# Patient Record
Sex: MALE | Race: WHITE | NOT HISPANIC OR LATINO | URBAN - METROPOLITAN AREA
[De-identification: names, ages, dates, MRNs, and addresses within clinical notes are randomized per-mention and may not be internally consistent; named-entity substitution may affect disease eponyms.]

---

## 2019-02-25 ENCOUNTER — INPATIENT (INPATIENT)
Facility: HOSPITAL | Age: 59
LOS: 7 days | Discharge: HOME | DRG: 871 | End: 2019-03-05
Attending: INTERNAL MEDICINE | Admitting: INTERNAL MEDICINE
Payer: MEDICARE

## 2019-02-25 VITALS
WEIGHT: 231.04 LBS | HEART RATE: 98 BPM | OXYGEN SATURATION: 94 % | DIASTOLIC BLOOD PRESSURE: 62 MMHG | RESPIRATION RATE: 16 BRPM | TEMPERATURE: 98 F | SYSTOLIC BLOOD PRESSURE: 106 MMHG | HEIGHT: 70 IN

## 2019-02-25 DIAGNOSIS — A41.9 SEPSIS, UNSPECIFIED ORGANISM: ICD-10-CM

## 2019-02-25 PROBLEM — Z00.00 ENCOUNTER FOR PREVENTIVE HEALTH EXAMINATION: Status: ACTIVE | Noted: 2019-02-25

## 2019-02-25 LAB
ALBUMIN SERPL ELPH-MCNC: 3.3 G/DL — SIGNIFICANT CHANGE UP (ref 3.3–5)
ALP SERPL-CCNC: 96 U/L — SIGNIFICANT CHANGE UP (ref 40–120)
ALT FLD-CCNC: 27 U/L — SIGNIFICANT CHANGE UP (ref 12–78)
ANION GAP SERPL CALC-SCNC: 9 MMOL/L — SIGNIFICANT CHANGE UP (ref 5–17)
AST SERPL-CCNC: 15 U/L — SIGNIFICANT CHANGE UP (ref 15–37)
BASOPHILS # BLD AUTO: 0 K/UL — SIGNIFICANT CHANGE UP (ref 0–0.2)
BASOPHILS NFR BLD AUTO: 0 % — SIGNIFICANT CHANGE UP (ref 0–2)
BILIRUB SERPL-MCNC: 2.1 MG/DL — HIGH (ref 0.2–1.2)
BUN SERPL-MCNC: 19 MG/DL — SIGNIFICANT CHANGE UP (ref 7–23)
CALCIUM SERPL-MCNC: 8.3 MG/DL — LOW (ref 8.5–10.1)
CHLORIDE SERPL-SCNC: 109 MMOL/L — HIGH (ref 96–108)
CO2 SERPL-SCNC: 24 MMOL/L — SIGNIFICANT CHANGE UP (ref 22–31)
CREAT SERPL-MCNC: 1.6 MG/DL — HIGH (ref 0.5–1.3)
EOSINOPHIL # BLD AUTO: 0 K/UL — SIGNIFICANT CHANGE UP (ref 0–0.5)
EOSINOPHIL NFR BLD AUTO: 0 % — SIGNIFICANT CHANGE UP (ref 0–6)
FLU A RESULT: SIGNIFICANT CHANGE UP
FLU A RESULT: SIGNIFICANT CHANGE UP
FLUAV AG NPH QL: SIGNIFICANT CHANGE UP
FLUBV AG NPH QL: SIGNIFICANT CHANGE UP
GLUCOSE SERPL-MCNC: 122 MG/DL — HIGH (ref 70–99)
HCT VFR BLD CALC: 36.8 % — LOW (ref 39–50)
HGB BLD-MCNC: 12.8 G/DL — LOW (ref 13–17)
LACTATE SERPL-SCNC: 1.1 MMOL/L — SIGNIFICANT CHANGE UP (ref 0.7–2)
LIDOCAIN IGE QN: 45 U/L — LOW (ref 73–393)
LYMPHOCYTES # BLD AUTO: 16 % — SIGNIFICANT CHANGE UP (ref 13–44)
LYMPHOCYTES # BLD AUTO: 2 K/UL — SIGNIFICANT CHANGE UP (ref 1–3.3)
MCHC RBC-ENTMCNC: 30.1 PG — SIGNIFICANT CHANGE UP (ref 27–34)
MCHC RBC-ENTMCNC: 34.8 GM/DL — SIGNIFICANT CHANGE UP (ref 32–36)
MCV RBC AUTO: 86.6 FL — SIGNIFICANT CHANGE UP (ref 80–100)
MONOCYTES # BLD AUTO: 1.88 K/UL — HIGH (ref 0–0.9)
MONOCYTES NFR BLD AUTO: 15 % — HIGH (ref 2–14)
NEUTROPHILS # BLD AUTO: 8.65 K/UL — HIGH (ref 1.8–7.4)
NEUTROPHILS NFR BLD AUTO: 67 % — SIGNIFICANT CHANGE UP (ref 43–77)
NRBC # BLD: SIGNIFICANT CHANGE UP /100 WBCS (ref 0–0)
PLATELET # BLD AUTO: 151 K/UL — SIGNIFICANT CHANGE UP (ref 150–400)
POTASSIUM SERPL-MCNC: 3.4 MMOL/L — LOW (ref 3.5–5.3)
POTASSIUM SERPL-SCNC: 3.4 MMOL/L — LOW (ref 3.5–5.3)
PROCALCITONIN SERPL-MCNC: 0.25 NG/ML — HIGH (ref 0–0.04)
PROT SERPL-MCNC: 7.3 G/DL — SIGNIFICANT CHANGE UP (ref 6–8.3)
RBC # BLD: 4.25 M/UL — SIGNIFICANT CHANGE UP (ref 4.2–5.8)
RBC # FLD: 13.1 % — SIGNIFICANT CHANGE UP (ref 10.3–14.5)
RSV RESULT: SIGNIFICANT CHANGE UP
RSV RNA RESP QL NAA+PROBE: SIGNIFICANT CHANGE UP
SODIUM SERPL-SCNC: 142 MMOL/L — SIGNIFICANT CHANGE UP (ref 135–145)
WBC # BLD: 12.53 K/UL — HIGH (ref 3.8–10.5)
WBC # FLD AUTO: 12.53 K/UL — HIGH (ref 3.8–10.5)

## 2019-02-25 PROCEDURE — 93010 ELECTROCARDIOGRAM REPORT: CPT

## 2019-02-25 PROCEDURE — 99285 EMERGENCY DEPT VISIT HI MDM: CPT

## 2019-02-25 PROCEDURE — 71046 X-RAY EXAM CHEST 2 VIEWS: CPT | Mod: 26

## 2019-02-25 RX ORDER — SODIUM CHLORIDE 9 MG/ML
1000 INJECTION INTRAMUSCULAR; INTRAVENOUS; SUBCUTANEOUS
Qty: 0 | Refills: 0 | Status: DISCONTINUED | OUTPATIENT
Start: 2019-02-25 | End: 2019-02-27

## 2019-02-25 RX ORDER — CLONAZEPAM 1 MG
1 TABLET ORAL THREE TIMES A DAY
Qty: 0 | Refills: 0 | Status: DISCONTINUED | OUTPATIENT
Start: 2019-02-25 | End: 2019-02-27

## 2019-02-25 RX ORDER — POTASSIUM CHLORIDE 20 MEQ
40 PACKET (EA) ORAL ONCE
Qty: 0 | Refills: 0 | Status: COMPLETED | OUTPATIENT
Start: 2019-02-25 | End: 2019-02-25

## 2019-02-25 RX ORDER — SODIUM CHLORIDE 9 MG/ML
3 INJECTION INTRAMUSCULAR; INTRAVENOUS; SUBCUTANEOUS ONCE
Qty: 0 | Refills: 0 | Status: COMPLETED | OUTPATIENT
Start: 2019-02-25 | End: 2019-02-25

## 2019-02-25 RX ORDER — ACETAMINOPHEN 500 MG
650 TABLET ORAL EVERY 6 HOURS
Qty: 0 | Refills: 0 | Status: DISCONTINUED | OUTPATIENT
Start: 2019-02-25 | End: 2019-03-05

## 2019-02-25 RX ORDER — ATORVASTATIN CALCIUM 80 MG/1
20 TABLET, FILM COATED ORAL AT BEDTIME
Qty: 0 | Refills: 0 | Status: DISCONTINUED | OUTPATIENT
Start: 2019-02-25 | End: 2019-03-05

## 2019-02-25 RX ORDER — PIPERACILLIN AND TAZOBACTAM 4; .5 G/20ML; G/20ML
3.38 INJECTION, POWDER, LYOPHILIZED, FOR SOLUTION INTRAVENOUS EVERY 8 HOURS
Qty: 0 | Refills: 0 | Status: DISCONTINUED | OUTPATIENT
Start: 2019-02-26 | End: 2019-03-05

## 2019-02-25 RX ORDER — ENOXAPARIN SODIUM 100 MG/ML
40 INJECTION SUBCUTANEOUS DAILY
Qty: 0 | Refills: 0 | Status: DISCONTINUED | OUTPATIENT
Start: 2019-02-25 | End: 2019-03-05

## 2019-02-25 RX ORDER — SODIUM CHLORIDE 9 MG/ML
1000 INJECTION INTRAMUSCULAR; INTRAVENOUS; SUBCUTANEOUS ONCE
Qty: 0 | Refills: 0 | Status: COMPLETED | OUTPATIENT
Start: 2019-02-25 | End: 2019-02-25

## 2019-02-25 RX ORDER — CLOZAPINE 150 MG/1
100 TABLET, ORALLY DISINTEGRATING ORAL THREE TIMES A DAY
Qty: 0 | Refills: 0 | Status: DISCONTINUED | OUTPATIENT
Start: 2019-02-25 | End: 2019-03-05

## 2019-02-25 RX ORDER — PIPERACILLIN AND TAZOBACTAM 4; .5 G/20ML; G/20ML
3.38 INJECTION, POWDER, LYOPHILIZED, FOR SOLUTION INTRAVENOUS ONCE
Qty: 0 | Refills: 0 | Status: COMPLETED | OUTPATIENT
Start: 2019-02-25 | End: 2019-02-25

## 2019-02-25 RX ADMIN — SODIUM CHLORIDE 1000 MILLILITER(S): 9 INJECTION INTRAMUSCULAR; INTRAVENOUS; SUBCUTANEOUS at 19:15

## 2019-02-25 RX ADMIN — SODIUM CHLORIDE 3 MILLILITER(S): 9 INJECTION INTRAMUSCULAR; INTRAVENOUS; SUBCUTANEOUS at 19:09

## 2019-02-25 RX ADMIN — Medication 40 MILLIEQUIVALENT(S): at 23:41

## 2019-02-25 RX ADMIN — ATORVASTATIN CALCIUM 20 MILLIGRAM(S): 80 TABLET, FILM COATED ORAL at 23:41

## 2019-02-25 RX ADMIN — SODIUM CHLORIDE 1000 MILLILITER(S): 9 INJECTION INTRAMUSCULAR; INTRAVENOUS; SUBCUTANEOUS at 21:15

## 2019-02-25 RX ADMIN — CLOZAPINE 100 MILLIGRAM(S): 150 TABLET, ORALLY DISINTEGRATING ORAL at 23:41

## 2019-02-25 RX ADMIN — PIPERACILLIN AND TAZOBACTAM 200 GRAM(S): 4; .5 INJECTION, POWDER, LYOPHILIZED, FOR SOLUTION INTRAVENOUS at 22:01

## 2019-02-25 NOTE — ED ADULT NURSE NOTE - NSIMPLEMENTINTERV_GEN_ALL_ED
Implemented All Fall Risk Interventions:  [place] to call system. Call bell, personal items and telephone within reach. Instruct patient to call for assistance. Room bathroom lighting operational. Non-slip footwear when patient is off stretcher. Physically safe environment: no spills, clutter or unnecessary equipment. Stretcher in lowest position, wheels locked, appropriate side rails in place. Provide visual cue, wrist band, yellow gown, etc. Monitor gait and stability. Monitor for mental status changes and reorient to person, place, and time. Review medications for side effects contributing to fall risk. Reinforce activity limits and safety measures with patient and family.

## 2019-02-25 NOTE — ED PROVIDER NOTE - CARE PLAN
Principal Discharge DX:	Sepsis  Assessment and plan of treatment:	admit  Secondary Diagnosis:	Pneumonia  Secondary Diagnosis:	Schizophrenia Principal Discharge DX:	Sepsis  Assessment and plan of treatment:	admit  Secondary Diagnosis:	Pneumonia  Secondary Diagnosis:	Schizophrenia  Secondary Diagnosis:	Leukocytosis  Secondary Diagnosis:	Bandemia  Secondary Diagnosis:	Renal insufficiency

## 2019-02-25 NOTE — ED ADULT NURSE NOTE - PMH
Drug abuse, in remission  sober x 20 yrs  H/O ETOH abuse  sober x 20 yrs  H/O pleural effusion  DX IN 2011 when pt had pneumonia  chronic condition now  HLD (hyperlipidemia)    Migraines    Obesity    Pneumonia  2011  Schizophrenia  pt never stated he had schizophrenia during interview but is on medication for it.  Smoking hx

## 2019-02-25 NOTE — ED PROVIDER NOTE - CLINICAL SUMMARY MEDICAL DECISION MAKING FREE TEXT BOX
Pt is a 60 yo male who presents to the ED with a cc of flu-like illness.  PMHx of h/o prior drug abuse currently in remission, h/o ETOH abuse currently in remission, h/o pleural effusion, HLD, migraine, obesity, pneumonia, Schizophrenia.  Pt with reported fevers, chills, cough, and SOB.  Concern for influenza vs pneumonia.  Will obtain screening septic work up, EKG, chest x-ray.  Will begin abx and monitor

## 2019-02-25 NOTE — ED PROVIDER NOTE - OBJECTIVE STATEMENT
Pt is a 58 yo male who presents to the ED with a cc of flu-like illness.  PMHx of h/o prior drug abuse currently in remission, h/o ETOH abuse currently in remission, h/o pleural effusion, HLD, migraine, obesity, pneumonia, Schizophrenia.  Pt reports that for the last several days he has not been feeling well.  He reports fevers T max of 103.5 with associated chills, SOB, and cough productive of yellow sputum.  Pt reports that symptoms have been worsening and so he followed up with his PMD today and was told to come to the ED for concern for possible pneumonia.  Pt denies N/V/D/C, CP, abd pain, ext numbness or weakness.  He reports that he did receive an influenza vaccine this year

## 2019-02-26 DIAGNOSIS — J18.9 PNEUMONIA, UNSPECIFIED ORGANISM: ICD-10-CM

## 2019-02-26 DIAGNOSIS — R10.9 UNSPECIFIED ABDOMINAL PAIN: ICD-10-CM

## 2019-02-26 DIAGNOSIS — Z29.9 ENCOUNTER FOR PROPHYLACTIC MEASURES, UNSPECIFIED: ICD-10-CM

## 2019-02-26 DIAGNOSIS — N28.9 DISORDER OF KIDNEY AND URETER, UNSPECIFIED: ICD-10-CM

## 2019-02-26 DIAGNOSIS — F20.9 SCHIZOPHRENIA, UNSPECIFIED: ICD-10-CM

## 2019-02-26 LAB
ALBUMIN SERPL ELPH-MCNC: 2.9 G/DL — LOW (ref 3.3–5)
ALP SERPL-CCNC: 83 U/L — SIGNIFICANT CHANGE UP (ref 40–120)
ALT FLD-CCNC: 20 U/L — SIGNIFICANT CHANGE UP (ref 12–78)
ANION GAP SERPL CALC-SCNC: 8 MMOL/L — SIGNIFICANT CHANGE UP (ref 5–17)
AST SERPL-CCNC: 17 U/L — SIGNIFICANT CHANGE UP (ref 15–37)
BASOPHILS # BLD AUTO: 0.02 K/UL — SIGNIFICANT CHANGE UP (ref 0–0.2)
BASOPHILS NFR BLD AUTO: 0.2 % — SIGNIFICANT CHANGE UP (ref 0–2)
BILIRUB DIRECT SERPL-MCNC: 0.8 MG/DL — HIGH (ref 0.05–0.2)
BILIRUB INDIRECT FLD-MCNC: 1.4 MG/DL — HIGH (ref 0.2–1)
BILIRUB SERPL-MCNC: 2.2 MG/DL — HIGH (ref 0.2–1.2)
BUN SERPL-MCNC: 17 MG/DL — SIGNIFICANT CHANGE UP (ref 7–23)
CALCIUM SERPL-MCNC: 7.6 MG/DL — LOW (ref 8.5–10.1)
CHLORIDE SERPL-SCNC: 110 MMOL/L — HIGH (ref 96–108)
CO2 SERPL-SCNC: 24 MMOL/L — SIGNIFICANT CHANGE UP (ref 22–31)
CREAT SERPL-MCNC: 1.6 MG/DL — HIGH (ref 0.5–1.3)
EOSINOPHIL # BLD AUTO: 0.01 K/UL — SIGNIFICANT CHANGE UP (ref 0–0.5)
EOSINOPHIL NFR BLD AUTO: 0.1 % — SIGNIFICANT CHANGE UP (ref 0–6)
GLUCOSE SERPL-MCNC: 133 MG/DL — HIGH (ref 70–99)
HCT VFR BLD CALC: 32.8 % — LOW (ref 39–50)
HGB BLD-MCNC: 11.4 G/DL — LOW (ref 13–17)
IMM GRANULOCYTES NFR BLD AUTO: 0.7 % — SIGNIFICANT CHANGE UP (ref 0–1.5)
LYMPHOCYTES # BLD AUTO: 0.88 K/UL — LOW (ref 1–3.3)
LYMPHOCYTES # BLD AUTO: 8.8 % — LOW (ref 13–44)
MAGNESIUM SERPL-MCNC: 1.9 MG/DL — SIGNIFICANT CHANGE UP (ref 1.6–2.6)
MCHC RBC-ENTMCNC: 30 PG — SIGNIFICANT CHANGE UP (ref 27–34)
MCHC RBC-ENTMCNC: 34.8 GM/DL — SIGNIFICANT CHANGE UP (ref 32–36)
MCV RBC AUTO: 86.3 FL — SIGNIFICANT CHANGE UP (ref 80–100)
MONOCYTES # BLD AUTO: 1.31 K/UL — HIGH (ref 0–0.9)
MONOCYTES NFR BLD AUTO: 13.1 % — SIGNIFICANT CHANGE UP (ref 2–14)
NEUTROPHILS # BLD AUTO: 7.72 K/UL — HIGH (ref 1.8–7.4)
NEUTROPHILS NFR BLD AUTO: 77.1 % — HIGH (ref 43–77)
NRBC # BLD: 0 /100 WBCS — SIGNIFICANT CHANGE UP (ref 0–0)
PLATELET # BLD AUTO: 141 K/UL — LOW (ref 150–400)
POTASSIUM SERPL-MCNC: 3.5 MMOL/L — SIGNIFICANT CHANGE UP (ref 3.5–5.3)
POTASSIUM SERPL-SCNC: 3.5 MMOL/L — SIGNIFICANT CHANGE UP (ref 3.5–5.3)
PROT SERPL-MCNC: 6.3 G/DL — SIGNIFICANT CHANGE UP (ref 6–8.3)
RBC # BLD: 3.8 M/UL — LOW (ref 4.2–5.8)
RBC # FLD: 13.2 % — SIGNIFICANT CHANGE UP (ref 10.3–14.5)
SODIUM SERPL-SCNC: 142 MMOL/L — SIGNIFICANT CHANGE UP (ref 135–145)
WBC # BLD: 10.01 K/UL — SIGNIFICANT CHANGE UP (ref 3.8–10.5)
WBC # FLD AUTO: 10.01 K/UL — SIGNIFICANT CHANGE UP (ref 3.8–10.5)

## 2019-02-26 PROCEDURE — 74176 CT ABD & PELVIS W/O CONTRAST: CPT | Mod: 26

## 2019-02-26 PROCEDURE — 71250 CT THORAX DX C-: CPT | Mod: 26

## 2019-02-26 RX ORDER — LANOLIN ALCOHOL/MO/W.PET/CERES
3 CREAM (GRAM) TOPICAL AT BEDTIME
Qty: 0 | Refills: 0 | Status: DISCONTINUED | OUTPATIENT
Start: 2019-02-26 | End: 2019-03-05

## 2019-02-26 RX ORDER — IOHEXOL 300 MG/ML
500 INJECTION, SOLUTION INTRAVENOUS
Qty: 0 | Refills: 0 | Status: COMPLETED | OUTPATIENT
Start: 2019-02-26 | End: 2019-02-26

## 2019-02-26 RX ORDER — DOCUSATE SODIUM 100 MG
100 CAPSULE ORAL THREE TIMES A DAY
Qty: 0 | Refills: 0 | Status: DISCONTINUED | OUTPATIENT
Start: 2019-02-26 | End: 2019-03-05

## 2019-02-26 RX ORDER — POLYETHYLENE GLYCOL 3350 17 G/17G
17 POWDER, FOR SOLUTION ORAL DAILY
Qty: 0 | Refills: 0 | Status: DISCONTINUED | OUTPATIENT
Start: 2019-02-26 | End: 2019-03-05

## 2019-02-26 RX ORDER — SIMETHICONE 80 MG/1
80 TABLET, CHEWABLE ORAL ONCE
Qty: 0 | Refills: 0 | Status: COMPLETED | OUTPATIENT
Start: 2019-02-26 | End: 2019-02-26

## 2019-02-26 RX ORDER — PANTOPRAZOLE SODIUM 20 MG/1
40 TABLET, DELAYED RELEASE ORAL
Qty: 0 | Refills: 0 | Status: DISCONTINUED | OUTPATIENT
Start: 2019-02-26 | End: 2019-03-05

## 2019-02-26 RX ORDER — TRAMADOL HYDROCHLORIDE 50 MG/1
25 TABLET ORAL EVERY 6 HOURS
Qty: 0 | Refills: 0 | Status: DISCONTINUED | OUTPATIENT
Start: 2019-02-26 | End: 2019-03-05

## 2019-02-26 RX ORDER — INFLUENZA VIRUS VACCINE 15; 15; 15; 15 UG/.5ML; UG/.5ML; UG/.5ML; UG/.5ML
0.5 SUSPENSION INTRAMUSCULAR ONCE
Qty: 0 | Refills: 0 | Status: DISCONTINUED | OUTPATIENT
Start: 2019-02-26 | End: 2019-03-05

## 2019-02-26 RX ORDER — KETOROLAC TROMETHAMINE 30 MG/ML
30 SYRINGE (ML) INJECTION ONCE
Qty: 0 | Refills: 0 | Status: DISCONTINUED | OUTPATIENT
Start: 2019-02-26 | End: 2019-02-26

## 2019-02-26 RX ORDER — TRAMADOL HYDROCHLORIDE 50 MG/1
50 TABLET ORAL EVERY 6 HOURS
Qty: 0 | Refills: 0 | Status: DISCONTINUED | OUTPATIENT
Start: 2019-02-26 | End: 2019-03-05

## 2019-02-26 RX ORDER — SENNA PLUS 8.6 MG/1
2 TABLET ORAL AT BEDTIME
Qty: 0 | Refills: 0 | Status: DISCONTINUED | OUTPATIENT
Start: 2019-02-26 | End: 2019-03-05

## 2019-02-26 RX ADMIN — CLOZAPINE 100 MILLIGRAM(S): 150 TABLET, ORALLY DISINTEGRATING ORAL at 13:15

## 2019-02-26 RX ADMIN — Medication 3 MILLIGRAM(S): at 21:19

## 2019-02-26 RX ADMIN — PIPERACILLIN AND TAZOBACTAM 25 GRAM(S): 4; .5 INJECTION, POWDER, LYOPHILIZED, FOR SOLUTION INTRAVENOUS at 21:17

## 2019-02-26 RX ADMIN — Medication 30 MILLILITER(S): at 19:59

## 2019-02-26 RX ADMIN — Medication 30 MILLIGRAM(S): at 19:59

## 2019-02-26 RX ADMIN — ATORVASTATIN CALCIUM 20 MILLIGRAM(S): 80 TABLET, FILM COATED ORAL at 21:19

## 2019-02-26 RX ADMIN — PANTOPRAZOLE SODIUM 40 MILLIGRAM(S): 20 TABLET, DELAYED RELEASE ORAL at 16:58

## 2019-02-26 RX ADMIN — CLOZAPINE 100 MILLIGRAM(S): 150 TABLET, ORALLY DISINTEGRATING ORAL at 06:10

## 2019-02-26 RX ADMIN — SODIUM CHLORIDE 100 MILLILITER(S): 9 INJECTION INTRAMUSCULAR; INTRAVENOUS; SUBCUTANEOUS at 04:55

## 2019-02-26 RX ADMIN — CLOZAPINE 100 MILLIGRAM(S): 150 TABLET, ORALLY DISINTEGRATING ORAL at 21:17

## 2019-02-26 RX ADMIN — Medication 650 MILLIGRAM(S): at 06:08

## 2019-02-26 RX ADMIN — PIPERACILLIN AND TAZOBACTAM 25 GRAM(S): 4; .5 INJECTION, POWDER, LYOPHILIZED, FOR SOLUTION INTRAVENOUS at 06:10

## 2019-02-26 RX ADMIN — ENOXAPARIN SODIUM 40 MILLIGRAM(S): 100 INJECTION SUBCUTANEOUS at 12:02

## 2019-02-26 RX ADMIN — Medication 30 MILLIGRAM(S): at 12:02

## 2019-02-26 RX ADMIN — Medication 1 MILLIGRAM(S): at 06:09

## 2019-02-26 RX ADMIN — Medication 30 MILLIGRAM(S): at 21:21

## 2019-02-26 RX ADMIN — Medication 100 MILLIGRAM(S): at 06:11

## 2019-02-26 RX ADMIN — IOHEXOL 500 MILLILITER(S): 300 INJECTION, SOLUTION INTRAVENOUS at 13:15

## 2019-02-26 RX ADMIN — Medication 650 MILLIGRAM(S): at 16:58

## 2019-02-26 RX ADMIN — SIMETHICONE 80 MILLIGRAM(S): 80 TABLET, CHEWABLE ORAL at 19:59

## 2019-02-26 RX ADMIN — PIPERACILLIN AND TAZOBACTAM 25 GRAM(S): 4; .5 INJECTION, POWDER, LYOPHILIZED, FOR SOLUTION INTRAVENOUS at 13:15

## 2019-02-26 RX ADMIN — IOHEXOL 500 MILLILITER(S): 300 INJECTION, SOLUTION INTRAVENOUS at 12:28

## 2019-02-26 RX ADMIN — POLYETHYLENE GLYCOL 3350 17 GRAM(S): 17 POWDER, FOR SOLUTION ORAL at 21:19

## 2019-02-26 NOTE — H&P ADULT - HISTORY OF PRESENT ILLNESS
Pt is a 60 yo male who presents to the ED with a cc of flu-like illness.  PMHx of h/o prior drug abuse currently in remission, h/o ETOH abuse currently in remission, h/o pleural effusion, HLD, migraine, obesity, pneumonia, Schizophrenia.  Pt reports that for the last several days he has not been feeling well.  He reports fevers T max of 103.5 with associated chills, SOB, and cough productive of yellow sputum.  Pt reports that symptoms have been worsening and so he followed up with his PMD today and was told to come to the ED for concern for possible pneumonia.  Pt denies N/V/D/C, CP, ext numbness or weakness.  He reports that he did receive an influenza vaccine this year. Also c/o abdominal pain.

## 2019-02-26 NOTE — CONSULT NOTE ADULT - SUBJECTIVE AND OBJECTIVE BOX
Date/Time Patient Seen:  		  Referring MD:   Data Reviewed	       Patient is a 59y old  Male who presents with a chief complaint of     Subjective/HPI    in bed  seen and examined  poor historian  H and P reviewed  er provider note reviewed  ex smoker  cxr reviewed - PNA  labs reviewed    on ABX  on room air    · Chief Complaint: The patient is a 59y Male complaining of flu-like symptoms.  · HPI Objective Statement: Pt is a 60 yo male who presents to the ED with a cc of flu-like illness.  PMHx of h/o prior drug abuse currently in remission, h/o ETOH abuse currently in remission, h/o pleural effusion, HLD, migraine, obesity, pneumonia, Schizophrenia.  Pt reports that for the last several days he has not been feeling well.  He reports fevers T max of 103.5 with associated chills, SOB, and cough productive of yellow sputum.  Pt reports that symptoms have been worsening and so he followed up with his PMD today and was told to come to the ED for concern for possible pneumonia.  Pt denies N/V/D/C, CP, abd pain, ext numbness or weakness.  He reports that he did receive an influenza vaccine this year  · Associated Symptoms: see above  · Significant Negative Findings: see above  · Location: see above  · Radiation: see above  · Quality: see above  · Aggravating Factors: see above  · Relieving Factors: see above    HIV:    HIV Status:  · Offered: Declined        PAST MEDICAL & SURGICAL HISTORY:  H/O pleural effusion: DX IN 2011 when pt had pneumonia  chronic condition now  CIRILO (obstructive sleep apnea)  Obesity  Migraines  Pneumonia: 2011  Smoking hx  H/O ETOH abuse: sober x 20 yrs  Drug abuse, in remission: sober x 20 yrs  HLD (hyperlipidemia)  Schizophrenia: pt never stated he had schizophrenia during interview but is on medication for it.  Skin lesion: face   &quot;pre-cancerous&quot;  Cyst: scalp excised 20 yrs ago        Medication list         MEDICATIONS  (STANDING):  atorvastatin 20 milliGRAM(s) Oral at bedtime  cloZAPine 100 milliGRAM(s) Oral three times a day  enoxaparin Injectable 40 milliGRAM(s) SubCutaneous daily  PARoxetine 30 milliGRAM(s) Oral daily  piperacillin/tazobactam IVPB. 3.375 Gram(s) IV Intermittent every 8 hours  sodium chloride 0.9%. 1000 milliLiter(s) (100 mL/Hr) IV Continuous <Continuous>    MEDICATIONS  (PRN):  acetaminophen   Tablet .. 650 milliGRAM(s) Oral every 6 hours PRN Temp greater or equal to 38C (100.4F), Mild Pain (1 - 3)  clonazePAM Tablet 1 milliGRAM(s) Oral three times a day PRN anxiety/agitation  guaiFENesin    Syrup 100 milliGRAM(s) Oral every 6 hours PRN Cough         Vitals log        ICU Vital Signs Last 24 Hrs  T(C): 36.6 (26 Feb 2019 08:53), Max: 38.3 (26 Feb 2019 06:14)  T(F): 97.9 (26 Feb 2019 08:53), Max: 100.9 (26 Feb 2019 06:14)  HR: 81 (26 Feb 2019 08:53) (81 - 98)  BP: 107/69 (26 Feb 2019 08:53) (100/64 - 127/77)  BP(mean): --  ABP: --  ABP(mean): --  RR: 16 (26 Feb 2019 08:53) (14 - 16)  SpO2: 90% (26 Feb 2019 08:53) (90% - 94%)           Input and Output:  I&O's Detail      Lab Data                        11.4   10.01 )-----------( 141      ( 26 Feb 2019 07:02 )             32.8     02-26    142  |  110<H>  |  17  ----------------------------<  133<H>  3.5   |  24  |  1.60<H>    Ca    7.6<L>      26 Feb 2019 07:02  Mg     1.9     02-26    TPro  6.3  /  Alb  2.9<L>  /  TBili  2.2<H>  /  DBili  .80<H>  /  AST  17  /  ALT  20  /  AlkPhos  83  02-26            Review of Systems	      Objective     Physical Examination    heart s1s2  lung dec BS  abd soft  obese  cn grossly int  verbal      Pertinent Lab findings & Imaging      Navarrete:  NO   Adequate UO     I&O's Detail           Discussed with:     Cultures:	        Radiology          EXAM:  XR CHEST PA LAT 2V                            PROCEDURE DATE:  02/25/2019          INTERPRETATION:  Clinical information: Cough and congestion.    Technique: PA and lateral views of the chest.    Comparison: Prior chest x-ray examination from 8/13/2012.    Findings: There is consolidation within the right upper lobe at the   lateral lower aspect. The left lung remains clear. The heart size is   normal. The visualized osseous structures are unremarkable.    IMPRESSION: Right upper lobe pneumonia.                SUSIE DALLAS M.D., ATTENDING RADIOLOGIST  This document has been electronically signed. Feb 25 2019  7:59PM

## 2019-02-26 NOTE — CONSULT NOTE ADULT - SUBJECTIVE AND OBJECTIVE BOX
Chief Complaint:  Patient is a 59y old  Male who presents with a chief complaint of cough Pt is a 58 yo male who presents to the ED with a cc of flu-like illness.  PMHx of h/o prior drug abuse currently in remission, h/o ETOH abuse currently in remission, h/o pleural effusion, HLD, migraine, obesity, pneumonia, Schizophrenia.  Pt reports that for the last several days he has not been feeling well.  He reports fevers T max of 103.5 with associated chills, SOB, and cough productive of yellow sputum.  Pt reports that symptoms have been worsening and so he followed up with his PMD today and was told to come to the ED for concern for possible pneumonia.  Pt denies N/V/D/C, CP, ext numbness or weakness.  He reports that he did receive an influenza vaccine this year. Also c/o abdominal pain.       Review of Systems:  · General Symptoms	fever; chills; malaise; fatigue	  · Skin/Breast	negative	  · Ophthalmologic	negative	  · ENMT	negative	  · Respiratory and Thorax Symptoms	dyspnea  cough	  · Cardiovascular	negative	  · Gastrointestinal Symptoms	abdominal pain	  · Genitourinary	negative	  · Musculoskeletal	negative	  · Neurological	negative	  · Psychiatric	negative	  · Hematology/Lymphatics	negative	  · Endocrine	negative	  · Allergic/Immunologic	negative	  now here for follow up had a colonoscopy recently  no jim no brbpr    Allergies:  No Known Allergies      Medications:  acetaminophen   Tablet .. 650 milliGRAM(s) Oral every 6 hours PRN  atorvastatin 20 milliGRAM(s) Oral at bedtime  clonazePAM Tablet 1 milliGRAM(s) Oral three times a day PRN  cloZAPine 100 milliGRAM(s) Oral three times a day  enoxaparin Injectable 40 milliGRAM(s) SubCutaneous daily  guaiFENesin    Syrup 100 milliGRAM(s) Oral every 6 hours PRN  influenza   Vaccine 0.5 milliLiter(s) IntraMuscular once  PARoxetine 30 milliGRAM(s) Oral daily  piperacillin/tazobactam IVPB. 3.375 Gram(s) IV Intermittent every 8 hours  sodium chloride 0.9%. 1000 milliLiter(s) IV Continuous <Continuous>      PMHX/PSHX:  H/O pleural effusion  CIRILO (obstructive sleep apnea)  Obesity  Migraines  Pneumonia  Smoking hx  H/O ETOH abuse  Drug abuse, in remission  HLD (hyperlipidemia)  Schizophrenia  Skin lesion  Cyst      Family history:  No pertinent family history in first degree relatives      Social History: no etoh no cigs no ivda    ROS:     General:  No wt loss, fevers, chills, night sweats, fatigue,   Eyes:  Good vision, no reported pain  ENT:  No sore throat, pain, runny nose, dysphagia  CV:  No pain, palpitations, hypo/hypertension  Resp:  No dyspnea, cough, tachypnea, wheezing  GI:  No pain, No nausea, No vomiting, No diarrhea, No constipation, No weight loss, No fever, No pruritis, No rectal bleeding, No tarry stools, No dysphagia,  :  No pain, bleeding, incontinence, nocturia  Muscle:  No pain, weakness  Neuro:  No weakness, tingling, memory problems  Psych:  No fatigue, insomnia, mood problems, depression  Endocrine:  No polyuria, polydipsia, cold/heat intolerance  Heme:  No petechiae, ecchymosis, easy bruisability  Skin:  No rash, tattoos, scars, edema      PHYSICAL EXAM:   Vital Signs:  Vital Signs Last 24 Hrs  T(C): 36.7 (26 Feb 2019 13:26), Max: 38.3 (26 Feb 2019 06:14)  T(F): 98 (26 Feb 2019 13:26), Max: 100.9 (26 Feb 2019 06:14)  HR: 82 (26 Feb 2019 13:26) (81 - 98)  BP: 117/78 (26 Feb 2019 13:26) (100/64 - 127/77)  BP(mean): --  RR: 16 (26 Feb 2019 13:26) (14 - 16)  SpO2: 94% (26 Feb 2019 13:26) (90% - 94%)  Daily Height in cm: 177.8 (25 Feb 2019 18:44)    Daily     GENERAL:  Appears stated age, well-groomed, well-nourished, no distress  HEENT:  NC/AT,  conjunctivae clear and pink, no thyromegaly, nodules, adenopathy, no JVD, sclera -anicteric  CHEST:  Full & symmetric excursion, no increased effort, breath sounds clear  HEART:  Regular rhythm, S1, S2, no murmur/rub/S3/S4, no abdominal bruit, no edema  ABDOMEN:  Soft, non-tender, non-distended, normoactive bowel sounds,  no masses ,no hepato-splenomegaly, no signs of chronic liver disease  EXTEREMITIES:  no cyanosis,clubbing or edema  SKIN:  No rash/erythema/ecchymoses/petechiae/wounds/abscess/warm/dry  NEURO:  Alert, oriented, no asterixis, no tremor, no encephalopathy    LABS:                        11.4   10.01 )-----------( 141      ( 26 Feb 2019 07:02 )             32.8     02-26    142  |  110<H>  |  17  ----------------------------<  133<H>  3.5   |  24  |  1.60<H>    Ca    7.6<L>      26 Feb 2019 07:02  Mg     1.9     02-26    TPro  6.3  /  Alb  2.9<L>  /  TBili  2.2<H>  /  DBili  .80<H>  /  AST  17  /  ALT  20  /  AlkPhos  83  02-26    LIVER FUNCTIONS - ( 26 Feb 2019 07:02 )  Alb: 2.9 g/dL / Pro: 6.3 g/dL / ALK PHOS: 83 U/L / ALT: 20 U/L / AST: 17 U/L / GGT: x               Amylase Serum--      Lipase serum45       Ammonia--      Imaging:

## 2019-02-26 NOTE — CONSULT NOTE ADULT - PROBLEM SELECTOR RECOMMENDATION 9
eval PNA  will check CT chest  cont emp ABX  monitor vs and HD and Sat, keep sat > 88 pct, tylenol PRN for fever and or pain  out of bed   CIRILO - refuses CPAP  will check U tox this am  check TSH   weight management and sleep hygiene discussion  dietary discretion  cx pending  robitussin PRN for cough  discussed with pt at the bedside  will follow

## 2019-02-26 NOTE — H&P ADULT - PROBLEM SELECTOR PLAN 1
Admit  iv zosyn  pan-culture  pulmonary consult  nebulizers  oxygen prn  procalcitonin level  CT chest  Further work-up/management pending clinical course.

## 2019-02-26 NOTE — CONSULT NOTE ADULT - PROBLEM SELECTOR RECOMMENDATION 9
start bowel regimen  add lactulose 10cc q 6 hours and glycerin suppository  in and out  gas pill with simethicone and maalox q 6 hours    gerd precautions  in and out  ppi once a day  may need  upper gastrointestinal endoscopy in am  advance diet

## 2019-02-27 LAB
ANION GAP SERPL CALC-SCNC: 9 MMOL/L — SIGNIFICANT CHANGE UP (ref 5–17)
BASE EXCESS BLDV CALC-SCNC: -2.6 MMOL/L — LOW (ref -2–2)
BASOPHILS # BLD AUTO: 0.05 K/UL — SIGNIFICANT CHANGE UP (ref 0–0.2)
BASOPHILS NFR BLD AUTO: 1 % — SIGNIFICANT CHANGE UP (ref 0–2)
BLOOD GAS COMMENTS, VENOUS: SIGNIFICANT CHANGE UP
BUN SERPL-MCNC: 19 MG/DL — SIGNIFICANT CHANGE UP (ref 7–23)
CALCIUM SERPL-MCNC: 7.3 MG/DL — LOW (ref 8.5–10.1)
CHLORIDE SERPL-SCNC: 112 MMOL/L — HIGH (ref 96–108)
CO2 SERPL-SCNC: 22 MMOL/L — SIGNIFICANT CHANGE UP (ref 22–31)
CREAT SERPL-MCNC: 1.6 MG/DL — HIGH (ref 0.5–1.3)
EOSINOPHIL # BLD AUTO: 0 K/UL — SIGNIFICANT CHANGE UP (ref 0–0.5)
EOSINOPHIL NFR BLD AUTO: 0 % — SIGNIFICANT CHANGE UP (ref 0–6)
GLUCOSE SERPL-MCNC: 104 MG/DL — HIGH (ref 70–99)
HCO3 BLDV-SCNC: 22 MMOL/L — SIGNIFICANT CHANGE UP (ref 21–29)
HCT VFR BLD CALC: 30.7 % — LOW (ref 39–50)
HCV AB S/CO SERPL IA: 0.1 S/CO — SIGNIFICANT CHANGE UP (ref 0–0.79)
HCV AB SERPL-IMP: SIGNIFICANT CHANGE UP
HGB BLD-MCNC: 10.5 G/DL — LOW (ref 13–17)
HOROWITZ INDEX BLDV+IHG-RTO: 21 — SIGNIFICANT CHANGE UP
LACTATE SERPL-SCNC: 0.9 MMOL/L — SIGNIFICANT CHANGE UP (ref 0.7–2)
LYMPHOCYTES # BLD AUTO: 0.44 K/UL — LOW (ref 1–3.3)
LYMPHOCYTES # BLD AUTO: 8 % — LOW (ref 13–44)
MAGNESIUM SERPL-MCNC: 2.2 MG/DL — SIGNIFICANT CHANGE UP (ref 1.6–2.6)
MANUAL SMEAR VERIFICATION: SIGNIFICANT CHANGE UP
MCHC RBC-ENTMCNC: 29.9 PG — SIGNIFICANT CHANGE UP (ref 27–34)
MCHC RBC-ENTMCNC: 34.2 GM/DL — SIGNIFICANT CHANGE UP (ref 32–36)
MCV RBC AUTO: 87.5 FL — SIGNIFICANT CHANGE UP (ref 80–100)
MONOCYTES # BLD AUTO: 0.49 K/UL — SIGNIFICANT CHANGE UP (ref 0–0.9)
MONOCYTES NFR BLD AUTO: 9 % — SIGNIFICANT CHANGE UP (ref 2–14)
NEUTROPHILS # BLD AUTO: 4.49 K/UL — SIGNIFICANT CHANGE UP (ref 1.8–7.4)
NEUTROPHILS NFR BLD AUTO: 74 % — SIGNIFICANT CHANGE UP (ref 43–77)
NEUTS BAND # BLD: 8 % — SIGNIFICANT CHANGE UP (ref 0–8)
NRBC # BLD: 0 — SIGNIFICANT CHANGE UP
NRBC # BLD: SIGNIFICANT CHANGE UP /100 WBCS (ref 0–0)
NT-PROBNP SERPL-SCNC: 1375 PG/ML — HIGH (ref 0–125)
PCO2 BLDV: 35 MMHG — SIGNIFICANT CHANGE UP (ref 35–50)
PH BLDV: 7.41 — SIGNIFICANT CHANGE UP (ref 7.35–7.45)
PLAT MORPH BLD: NORMAL — SIGNIFICANT CHANGE UP
PLATELET # BLD AUTO: 156 K/UL — SIGNIFICANT CHANGE UP (ref 150–400)
PO2 BLDV: 130 MMHG — HIGH (ref 25–45)
POTASSIUM SERPL-MCNC: 3.2 MMOL/L — LOW (ref 3.5–5.3)
POTASSIUM SERPL-SCNC: 3.2 MMOL/L — LOW (ref 3.5–5.3)
RBC # BLD: 3.51 M/UL — LOW (ref 4.2–5.8)
RBC # FLD: 13.2 % — SIGNIFICANT CHANGE UP (ref 10.3–14.5)
RBC BLD AUTO: NORMAL — SIGNIFICANT CHANGE UP
SAO2 % BLDV: 99 % — HIGH (ref 67–88)
SODIUM SERPL-SCNC: 143 MMOL/L — SIGNIFICANT CHANGE UP (ref 135–145)
WBC # BLD: 5.48 K/UL — SIGNIFICANT CHANGE UP (ref 3.8–10.5)
WBC # FLD AUTO: 5.48 K/UL — SIGNIFICANT CHANGE UP (ref 3.8–10.5)

## 2019-02-27 PROCEDURE — 71045 X-RAY EXAM CHEST 1 VIEW: CPT | Mod: 26

## 2019-02-27 RX ORDER — ONDANSETRON 8 MG/1
4 TABLET, FILM COATED ORAL ONCE
Qty: 0 | Refills: 0 | Status: COMPLETED | OUTPATIENT
Start: 2019-02-27 | End: 2019-02-27

## 2019-02-27 RX ORDER — GLYCERIN ADULT
1 SUPPOSITORY, RECTAL RECTAL AT BEDTIME
Qty: 0 | Refills: 0 | Status: DISCONTINUED | OUTPATIENT
Start: 2019-02-27 | End: 2019-03-05

## 2019-02-27 RX ORDER — POTASSIUM CHLORIDE 20 MEQ
40 PACKET (EA) ORAL ONCE
Qty: 0 | Refills: 0 | Status: COMPLETED | OUTPATIENT
Start: 2019-02-27 | End: 2019-02-27

## 2019-02-27 RX ORDER — IPRATROPIUM/ALBUTEROL SULFATE 18-103MCG
3 AEROSOL WITH ADAPTER (GRAM) INHALATION EVERY 6 HOURS
Qty: 0 | Refills: 0 | Status: DISCONTINUED | OUTPATIENT
Start: 2019-02-27 | End: 2019-03-05

## 2019-02-27 RX ADMIN — Medication 30 MILLILITER(S): at 05:50

## 2019-02-27 RX ADMIN — Medication 600 MILLIGRAM(S): at 18:04

## 2019-02-27 RX ADMIN — CLOZAPINE 100 MILLIGRAM(S): 150 TABLET, ORALLY DISINTEGRATING ORAL at 05:50

## 2019-02-27 RX ADMIN — Medication 100 MILLIGRAM(S): at 13:29

## 2019-02-27 RX ADMIN — Medication 30 MILLIGRAM(S): at 11:15

## 2019-02-27 RX ADMIN — Medication 30 MILLILITER(S): at 11:15

## 2019-02-27 RX ADMIN — Medication 100 MILLIGRAM(S): at 21:50

## 2019-02-27 RX ADMIN — Medication 100 MILLIGRAM(S): at 13:01

## 2019-02-27 RX ADMIN — Medication 650 MILLIGRAM(S): at 05:49

## 2019-02-27 RX ADMIN — Medication 100 MILLIGRAM(S): at 05:50

## 2019-02-27 RX ADMIN — PIPERACILLIN AND TAZOBACTAM 25 GRAM(S): 4; .5 INJECTION, POWDER, LYOPHILIZED, FOR SOLUTION INTRAVENOUS at 05:50

## 2019-02-27 RX ADMIN — Medication 3 MILLILITER(S): at 20:00

## 2019-02-27 RX ADMIN — Medication 3 MILLILITER(S): at 16:01

## 2019-02-27 RX ADMIN — ENOXAPARIN SODIUM 40 MILLIGRAM(S): 100 INJECTION SUBCUTANEOUS at 11:14

## 2019-02-27 RX ADMIN — ATORVASTATIN CALCIUM 20 MILLIGRAM(S): 80 TABLET, FILM COATED ORAL at 21:49

## 2019-02-27 RX ADMIN — PANTOPRAZOLE SODIUM 40 MILLIGRAM(S): 20 TABLET, DELAYED RELEASE ORAL at 05:51

## 2019-02-27 RX ADMIN — CLOZAPINE 100 MILLIGRAM(S): 150 TABLET, ORALLY DISINTEGRATING ORAL at 13:28

## 2019-02-27 RX ADMIN — ONDANSETRON 4 MILLIGRAM(S): 8 TABLET, FILM COATED ORAL at 04:18

## 2019-02-27 RX ADMIN — Medication 650 MILLIGRAM(S): at 06:42

## 2019-02-27 RX ADMIN — Medication 40 MILLIEQUIVALENT(S): at 18:06

## 2019-02-27 RX ADMIN — POLYETHYLENE GLYCOL 3350 17 GRAM(S): 17 POWDER, FOR SOLUTION ORAL at 11:15

## 2019-02-27 RX ADMIN — SENNA PLUS 2 TABLET(S): 8.6 TABLET ORAL at 21:49

## 2019-02-27 RX ADMIN — Medication 1 MILLIGRAM(S): at 12:58

## 2019-02-27 RX ADMIN — Medication 100 MILLIGRAM(S): at 12:17

## 2019-02-27 RX ADMIN — PIPERACILLIN AND TAZOBACTAM 25 GRAM(S): 4; .5 INJECTION, POWDER, LYOPHILIZED, FOR SOLUTION INTRAVENOUS at 13:29

## 2019-02-27 RX ADMIN — PIPERACILLIN AND TAZOBACTAM 25 GRAM(S): 4; .5 INJECTION, POWDER, LYOPHILIZED, FOR SOLUTION INTRAVENOUS at 21:50

## 2019-02-27 NOTE — PROGRESS NOTE ADULT - SUBJECTIVE AND OBJECTIVE BOX
INTERVAL HPI/OVERNIGHT EVENTS:  pt seen and examined  denies n/v  c/o lq abd pain  reports bm yesterday  afebrile overnight labs noted    MEDICATIONS  (STANDING):  aluminum hydroxide/magnesium hydroxide/simethicone Suspension 30 milliLiter(s) Oral every 6 hours  atorvastatin 20 milliGRAM(s) Oral at bedtime  cloZAPine 100 milliGRAM(s) Oral three times a day  docusate sodium 100 milliGRAM(s) Oral three times a day  enoxaparin Injectable 40 milliGRAM(s) SubCutaneous daily  guaiFENesin  milliGRAM(s) Oral every 12 hours  influenza   Vaccine 0.5 milliLiter(s) IntraMuscular once  pantoprazole    Tablet 40 milliGRAM(s) Oral before breakfast  PARoxetine 30 milliGRAM(s) Oral daily  piperacillin/tazobactam IVPB. 3.375 Gram(s) IV Intermittent every 8 hours  polyethylene glycol 3350 17 Gram(s) Oral daily  senna 2 Tablet(s) Oral at bedtime  sodium chloride 0.9%. 1000 milliLiter(s) (100 mL/Hr) IV Continuous <Continuous>    MEDICATIONS  (PRN):  acetaminophen   Tablet .. 650 milliGRAM(s) Oral every 6 hours PRN Temp greater or equal to 38C (100.4F), Mild Pain (1 - 3)  benzonatate 100 milliGRAM(s) Oral three times a day PRN Cough  clonazePAM Tablet 1 milliGRAM(s) Oral three times a day PRN anxiety/agitation  guaiFENesin    Syrup 100 milliGRAM(s) Oral every 6 hours PRN Cough  melatonin 3 milliGRAM(s) Oral at bedtime PRN Insomnia  traMADol 25 milliGRAM(s) Oral every 6 hours PRN Moderate Pain (4 - 6)  traMADol 50 milliGRAM(s) Oral every 6 hours PRN Severe Pain (7 - 10)      Allergies    No Known Allergies    Intolerances        Review of Systems:    General:  No wt loss, fevers, chills, night sweats, fatigue   Eyes:  Good vision, no reported pain  ENT:  No sore throat, pain, runny nose, dysphagia  CV:  No pain, palpitations, hypo/hypertension  Resp:  No dyspnea, cough, tachypnea, wheezing  GI:  +pain, No nausea, No vomiting, No diarrhea, No constipation, No weight loss, No fever, No pruritis, No rectal bleeding, No melena, No dysphagia  :  No pain, bleeding, incontinence, nocturia  Muscle:  No pain, weakness  Neuro:  No weakness, tingling, memory problems  Psych:  No fatigue, insomnia, mood problems, depression  Endocrine:  No polyuria, polydypsia, cold/heat intolerance  Heme:  No petechiae, ecchymosis, easy bruisability  Skin:  No rash, tattoos, scars, edema      Vital Signs Last 24 Hrs  T(C): 36.7 (27 Feb 2019 05:34), Max: 37.5 (26 Feb 2019 20:42)  T(F): 98.1 (27 Feb 2019 05:34), Max: 99.5 (26 Feb 2019 20:42)  HR: 95 (27 Feb 2019 05:34) (82 - 95)  BP: 138/72 (27 Feb 2019 05:34) (97/64 - 138/72)  BP(mean): --  RR: 18 (27 Feb 2019 05:34) (16 - 18)  SpO2: 90% (27 Feb 2019 05:34) (90% - 95%)    PHYSICAL EXAM:    Constitutional: NAD  HEENT: ncat poor dentition  Neck: No LAD  Respiratory: dec bs  Cardiovascular: S1 and S2, RRR  Gastrointestinal: obese soft nt no guarding mild dt  Extremities: No peripheral edema  Vascular: 2+ peripheral pulses  Neurological: Awake alert   Skin: No rashes      LABS:                        10.5   5.48  )-----------( 156      ( 27 Feb 2019 08:19 )             30.7     02-27    143  |  112<H>  |  19  ----------------------------<  104<H>  3.2<L>   |  22  |  1.60<H>    Ca    7.3<L>      27 Feb 2019 08:19  Mg     2.2     02-27    TPro  6.3  /  Alb  2.9<L>  /  TBili  2.2<H>  /  DBili  .80<H>  /  AST  17  /  ALT  20  /  AlkPhos  83  02-26          RADIOLOGY & ADDITIONAL TESTS:

## 2019-02-27 NOTE — CONSULT NOTE ADULT - SUBJECTIVE AND OBJECTIVE BOX
[facility], Division of Infectious Diseases  CASANDRA Dyson A. Lee    DM, JAME  59y, Male  [id number]    HPI--  59M, only a fair historian, hx schizophrenia and prior polysubstance abuse, states has had a week of feeling unwell characterized by nonproductive cough, fever, chills, shortness of breath. Denies sick contacts. Denies travel.     PMH/PSH--  H/O pleural effusion  CIRILO (obstructive sleep apnea)  Obesity  Migraines  Pneumonia  Smoking hx  H/O ETOH abuse  Drug abuse, in remission  HLD (hyperlipidemia)  Schizophrenia  Skin lesion  Cyst      Allergies-- denies.       Medications--  Antibiotics: piperacillin/tazobactam IVPB. 3.375 Gram(s) IV Intermittent every 8 hours    Immunologic: influenza   Vaccine 0.5 milliLiter(s) IntraMuscular once    Other: acetaminophen   Tablet .. PRN  ALBUTerol/ipratropium for Nebulization  aluminum hydroxide/magnesium hydroxide/simethicone Suspension  atorvastatin  benzonatate  clonazePAM Tablet PRN  cloZAPine  docusate sodium  enoxaparin Injectable  glycerin Suppository - Adult  guaiFENesin    Syrup PRN  guaiFENesin ER  HYDROcodone/homatropine Syrup PRN  melatonin PRN  pantoprazole    Tablet  PARoxetine  polyethylene glycol 3350  senna  traMADol PRN  traMADol PRN      Social History--  EtOH: prior  Tobacco: prior   Drug Use: prior    Family/Marital History--  No pertinent family history in first degree relatives    Remainder not relevant to clinical concern.      Review of Systems:  Not clear that he is reliable but otherwise negative.    Physical Exam--  Vital Signs: T(F): 98.2 (02-27-19 @ 13:08), Max: 99.5 (02-26-19 @ 20:42)  HR: 92 (02-27-19 @ 16:03)  BP: 124/74 (02-27-19 @ 13:08)  RR: 16 (02-27-19 @ 13:08)  SpO2: 93% (02-27-19 @ 16:03)  Wt(kg): --  General: Nontoxic-appearing Male in no acute distress.  HEENT: AT/NC. PERRL. EOMI. Anicteric. Conjunctiva pink and moist. Oropharynx clear. Dentition poor.  Neck: Not rigid. No sense of mass.  Nodes: None palpable.  Lungs: Absent BS RUL/RML areas. L clear.  Heart: Regular rate and rhythm. No Murmur. No rub. No gallop. No palpable thrill.  Abdomen: Bowel sounds present and normoactive. Soft. Nondistended. Nontender. No sense of mass. No organomegaly. Obese.   Back: No spinal tenderness. No costovertebral angle tenderness.   Extremities: No cyanosis or clubbing. No edema.   Skin: Warm. Dry. Good turgor. No rash. No vasculitic stigmata.  Psychiatric: Odd affect, appropriate mood        Laboratory & Imaging Data--  CBC                        10.5   5.48  )-----------( 156      ( 27 Feb 2019 08:19 )             30.7     WBC Count: 10.01 K/uL (02-26-19 @ 07:02)  WBC Count: 12.53 K/uL (02-25-19 @ 19:38)      Chemistries  02-27    143  |  112<H>  |  19  ----------------------------<  104<H>  3.2<L>   |  22  |  1.60<H>    Ca    7.3<L>      27 Feb 2019 08:19  Mg     2.2     02-27    TPro  6.3  /  Alb  2.9<L>  /  TBili  2.2<H>  /  DBili  .80<H>  /  AST  17  /  ALT  20  /  AlkPhos  83  02-26      Culture Data    Culture - Blood (collected 26 Feb 2019 02:52)  Source: .Blood Blood-Venous  Preliminary Report (27 Feb 2019 03:01):    No growth to date.    Culture - Blood (collected 26 Feb 2019 02:52)  Source: .Blood Blood-Venous  Preliminary Report (27 Feb 2019 03:01):    No growth to date.    < from: CT Chest No Cont (02.26.19 @ 15:01) >  mpression:    Limited by lack of IV contrast.  Extensive consolidative pneumonia right upper lobe. Please follow to   resolution to exclude underlying occult lesion. Trace right pleural   effusion.  Moderate splenomegaly.  Nonspecific perirenal stranding. Correlate for potential UTI  < end of copied text >

## 2019-02-27 NOTE — PROGRESS NOTE ADULT - SUBJECTIVE AND OBJECTIVE BOX
Date/Time Patient Seen:  		  Referring MD:   Data Reviewed	       Patient is a 59y old  Male who presents with a chief complaint of cough (26 Feb 2019 14:30)      Subjective/HPI     PAST MEDICAL & SURGICAL HISTORY:  H/O pleural effusion: DX IN 2011 when pt had pneumonia  chronic condition now  CIRILO (obstructive sleep apnea)  Obesity  Migraines  Pneumonia: 2011  Smoking hx  H/O ETOH abuse: sober x 20 yrs  Drug abuse, in remission: sober x 20 yrs  HLD (hyperlipidemia)  Schizophrenia: pt never stated he had schizophrenia during interview but is on medication for it.  Skin lesion: face   &quot;pre-cancerous&quot;  Cyst: scalp excised 20 yrs ago        Medication list         MEDICATIONS  (STANDING):  aluminum hydroxide/magnesium hydroxide/simethicone Suspension 30 milliLiter(s) Oral every 6 hours  atorvastatin 20 milliGRAM(s) Oral at bedtime  cloZAPine 100 milliGRAM(s) Oral three times a day  docusate sodium 100 milliGRAM(s) Oral three times a day  enoxaparin Injectable 40 milliGRAM(s) SubCutaneous daily  guaiFENesin  milliGRAM(s) Oral every 12 hours  influenza   Vaccine 0.5 milliLiter(s) IntraMuscular once  pantoprazole    Tablet 40 milliGRAM(s) Oral before breakfast  PARoxetine 30 milliGRAM(s) Oral daily  piperacillin/tazobactam IVPB. 3.375 Gram(s) IV Intermittent every 8 hours  polyethylene glycol 3350 17 Gram(s) Oral daily  senna 2 Tablet(s) Oral at bedtime  sodium chloride 0.9%. 1000 milliLiter(s) (100 mL/Hr) IV Continuous <Continuous>    MEDICATIONS  (PRN):  acetaminophen   Tablet .. 650 milliGRAM(s) Oral every 6 hours PRN Temp greater or equal to 38C (100.4F), Mild Pain (1 - 3)  benzonatate 100 milliGRAM(s) Oral three times a day PRN Cough  clonazePAM Tablet 1 milliGRAM(s) Oral three times a day PRN anxiety/agitation  guaiFENesin    Syrup 100 milliGRAM(s) Oral every 6 hours PRN Cough  melatonin 3 milliGRAM(s) Oral at bedtime PRN Insomnia  traMADol 25 milliGRAM(s) Oral every 6 hours PRN Moderate Pain (4 - 6)  traMADol 50 milliGRAM(s) Oral every 6 hours PRN Severe Pain (7 - 10)         Vitals log        ICU Vital Signs Last 24 Hrs  T(C): 36.7 (27 Feb 2019 05:34), Max: 37.5 (26 Feb 2019 20:42)  T(F): 98.1 (27 Feb 2019 05:34), Max: 99.5 (26 Feb 2019 20:42)  HR: 95 (27 Feb 2019 05:34) (81 - 95)  BP: 138/72 (27 Feb 2019 05:34) (97/64 - 138/72)  BP(mean): --  ABP: --  ABP(mean): --  RR: 18 (27 Feb 2019 05:34) (16 - 18)  SpO2: 90% (27 Feb 2019 05:34) (90% - 95%)           Input and Output:  I&O's Detail      Lab Data                        11.4   10.01 )-----------( 141      ( 26 Feb 2019 07:02 )             32.8     02-26    142  |  110<H>  |  17  ----------------------------<  133<H>  3.5   |  24  |  1.60<H>    Ca    7.6<L>      26 Feb 2019 07:02  Mg     1.9     02-26    TPro  6.3  /  Alb  2.9<L>  /  TBili  2.2<H>  /  DBili  .80<H>  /  AST  17  /  ALT  20  /  AlkPhos  83  02-26            Review of Systems	      Objective     Physical Examination    heart s1s2  lung dec BS  abd soft  obese  poor dentition      Pertinent Lab findings & Imaging      Landon:  LIZETTE   Adequate UO     I&O's Detail           Discussed with:     Cultures:	        Radiology

## 2019-02-27 NOTE — PROGRESS NOTE ADULT - SUBJECTIVE AND OBJECTIVE BOX
Patient is a 59y old  Male who presents with a chief complaint of cough (27 Feb 2019 09:11)      INTERVAL HPI/OVERNIGHT EVENTS: Patient seen and examined. NAD. No complaints.    Vital Signs Last 24 Hrs  T(C): 36.7 (27 Feb 2019 05:34), Max: 37.5 (26 Feb 2019 20:42)  T(F): 98.1 (27 Feb 2019 05:34), Max: 99.5 (26 Feb 2019 20:42)  HR: 95 (27 Feb 2019 05:34) (82 - 95)  BP: 138/72 (27 Feb 2019 05:34) (97/64 - 138/72)  BP(mean): --  RR: 18 (27 Feb 2019 05:34) (16 - 18)  SpO2: 93% (27 Feb 2019 08:30) (87% - 95%)    02-27    143  |  112<H>  |  19  ----------------------------<  104<H>  3.2<L>   |  22  |  1.60<H>    Ca    7.3<L>      27 Feb 2019 08:19  Mg     2.2     02-27    TPro  6.3  /  Alb  2.9<L>  /  TBili  2.2<H>  /  DBili  .80<H>  /  AST  17  /  ALT  20  /  AlkPhos  83  02-26                          10.5   5.48  )-----------( 156      ( 27 Feb 2019 08:19 )             30.7       CAPILLARY BLOOD GLUCOSE                  acetaminophen   Tablet .. 650 milliGRAM(s) Oral every 6 hours PRN  aluminum hydroxide/magnesium hydroxide/simethicone Suspension 30 milliLiter(s) Oral every 6 hours  atorvastatin 20 milliGRAM(s) Oral at bedtime  benzonatate 100 milliGRAM(s) Oral three times a day  clonazePAM Tablet 1 milliGRAM(s) Oral three times a day PRN  cloZAPine 100 milliGRAM(s) Oral three times a day  docusate sodium 100 milliGRAM(s) Oral three times a day  enoxaparin Injectable 40 milliGRAM(s) SubCutaneous daily  glycerin Suppository - Adult 1 Suppository(s) Rectal at bedtime  guaiFENesin    Syrup 100 milliGRAM(s) Oral every 6 hours PRN  guaiFENesin  milliGRAM(s) Oral every 12 hours  influenza   Vaccine 0.5 milliLiter(s) IntraMuscular once  melatonin 3 milliGRAM(s) Oral at bedtime PRN  pantoprazole    Tablet 40 milliGRAM(s) Oral before breakfast  PARoxetine 30 milliGRAM(s) Oral daily  piperacillin/tazobactam IVPB. 3.375 Gram(s) IV Intermittent every 8 hours  polyethylene glycol 3350 17 Gram(s) Oral daily  senna 2 Tablet(s) Oral at bedtime  sodium chloride 0.9%. 1000 milliLiter(s) IV Continuous <Continuous>  traMADol 25 milliGRAM(s) Oral every 6 hours PRN  traMADol 50 milliGRAM(s) Oral every 6 hours PRN              REVIEW OF SYSTEMS:  CONSTITUTIONAL: No fever, no weight loss, or no fatigue  NECK: No pain, no stiffness  RESPIRATORY: No cough, no wheezing, no chills, no hemoptysis, No shortness of breath  CARDIOVASCULAR: No chest pain, no palpitations, no dizziness, no leg swelling  GASTROINTESTINAL: No abdominal pain. No nausea, no vomiting, no hematemesis; No diarrhea, no constipation. No melena, no hematochezia.  GENITOURINARY: No dysuria, no frequency, no hematuria, no incontinence  NEUROLOGICAL: No headaches, no loss of strength, no numbness, no tremors  SKIN: No itching, no burning  MUSCULOSKELETAL: No joint pain, no swelling; No muscle, no back, no extremity pain  PSYCHIATRIC: No depression, no mood swings,   HEME/LYMPH: No easy bruising, no bleeding gums  ALLERY AND IMMUNOLOGIC: No hives       Consultant(s) Notes Reviewed:  [X] YES  [ ] NO    PHYSICAL EXAM:  GENERAL: NAD  HEAD:  Atraumatic, Normocephalic  EYES: EOMI, PERRLA, conjunctiva and sclera clear  ENMT: No tonsillar erythema, exudates, or enlargement; Moist mucous membranes  NECK: Supple, No JVD  NERVOUS SYSTEM:  Awake & alert  CHEST/LUNG: Clear to auscultation bilaterally; No rales, rhonchi, wheezing,  HEART: Regular rate and rhythm  ABDOMEN: Soft, Nontender, Nondistended; Bowel sounds present  EXTREMITIES:  No clubbing, cyanosis, or edema  LYMPH: No lymphadenopathy noted  SKIN: No rashes      Advanced care planning discussed with patient/family [X] YES   [ ] NO    Advanced care planning discussed with patient/family. Advanced care planning forms reviewed/discussed/completed. 20 minutes spent.

## 2019-02-28 LAB
ALBUMIN SERPL ELPH-MCNC: 2.4 G/DL — LOW (ref 3.3–5)
ALP SERPL-CCNC: 84 U/L — SIGNIFICANT CHANGE UP (ref 40–120)
ALT FLD-CCNC: 48 U/L — SIGNIFICANT CHANGE UP (ref 12–78)
ANION GAP SERPL CALC-SCNC: 8 MMOL/L — SIGNIFICANT CHANGE UP (ref 5–17)
AST SERPL-CCNC: 76 U/L — HIGH (ref 15–37)
BASOPHILS # BLD AUTO: 0 K/UL — SIGNIFICANT CHANGE UP (ref 0–0.2)
BASOPHILS NFR BLD AUTO: 0 % — SIGNIFICANT CHANGE UP (ref 0–2)
BILIRUB DIRECT SERPL-MCNC: 0.4 MG/DL — HIGH (ref 0.05–0.2)
BILIRUB INDIRECT FLD-MCNC: 0.5 MG/DL — SIGNIFICANT CHANGE UP (ref 0.2–1)
BILIRUB SERPL-MCNC: 0.9 MG/DL — SIGNIFICANT CHANGE UP (ref 0.2–1.2)
BUN SERPL-MCNC: 17 MG/DL — SIGNIFICANT CHANGE UP (ref 7–23)
CALCIUM SERPL-MCNC: 7.6 MG/DL — LOW (ref 8.5–10.1)
CHLORIDE SERPL-SCNC: 110 MMOL/L — HIGH (ref 96–108)
CK SERPL-CCNC: 517 U/L — HIGH (ref 26–308)
CO2 SERPL-SCNC: 24 MMOL/L — SIGNIFICANT CHANGE UP (ref 22–31)
CREAT SERPL-MCNC: 1.7 MG/DL — HIGH (ref 0.5–1.3)
CRP SERPL-MCNC: 19.11 MG/DL — HIGH (ref 0–0.4)
CRP SERPL-MCNC: 24.29 MG/DL — HIGH (ref 0–0.4)
EOSINOPHIL # BLD AUTO: 0 K/UL — SIGNIFICANT CHANGE UP (ref 0–0.5)
EOSINOPHIL NFR BLD AUTO: 0 % — SIGNIFICANT CHANGE UP (ref 0–6)
ERYTHROCYTE [SEDIMENTATION RATE] IN BLOOD: 93 MM/HR — HIGH (ref 0–20)
GLUCOSE SERPL-MCNC: 99 MG/DL — SIGNIFICANT CHANGE UP (ref 70–99)
HCT VFR BLD CALC: 30.8 % — LOW (ref 39–50)
HGB BLD-MCNC: 10.6 G/DL — LOW (ref 13–17)
LYMPHOCYTES # BLD AUTO: 0.73 K/UL — LOW (ref 1–3.3)
LYMPHOCYTES # BLD AUTO: 18 % — SIGNIFICANT CHANGE UP (ref 13–44)
MAGNESIUM SERPL-MCNC: 2.4 MG/DL — SIGNIFICANT CHANGE UP (ref 1.6–2.6)
MANUAL SMEAR VERIFICATION: SIGNIFICANT CHANGE UP
MCHC RBC-ENTMCNC: 29.9 PG — SIGNIFICANT CHANGE UP (ref 27–34)
MCHC RBC-ENTMCNC: 34.4 GM/DL — SIGNIFICANT CHANGE UP (ref 32–36)
MCV RBC AUTO: 87 FL — SIGNIFICANT CHANGE UP (ref 80–100)
MONOCYTES # BLD AUTO: 0.56 K/UL — SIGNIFICANT CHANGE UP (ref 0–0.9)
MONOCYTES NFR BLD AUTO: 14 % — SIGNIFICANT CHANGE UP (ref 2–14)
NEUTROPHILS # BLD AUTO: 2.54 K/UL — SIGNIFICANT CHANGE UP (ref 1.8–7.4)
NEUTROPHILS NFR BLD AUTO: 50 % — SIGNIFICANT CHANGE UP (ref 43–77)
NEUTS BAND # BLD: 13 % — HIGH (ref 0–8)
NRBC # BLD: 0 — SIGNIFICANT CHANGE UP
NRBC # BLD: SIGNIFICANT CHANGE UP /100 WBCS (ref 0–0)
NT-PROBNP SERPL-SCNC: 1561 PG/ML — HIGH (ref 0–125)
PLAT MORPH BLD: NORMAL — SIGNIFICANT CHANGE UP
PLATELET # BLD AUTO: 167 K/UL — SIGNIFICANT CHANGE UP (ref 150–400)
POTASSIUM SERPL-MCNC: 3.6 MMOL/L — SIGNIFICANT CHANGE UP (ref 3.5–5.3)
POTASSIUM SERPL-SCNC: 3.6 MMOL/L — SIGNIFICANT CHANGE UP (ref 3.5–5.3)
PROCALCITONIN SERPL-MCNC: 0.3 NG/ML — HIGH (ref 0–0.04)
PROT SERPL-MCNC: 6 G/DL — SIGNIFICANT CHANGE UP (ref 6–8.3)
RBC # BLD: 3.54 M/UL — LOW (ref 4.2–5.8)
RBC # FLD: 13.3 % — SIGNIFICANT CHANGE UP (ref 10.3–14.5)
RBC BLD AUTO: NORMAL — SIGNIFICANT CHANGE UP
SODIUM SERPL-SCNC: 142 MMOL/L — SIGNIFICANT CHANGE UP (ref 135–145)
T4 AB SER-ACNC: 5.2 UG/DL — SIGNIFICANT CHANGE UP (ref 4.6–12)
TROPONIN I SERPL-MCNC: <.015 NG/ML — SIGNIFICANT CHANGE UP (ref 0.01–0.04)
TSH SERPL-MCNC: 1.14 UIU/ML — SIGNIFICANT CHANGE UP (ref 0.36–3.74)
VARIANT LYMPHS # BLD: 5 % — SIGNIFICANT CHANGE UP (ref 0–6)
WBC # BLD: 4.03 K/UL — SIGNIFICANT CHANGE UP (ref 3.8–10.5)
WBC # FLD AUTO: 4.03 K/UL — SIGNIFICANT CHANGE UP (ref 3.8–10.5)

## 2019-02-28 RX ORDER — IBUPROFEN 200 MG
600 TABLET ORAL ONCE
Qty: 0 | Refills: 0 | Status: COMPLETED | OUTPATIENT
Start: 2019-02-28 | End: 2019-02-28

## 2019-02-28 RX ADMIN — PIPERACILLIN AND TAZOBACTAM 25 GRAM(S): 4; .5 INJECTION, POWDER, LYOPHILIZED, FOR SOLUTION INTRAVENOUS at 13:54

## 2019-02-28 RX ADMIN — Medication 3 MILLILITER(S): at 07:35

## 2019-02-28 RX ADMIN — Medication 30 MILLIGRAM(S): at 11:45

## 2019-02-28 RX ADMIN — Medication 100 MILLIGRAM(S): at 05:50

## 2019-02-28 RX ADMIN — PIPERACILLIN AND TAZOBACTAM 25 GRAM(S): 4; .5 INJECTION, POWDER, LYOPHILIZED, FOR SOLUTION INTRAVENOUS at 21:58

## 2019-02-28 RX ADMIN — Medication 100 MILLIGRAM(S): at 13:54

## 2019-02-28 RX ADMIN — Medication 600 MILLIGRAM(S): at 17:57

## 2019-02-28 RX ADMIN — SENNA PLUS 2 TABLET(S): 8.6 TABLET ORAL at 21:57

## 2019-02-28 RX ADMIN — ENOXAPARIN SODIUM 40 MILLIGRAM(S): 100 INJECTION SUBCUTANEOUS at 11:24

## 2019-02-28 RX ADMIN — CLOZAPINE 100 MILLIGRAM(S): 150 TABLET, ORALLY DISINTEGRATING ORAL at 05:50

## 2019-02-28 RX ADMIN — Medication 100 MILLIGRAM(S): at 21:58

## 2019-02-28 RX ADMIN — CLOZAPINE 100 MILLIGRAM(S): 150 TABLET, ORALLY DISINTEGRATING ORAL at 22:08

## 2019-02-28 RX ADMIN — Medication 3 MILLILITER(S): at 20:37

## 2019-02-28 RX ADMIN — PIPERACILLIN AND TAZOBACTAM 25 GRAM(S): 4; .5 INJECTION, POWDER, LYOPHILIZED, FOR SOLUTION INTRAVENOUS at 05:50

## 2019-02-28 RX ADMIN — Medication 650 MILLIGRAM(S): at 19:48

## 2019-02-28 RX ADMIN — ATORVASTATIN CALCIUM 20 MILLIGRAM(S): 80 TABLET, FILM COATED ORAL at 21:58

## 2019-02-28 RX ADMIN — Medication 600 MILLIGRAM(S): at 23:29

## 2019-02-28 RX ADMIN — Medication 100 MILLIGRAM(S): at 21:57

## 2019-02-28 RX ADMIN — Medication 650 MILLIGRAM(S): at 18:07

## 2019-02-28 RX ADMIN — Medication 3 MILLILITER(S): at 01:39

## 2019-02-28 RX ADMIN — Medication 3 MILLILITER(S): at 13:51

## 2019-02-28 RX ADMIN — Medication 600 MILLIGRAM(S): at 05:50

## 2019-02-28 RX ADMIN — Medication 30 MILLILITER(S): at 05:50

## 2019-02-28 RX ADMIN — PANTOPRAZOLE SODIUM 40 MILLIGRAM(S): 20 TABLET, DELAYED RELEASE ORAL at 05:50

## 2019-02-28 RX ADMIN — POLYETHYLENE GLYCOL 3350 17 GRAM(S): 17 POWDER, FOR SOLUTION ORAL at 11:25

## 2019-02-28 NOTE — PROGRESS NOTE ADULT - SUBJECTIVE AND OBJECTIVE BOX
infectious diseases progress note:    JAME CHAIDEZ is a 59y y. o. Male patient    Patient reports: "I still feel terrible and this cough is horrible."    ROS:    EYES:  Negative  blurry vision or double vision  GASTROINTESTINAL:  Negative for nausea, vomiting, diarrhea  -otherwise negative except for subjective    Allergies    No Known Allergies    Intolerances        ANTIBIOTICS/RELEVANT:  antimicrobials  piperacillin/tazobactam IVPB. 3.375 Gram(s) IV Intermittent every 8 hours    immunologic:  influenza   Vaccine 0.5 milliLiter(s) IntraMuscular once    OTHER:  acetaminophen   Tablet .. 650 milliGRAM(s) Oral every 6 hours PRN  ALBUTerol/ipratropium for Nebulization 3 milliLiter(s) Nebulizer every 6 hours  aluminum hydroxide/magnesium hydroxide/simethicone Suspension 30 milliLiter(s) Oral every 6 hours  atorvastatin 20 milliGRAM(s) Oral at bedtime  benzonatate 100 milliGRAM(s) Oral three times a day  clonazePAM Tablet 1 milliGRAM(s) Oral three times a day PRN  cloZAPine 100 milliGRAM(s) Oral three times a day  docusate sodium 100 milliGRAM(s) Oral three times a day  enoxaparin Injectable 40 milliGRAM(s) SubCutaneous daily  glycerin Suppository - Adult 1 Suppository(s) Rectal at bedtime  guaiFENesin    Syrup 100 milliGRAM(s) Oral every 6 hours PRN  guaiFENesin  milliGRAM(s) Oral every 12 hours  HYDROcodone/homatropine Syrup 5 milliLiter(s) Oral three times a day PRN  melatonin 3 milliGRAM(s) Oral at bedtime PRN  pantoprazole    Tablet 40 milliGRAM(s) Oral before breakfast  PARoxetine 30 milliGRAM(s) Oral daily  polyethylene glycol 3350 17 Gram(s) Oral daily  senna 2 Tablet(s) Oral at bedtime  traMADol 25 milliGRAM(s) Oral every 6 hours PRN  traMADol 50 milliGRAM(s) Oral every 6 hours PRN      Objective:  Last 24-Vital Signs Last 24 Hrs  T(C): 36.9 (28 Feb 2019 10:37), Max: 37.7 (27 Feb 2019 20:15)  T(F): 98.4 (28 Feb 2019 10:37), Max: 99.8 (27 Feb 2019 20:15)  HR: 84 (28 Feb 2019 07:35) (84 - 96)  BP: 109/67 (28 Feb 2019 04:51) (102/60 - 124/74)  BP(mean): --  RR: 18 (28 Feb 2019 04:51) (16 - 19)  SpO2: 92% (28 Feb 2019 07:35) (92% - 96%)    T(C): 36.9 (02-28-19 @ 10:37), Max: 37.7 (02-27-19 @ 20:15)  T(F): 98.4 (02-28-19 @ 10:37), Max: 99.8 (02-27-19 @ 20:15)  T(C): 36.9 (02-28-19 @ 10:37), Max: 38.3 (02-26-19 @ 06:14)  T(F): 98.4 (02-28-19 @ 10:37), Max: 100.9 (02-26-19 @ 06:14)  T(C): 36.9 (02-28-19 @ 10:37), Max: 38.3 (02-26-19 @ 06:14)  T(F): 98.4 (02-28-19 @ 10:37), Max: 100.9 (02-26-19 @ 06:14)    PHYSICAL EXAM:  Constitutional: Well-developed, well nourished  Eyes: PERRLA, EOMI  Ear/Nose/Throat: oropharynx normal	  Neck: no JVD, no lymphadenopathy, supple  Respiratory: no accessory muscle use, lung fields with noted asymmetry in anterior chest  Cardiovascular: RRR, normal S1, S2 no m/r/g  Gastrointestinal: soft, NT, no HSM, BS-normal  Extremities: no clubbing, no cyanosis, edema absent  Neuro: patient alert, oriented and appropriate  Skin: no sig lesions      LABS:                        10.6   4.03  )-----------( 167      ( 28 Feb 2019 08:37 )             30.8       WBC 4.03  02-28 @ 08:37  WBC 5.48  02-27 @ 08:19  WBC 10.01  02-26 @ 07:02  WBC 12.53  02-25 @ 19:38      02-28    142  |  110<H>  |  17  ----------------------------<  99  3.6   |  24  |  1.70<H>    Ca    7.6<L>      28 Feb 2019 08:37  Mg     2.4     02-28    TPro  6.0  /  Alb  2.4<L>  /  TBili  0.9  /  DBili  .40<H>  /  AST  76<H>  /  ALT  48  /  AlkPhos  84  02-28      Creatinine, Serum: 1.70 mg/dL (02-28-19 @ 08:37)  Creatinine, Serum: 1.60 mg/dL (02-27-19 @ 08:19)  Creatinine, Serum: 1.60 mg/dL (02-26-19 @ 07:02)  Creatinine, Serum: 1.60 mg/dL (02-25-19 @ 19:38)      MICROBIOLOGY:        Culture - Blood (collected 26 Feb 2019 02:52)  Source: .Blood Blood-Venous  Preliminary Report (27 Feb 2019 03:01):    No growth to date.    Culture - Blood (collected 26 Feb 2019 02:52)  Source: .Blood Blood-Venous  Preliminary Report (27 Feb 2019 03:01):    No growth to date.          RADIOLOGY & ADDITIONAL STUDIES:    < from: Xray Chest 1 View- PORTABLE-Urgent (02.27.19 @ 17:49) >    EXAM:  XR CHEST PORTABLE URGENT 1V                            PROCEDURE DATE:  02/27/2019          INTERPRETATION:  Chest portable.    Clinical History: Shortness of breath and cough.    Comparison: 2/25/2019.    Single AP view submitted.    The evaluation of the cardiomediastinal silhouette is limited on portable   technique.    There is persistent right perihilar upper lobe airspace consolidation.  There are low lung volumes resulting in crowding of the bronchovascular   markings at the lung bases.

## 2019-02-28 NOTE — PROVIDER CONTACT NOTE (OTHER) - SITUATION
Dr. Burt notified/aware of pts elevated temp of 101.5, awaiting motrin as prescribed to administer, will continue to monitor

## 2019-02-28 NOTE — PROGRESS NOTE ADULT - SUBJECTIVE AND OBJECTIVE BOX
INTERVAL HPI/OVERNIGHT EVENTS:  No new overnight event.  No N/V/D.  Tolerating diet.  less pain     Allergies    No Known Allergies    Intolerances  General:  No wt loss, fevers, chills, night sweats, fatigue,   Eyes:  Good vision, no reported pain  ENT:  No sore throat, pain, runny nose, dysphagia  CV:  No pain, palpitations, hypo/hypertension  Resp:  No dyspnea, cough, tachypnea, wheezing  GI:  No pain, No nausea, No vomiting, No diarrhea, No constipation, No weight loss, No fever, No pruritis, No rectal bleeding, No tarry stools, No dysphagia,  :  No pain, bleeding, incontinence, nocturia  Muscle:  No pain, weakness  Neuro:  No weakness, tingling, memory problems  Psych:  No fatigue, insomnia, mood problems, depression  Endocrine:  No polyuria, polydipsia, cold/heat intolerance  Heme:  No petechiae, ecchymosis, easy bruisability  Skin:  No rash, tattoos, scars, edema      PHYSICAL EXAM:   Vital Signs:  Vital Signs Last 24 Hrs  T(C): 36.9 (28 Feb 2019 10:37), Max: 37.7 (27 Feb 2019 20:15)  T(F): 98.4 (28 Feb 2019 10:37), Max: 99.8 (27 Feb 2019 20:15)  HR: 95 (28 Feb 2019 11:38) (84 - 96)  BP: 101/66 (28 Feb 2019 11:38) (101/66 - 124/74)  BP(mean): --  RR: 18 (28 Feb 2019 04:51) (16 - 19)  SpO2: 92% (28 Feb 2019 12:51) (92% - 96%)  Daily     Daily I&O's Summary    27 Feb 2019 07:01  -  28 Feb 2019 07:00  --------------------------------------------------------  IN: 400 mL / OUT: 0 mL / NET: 400 mL        GENERAL:  Appears stated age, well-groomed, well-nourished, no distress  HEENT:  NC/AT,  conjunctivae clear and pink, no thyromegaly, nodules, adenopathy, no JVD, sclera -anicteric  CHEST:  Full & symmetric excursion, no increased effort, breath sounds clear  HEART:  Regular rhythm, S1, S2, no murmur/rub/S3/S4, no abdominal bruit, no edema  ABDOMEN:  Soft, non-tender, non-distended, normoactive bowel sounds,  no masses ,no hepato-splenomegaly, no signs of chronic liver disease  EXTEREMITIES:  no cyanosis,clubbing or edema  SKIN:  No rash/erythema/ecchymoses/petechiae/wounds/abscess/warm/dry  NEURO:  Alert, oriented, no asterixis, no tremor, no encephalopathy      LABS:                        10.6   4.03  )-----------( 167      ( 28 Feb 2019 08:37 )             30.8     02-28    142  |  110<H>  |  17  ----------------------------<  99  3.6   |  24  |  1.70<H>    Ca    7.6<L>      28 Feb 2019 08:37  Mg     2.4     02-28    TPro  6.0  /  Alb  2.4<L>  /  TBili  0.9  /  DBili  .40<H>  /  AST  76<H>  /  ALT  48  /  AlkPhos  84  02-28        amylase   lipaseLipase, Serum: 45 U/L (02-25 @ 19:38)    RADIOLOGY & ADDITIONAL TESTS:

## 2019-02-28 NOTE — PHYSICAL THERAPY INITIAL EVALUATION ADULT - TRANSFER SAFETY CONCERNS NOTED: SIT/STAND, REHAB EVAL
decreased balance during turns/decreased proprioception/Retroleans/decreased weight-shifting ability/decreased safety awareness/losing balance/decreased sequencing ability

## 2019-02-28 NOTE — PHYSICAL THERAPY INITIAL EVALUATION ADULT - GENERAL OBSERVATIONS, REHAB EVAL
Pt received in bed in semi-nicholson position /c remote tele monitors and pulse ox plinth on. Pt /c flat affect, obese but agreeable /c PT eval.

## 2019-02-28 NOTE — PHYSICAL THERAPY INITIAL EVALUATION ADULT - IMPAIRMENTS FOUND, PT EVAL
gait, locomotion, and balance/poor safety awareness/aerobic capacity/endurance/arousal, attention, and cognition

## 2019-02-28 NOTE — PHYSICAL THERAPY INITIAL EVALUATION ADULT - DID THE PATIENT HAVE SURGERY?
n/a/Chest X-ray: Persistent right perihilar upper lobe airspace consolidation; CT Chest: Extensive consolidative Pneumonia to right upper lobe, moderate splenomegaly; Pro-BNP: 1561

## 2019-02-28 NOTE — PHYSICAL THERAPY INITIAL EVALUATION ADULT - PERTINENT HX OF CURRENT PROBLEM, REHAB EVAL
As per H&P:"Pt is a 60 yo male who presents to the ED with a cc of flu-like illness.  PMHx of h/o prior drug abuse currently in remission, h/o ETOH abuse currently in remission, h/o pleural effusion, HLD, migraine, obesity, pneumonia, Schizophrenia.  Pt reports that for the last several days he has not been feeling well.  He reports fevers T max of 103.5 with associated chills, SOB, and cough productive of yellow sputum."

## 2019-02-28 NOTE — PROGRESS NOTE ADULT - SUBJECTIVE AND OBJECTIVE BOX
Patient is a 59y old  Male who presents with a chief complaint of cough (28 Feb 2019 10:46)      INTERVAL HPI/OVERNIGHT EVENTS: Patient seen and examined. NAD. No complaints x cough    Vital Signs Last 24 Hrs  T(C): 36.9 (28 Feb 2019 10:37), Max: 37.7 (27 Feb 2019 20:15)  T(F): 98.4 (28 Feb 2019 10:37), Max: 99.8 (27 Feb 2019 20:15)  HR: 84 (28 Feb 2019 07:35) (84 - 96)  BP: 109/67 (28 Feb 2019 04:51) (102/60 - 124/74)  BP(mean): --  RR: 18 (28 Feb 2019 04:51) (16 - 19)  SpO2: 92% (28 Feb 2019 07:35) (92% - 96%)    02-28    142  |  110<H>  |  17  ----------------------------<  99  3.6   |  24  |  1.70<H>    Ca    7.6<L>      28 Feb 2019 08:37  Mg     2.4     02-28    TPro  6.0  /  Alb  2.4<L>  /  TBili  0.9  /  DBili  .40<H>  /  AST  76<H>  /  ALT  48  /  AlkPhos  84  02-28                          10.6   4.03  )-----------( 167      ( 28 Feb 2019 08:37 )             30.8       CAPILLARY BLOOD GLUCOSE                  acetaminophen   Tablet .. 650 milliGRAM(s) Oral every 6 hours PRN  ALBUTerol/ipratropium for Nebulization 3 milliLiter(s) Nebulizer every 6 hours  aluminum hydroxide/magnesium hydroxide/simethicone Suspension 30 milliLiter(s) Oral every 6 hours  atorvastatin 20 milliGRAM(s) Oral at bedtime  benzonatate 100 milliGRAM(s) Oral three times a day  clonazePAM Tablet 1 milliGRAM(s) Oral three times a day PRN  cloZAPine 100 milliGRAM(s) Oral three times a day  docusate sodium 100 milliGRAM(s) Oral three times a day  enoxaparin Injectable 40 milliGRAM(s) SubCutaneous daily  glycerin Suppository - Adult 1 Suppository(s) Rectal at bedtime  guaiFENesin    Syrup 100 milliGRAM(s) Oral every 6 hours PRN  guaiFENesin  milliGRAM(s) Oral every 12 hours  HYDROcodone/homatropine Syrup 5 milliLiter(s) Oral three times a day PRN  influenza   Vaccine 0.5 milliLiter(s) IntraMuscular once  melatonin 3 milliGRAM(s) Oral at bedtime PRN  pantoprazole    Tablet 40 milliGRAM(s) Oral before breakfast  PARoxetine 30 milliGRAM(s) Oral daily  piperacillin/tazobactam IVPB. 3.375 Gram(s) IV Intermittent every 8 hours  polyethylene glycol 3350 17 Gram(s) Oral daily  senna 2 Tablet(s) Oral at bedtime  traMADol 25 milliGRAM(s) Oral every 6 hours PRN  traMADol 50 milliGRAM(s) Oral every 6 hours PRN              REVIEW OF SYSTEMS:  CONSTITUTIONAL: No fever, no weight loss, or no fatigue  NECK: No pain, no stiffness  RESPIRATORY: + cough, no wheezing, no chills, no hemoptysis, No shortness of breath  CARDIOVASCULAR: No chest pain, no palpitations, no dizziness, no leg swelling  GASTROINTESTINAL: No abdominal pain. No nausea, no vomiting, no hematemesis; No diarrhea, no constipation. No melena, no hematochezia.  GENITOURINARY: No dysuria, no frequency, no hematuria, no incontinence  NEUROLOGICAL: No headaches, no loss of strength, no numbness, no tremors  SKIN: No itching, no burning  MUSCULOSKELETAL: No joint pain, no swelling; No muscle, no back, no extremity pain  PSYCHIATRIC: No depression, no mood swings,   HEME/LYMPH: No easy bruising, no bleeding gums  ALLERY AND IMMUNOLOGIC: No hives       Consultant(s) Notes Reviewed:  [X] YES  [ ] NO    PHYSICAL EXAM:  GENERAL: NAD  HEAD:  Atraumatic, Normocephalic  EYES: EOMI, PERRLA, conjunctiva and sclera clear  ENMT: No tonsillar erythema, exudates, or enlargement; Moist mucous membranes  NECK: Supple, No JVD  NERVOUS SYSTEM:  Awake & alert  CHEST/LUNG: Clear to auscultation bilaterally; No rales, rhonchi, wheezing,  HEART: Regular rate and rhythm  ABDOMEN: Soft, Nontender, Nondistended; Bowel sounds present  EXTREMITIES:  No clubbing, cyanosis, or edema  LYMPH: No lymphadenopathy noted  SKIN: No rashes      Advanced care planning discussed with patient/family [X] YES   [ ] NO    Advanced care planning discussed with patient/family. Advanced care planning forms reviewed/discussed/completed. 20 minutes spent.

## 2019-02-28 NOTE — PROGRESS NOTE ADULT - SUBJECTIVE AND OBJECTIVE BOX
Date/Time Patient Seen:  		  Referring MD:   Data Reviewed	       Patient is a 59y old  Male who presents with a chief complaint of cough (27 Feb 2019 11:05)      Subjective/HPI     PAST MEDICAL & SURGICAL HISTORY:  H/O pleural effusion: DX IN 2011 when pt had pneumonia  chronic condition now  CIRILO (obstructive sleep apnea)  Obesity  Migraines  Pneumonia: 2011  Smoking hx  H/O ETOH abuse: sober x 20 yrs  Drug abuse, in remission: sober x 20 yrs  HLD (hyperlipidemia)  Schizophrenia: pt never stated he had schizophrenia during interview but is on medication for it.  Skin lesion: face   &quot;pre-cancerous&quot;  Cyst: scalp excised 20 yrs ago        Medication list         MEDICATIONS  (STANDING):  ALBUTerol/ipratropium for Nebulization 3 milliLiter(s) Nebulizer every 6 hours  aluminum hydroxide/magnesium hydroxide/simethicone Suspension 30 milliLiter(s) Oral every 6 hours  atorvastatin 20 milliGRAM(s) Oral at bedtime  benzonatate 100 milliGRAM(s) Oral three times a day  cloZAPine 100 milliGRAM(s) Oral three times a day  docusate sodium 100 milliGRAM(s) Oral three times a day  enoxaparin Injectable 40 milliGRAM(s) SubCutaneous daily  glycerin Suppository - Adult 1 Suppository(s) Rectal at bedtime  guaiFENesin  milliGRAM(s) Oral every 12 hours  influenza   Vaccine 0.5 milliLiter(s) IntraMuscular once  pantoprazole    Tablet 40 milliGRAM(s) Oral before breakfast  PARoxetine 30 milliGRAM(s) Oral daily  piperacillin/tazobactam IVPB. 3.375 Gram(s) IV Intermittent every 8 hours  polyethylene glycol 3350 17 Gram(s) Oral daily  senna 2 Tablet(s) Oral at bedtime    MEDICATIONS  (PRN):  acetaminophen   Tablet .. 650 milliGRAM(s) Oral every 6 hours PRN Temp greater or equal to 38C (100.4F), Mild Pain (1 - 3)  clonazePAM Tablet 1 milliGRAM(s) Oral three times a day PRN anxiety/agitation  guaiFENesin    Syrup 100 milliGRAM(s) Oral every 6 hours PRN Cough  HYDROcodone/homatropine Syrup 5 milliLiter(s) Oral three times a day PRN severe cough  melatonin 3 milliGRAM(s) Oral at bedtime PRN Insomnia  traMADol 25 milliGRAM(s) Oral every 6 hours PRN Moderate Pain (4 - 6)  traMADol 50 milliGRAM(s) Oral every 6 hours PRN Severe Pain (7 - 10)         Vitals log        ICU Vital Signs Last 24 Hrs  T(C): 37.7 (28 Feb 2019 04:51), Max: 37.7 (27 Feb 2019 20:15)  T(F): 99.8 (28 Feb 2019 04:51), Max: 99.8 (27 Feb 2019 20:15)  HR: 86 (28 Feb 2019 04:51) (86 - 96)  BP: 109/67 (28 Feb 2019 04:51) (102/60 - 124/74)  BP(mean): --  ABP: --  ABP(mean): --  RR: 18 (28 Feb 2019 04:51) (16 - 19)  SpO2: 93% (28 Feb 2019 04:51) (87% - 96%)           Input and Output:  I&O's Detail    27 Feb 2019 07:01  -  28 Feb 2019 07:00  --------------------------------------------------------  IN:    sodium chloride 0.9%: 400 mL  Total IN: 400 mL    OUT:  Total OUT: 0 mL    Total NET: 400 mL          Lab Data                        10.5   5.48  )-----------( 156      ( 27 Feb 2019 08:19 )             30.7     02-27    143  |  112<H>  |  19  ----------------------------<  104<H>  3.2<L>   |  22  |  1.60<H>    Ca    7.3<L>      27 Feb 2019 08:19  Mg     2.2     02-27              Review of Systems	      Objective     Physical Examination    heart s1s2  lung dec BS  on 02 support  abd soft  obese  cn grossly int      Pertinent Lab findings & Imaging      Landon:  NO   Adequate UO     I&O's Detail    27 Feb 2019 07:01  -  28 Feb 2019 07:00  --------------------------------------------------------  IN:    sodium chloride 0.9%: 400 mL  Total IN: 400 mL    OUT:  Total OUT: 0 mL    Total NET: 400 mL               Discussed with:     Cultures:	        Radiology

## 2019-02-28 NOTE — PHYSICAL THERAPY INITIAL EVALUATION ADULT - MANUAL MUSCLE TESTING RESULTS, REHAB EVAL
BUE/LE grossly >3+/5 (noted to not hold muscle testing positions well, cues for proper technique needed)/grossly assessed due to

## 2019-03-01 LAB
ANION GAP SERPL CALC-SCNC: 6 MMOL/L — SIGNIFICANT CHANGE UP (ref 5–17)
BASOPHILS # BLD AUTO: 0.01 K/UL — SIGNIFICANT CHANGE UP (ref 0–0.2)
BASOPHILS NFR BLD AUTO: 0.3 % — SIGNIFICANT CHANGE UP (ref 0–2)
BUN SERPL-MCNC: 15 MG/DL — SIGNIFICANT CHANGE UP (ref 7–23)
CALCIUM SERPL-MCNC: 7.9 MG/DL — LOW (ref 8.5–10.1)
CHLORIDE SERPL-SCNC: 109 MMOL/L — HIGH (ref 96–108)
CO2 SERPL-SCNC: 28 MMOL/L — SIGNIFICANT CHANGE UP (ref 22–31)
CREAT SERPL-MCNC: 1.3 MG/DL — SIGNIFICANT CHANGE UP (ref 0.5–1.3)
EOSINOPHIL # BLD AUTO: 0.04 K/UL — SIGNIFICANT CHANGE UP (ref 0–0.5)
EOSINOPHIL NFR BLD AUTO: 1 % — SIGNIFICANT CHANGE UP (ref 0–6)
GLUCOSE SERPL-MCNC: 103 MG/DL — HIGH (ref 70–99)
HCT VFR BLD CALC: 30.7 % — LOW (ref 39–50)
HGB BLD-MCNC: 10.5 G/DL — LOW (ref 13–17)
IMM GRANULOCYTES NFR BLD AUTO: 3 % — HIGH (ref 0–1.5)
LYMPHOCYTES # BLD AUTO: 0.29 K/UL — LOW (ref 1–3.3)
LYMPHOCYTES # BLD AUTO: 7.3 % — LOW (ref 13–44)
MAGNESIUM SERPL-MCNC: 2.4 MG/DL — SIGNIFICANT CHANGE UP (ref 1.6–2.6)
MCHC RBC-ENTMCNC: 29.8 PG — SIGNIFICANT CHANGE UP (ref 27–34)
MCHC RBC-ENTMCNC: 34.2 GM/DL — SIGNIFICANT CHANGE UP (ref 32–36)
MCV RBC AUTO: 87.2 FL — SIGNIFICANT CHANGE UP (ref 80–100)
MONOCYTES # BLD AUTO: 0.52 K/UL — SIGNIFICANT CHANGE UP (ref 0–0.9)
MONOCYTES NFR BLD AUTO: 13.2 % — SIGNIFICANT CHANGE UP (ref 2–14)
NEUTROPHILS # BLD AUTO: 2.97 K/UL — SIGNIFICANT CHANGE UP (ref 1.8–7.4)
NEUTROPHILS NFR BLD AUTO: 75.2 % — SIGNIFICANT CHANGE UP (ref 43–77)
NRBC # BLD: 0 /100 WBCS — SIGNIFICANT CHANGE UP (ref 0–0)
PLATELET # BLD AUTO: 158 K/UL — SIGNIFICANT CHANGE UP (ref 150–400)
POTASSIUM SERPL-MCNC: 3.6 MMOL/L — SIGNIFICANT CHANGE UP (ref 3.5–5.3)
POTASSIUM SERPL-SCNC: 3.6 MMOL/L — SIGNIFICANT CHANGE UP (ref 3.5–5.3)
RBC # BLD: 3.52 M/UL — LOW (ref 4.2–5.8)
RBC # FLD: 13.2 % — SIGNIFICANT CHANGE UP (ref 10.3–14.5)
SODIUM SERPL-SCNC: 143 MMOL/L — SIGNIFICANT CHANGE UP (ref 135–145)
WBC # BLD: 3.95 K/UL — SIGNIFICANT CHANGE UP (ref 3.8–10.5)
WBC # FLD AUTO: 3.95 K/UL — SIGNIFICANT CHANGE UP (ref 3.8–10.5)

## 2019-03-01 RX ORDER — ONDANSETRON 8 MG/1
4 TABLET, FILM COATED ORAL ONCE
Qty: 0 | Refills: 0 | Status: COMPLETED | OUTPATIENT
Start: 2019-03-01 | End: 2019-03-01

## 2019-03-01 RX ADMIN — Medication 3 MILLIGRAM(S): at 22:15

## 2019-03-01 RX ADMIN — Medication 600 MILLIGRAM(S): at 05:26

## 2019-03-01 RX ADMIN — Medication 3 MILLILITER(S): at 08:32

## 2019-03-01 RX ADMIN — ONDANSETRON 4 MILLIGRAM(S): 8 TABLET, FILM COATED ORAL at 20:48

## 2019-03-01 RX ADMIN — PIPERACILLIN AND TAZOBACTAM 25 GRAM(S): 4; .5 INJECTION, POWDER, LYOPHILIZED, FOR SOLUTION INTRAVENOUS at 22:02

## 2019-03-01 RX ADMIN — Medication 3 MILLILITER(S): at 01:33

## 2019-03-01 RX ADMIN — Medication 650 MILLIGRAM(S): at 19:01

## 2019-03-01 RX ADMIN — Medication 100 MILLIGRAM(S): at 22:02

## 2019-03-01 RX ADMIN — ATORVASTATIN CALCIUM 20 MILLIGRAM(S): 80 TABLET, FILM COATED ORAL at 22:02

## 2019-03-01 RX ADMIN — Medication 30 MILLILITER(S): at 23:36

## 2019-03-01 RX ADMIN — Medication 650 MILLIGRAM(S): at 13:00

## 2019-03-01 RX ADMIN — Medication 30 MILLIGRAM(S): at 12:40

## 2019-03-01 RX ADMIN — Medication 30 MILLILITER(S): at 17:31

## 2019-03-01 RX ADMIN — PIPERACILLIN AND TAZOBACTAM 25 GRAM(S): 4; .5 INJECTION, POWDER, LYOPHILIZED, FOR SOLUTION INTRAVENOUS at 14:27

## 2019-03-01 RX ADMIN — Medication 100 MILLIGRAM(S): at 05:27

## 2019-03-01 RX ADMIN — Medication 100 MILLIGRAM(S): at 14:28

## 2019-03-01 RX ADMIN — Medication 100 MILLIGRAM(S): at 05:26

## 2019-03-01 RX ADMIN — CLOZAPINE 100 MILLIGRAM(S): 150 TABLET, ORALLY DISINTEGRATING ORAL at 22:02

## 2019-03-01 RX ADMIN — Medication 3 MILLILITER(S): at 14:55

## 2019-03-01 RX ADMIN — Medication 600 MILLIGRAM(S): at 17:31

## 2019-03-01 RX ADMIN — PANTOPRAZOLE SODIUM 40 MILLIGRAM(S): 20 TABLET, DELAYED RELEASE ORAL at 05:27

## 2019-03-01 RX ADMIN — Medication 600 MILLIGRAM(S): at 00:00

## 2019-03-01 RX ADMIN — Medication 650 MILLIGRAM(S): at 20:05

## 2019-03-01 RX ADMIN — SENNA PLUS 2 TABLET(S): 8.6 TABLET ORAL at 22:02

## 2019-03-01 RX ADMIN — CLOZAPINE 100 MILLIGRAM(S): 150 TABLET, ORALLY DISINTEGRATING ORAL at 05:27

## 2019-03-01 RX ADMIN — CLOZAPINE 100 MILLIGRAM(S): 150 TABLET, ORALLY DISINTEGRATING ORAL at 14:28

## 2019-03-01 RX ADMIN — PIPERACILLIN AND TAZOBACTAM 25 GRAM(S): 4; .5 INJECTION, POWDER, LYOPHILIZED, FOR SOLUTION INTRAVENOUS at 05:27

## 2019-03-01 RX ADMIN — Medication 650 MILLIGRAM(S): at 12:40

## 2019-03-01 RX ADMIN — ENOXAPARIN SODIUM 40 MILLIGRAM(S): 100 INJECTION SUBCUTANEOUS at 14:28

## 2019-03-01 NOTE — PROGRESS NOTE ADULT - ASSESSMENT
60 yo male admitted with Pneumonia, very dense on my review of the CT. Still febrile, likely reflecting severity of disease and duration before seeking medical care.

## 2019-03-01 NOTE — CONSULT NOTE ADULT - ASSESSMENT
Pneumonia, very dense on my review of the CT  Overall seems improved- WBC down, temps moderated.  patient HD stable  Current antibiotics adequate      Suggestions--  Continue Zosyn  If remains stable hope to transition to PO antibiotics e.g. augmentin in the next 48h  F/U cx  F/U CBC  Monitor temperature cure    Thank you for the courtesy of this referral.  Oscar Yepez MD  [phone]
59M with h/o smoking, Etoh abuse, pleural effusion and hyperlipidemia for endoscopy.    Suggest:    1. Cardiac optimized for EGD  2. Continue with statin for hyperlipidemia  3. Advised to not resume cigarette smoking.

## 2019-03-01 NOTE — PROGRESS NOTE ADULT - SUBJECTIVE AND OBJECTIVE BOX
Date/Time Patient Seen:  		  Referring MD:   Data Reviewed	       Patient is a 59y old  Male who presents with a chief complaint of cough (28 Feb 2019 12:58)      Subjective/HPI     PAST MEDICAL & SURGICAL HISTORY:  H/O pleural effusion: DX IN 2011 when pt had pneumonia  chronic condition now  CIRILO (obstructive sleep apnea)  Obesity  Migraines  Pneumonia: 2011  Smoking hx  H/O ETOH abuse: sober x 20 yrs  Drug abuse, in remission: sober x 20 yrs  HLD (hyperlipidemia)  Schizophrenia: pt never stated he had schizophrenia during interview but is on medication for it.  Skin lesion: face   &quot;pre-cancerous&quot;  Cyst: scalp excised 20 yrs ago        Medication list         MEDICATIONS  (STANDING):  ALBUTerol/ipratropium for Nebulization 3 milliLiter(s) Nebulizer every 6 hours  aluminum hydroxide/magnesium hydroxide/simethicone Suspension 30 milliLiter(s) Oral every 6 hours  atorvastatin 20 milliGRAM(s) Oral at bedtime  benzonatate 100 milliGRAM(s) Oral three times a day  cloZAPine 100 milliGRAM(s) Oral three times a day  docusate sodium 100 milliGRAM(s) Oral three times a day  enoxaparin Injectable 40 milliGRAM(s) SubCutaneous daily  glycerin Suppository - Adult 1 Suppository(s) Rectal at bedtime  guaiFENesin  milliGRAM(s) Oral every 12 hours  influenza   Vaccine 0.5 milliLiter(s) IntraMuscular once  pantoprazole    Tablet 40 milliGRAM(s) Oral before breakfast  PARoxetine 30 milliGRAM(s) Oral daily  piperacillin/tazobactam IVPB. 3.375 Gram(s) IV Intermittent every 8 hours  polyethylene glycol 3350 17 Gram(s) Oral daily  senna 2 Tablet(s) Oral at bedtime    MEDICATIONS  (PRN):  acetaminophen   Tablet .. 650 milliGRAM(s) Oral every 6 hours PRN Temp greater or equal to 38C (100.4F), Mild Pain (1 - 3)  clonazePAM Tablet 1 milliGRAM(s) Oral three times a day PRN anxiety/agitation  guaiFENesin    Syrup 100 milliGRAM(s) Oral every 6 hours PRN Cough  HYDROcodone/homatropine Syrup 5 milliLiter(s) Oral three times a day PRN severe cough  melatonin 3 milliGRAM(s) Oral at bedtime PRN Insomnia  traMADol 25 milliGRAM(s) Oral every 6 hours PRN Moderate Pain (4 - 6)  traMADol 50 milliGRAM(s) Oral every 6 hours PRN Severe Pain (7 - 10)         Vitals log        ICU Vital Signs Last 24 Hrs  T(C): 36.7 (01 Mar 2019 04:51), Max: 39.1 (28 Feb 2019 18:08)  T(F): 98.1 (01 Mar 2019 04:51), Max: 102.3 (28 Feb 2019 18:08)  HR: 76 (01 Mar 2019 04:51) (76 - 95)  BP: 106/66 (01 Mar 2019 04:51) (101/66 - 108/70)  BP(mean): --  ABP: --  ABP(mean): --  RR: 18 (01 Mar 2019 04:51) (16 - 18)  SpO2: 94% (01 Mar 2019 04:51) (87% - 96%)           Input and Output:  I&O's Detail    28 Feb 2019 07:01  -  01 Mar 2019 07:00  --------------------------------------------------------  IN:    IV PiggyBack: 100 mL  Total IN: 100 mL    OUT:  Total OUT: 0 mL    Total NET: 100 mL          Lab Data                        10.6   4.03  )-----------( 167      ( 28 Feb 2019 08:37 )             30.8     02-28    142  |  110<H>  |  17  ----------------------------<  99  3.6   |  24  |  1.70<H>    Ca    7.6<L>      28 Feb 2019 08:37  Mg     2.4     02-28    TPro  6.0  /  Alb  2.4<L>  /  TBili  0.9  /  DBili  .40<H>  /  AST  76<H>  /  ALT  48  /  AlkPhos  84  02-28      CARDIAC MARKERS ( 28 Feb 2019 08:37 )  <.015 ng/mL / x     / 517 U/L / x     / x            Review of Systems	      Objective     Physical Examination    obese  snoring on sleep exam  heart s1s2  lung dec BS      Pertinent Lab findings & Imaging      Landon:  NO   Adequate UO     I&O's Detail    28 Feb 2019 07:01  -  01 Mar 2019 07:00  --------------------------------------------------------  IN:    IV PiggyBack: 100 mL  Total IN: 100 mL    OUT:  Total OUT: 0 mL    Total NET: 100 mL               Discussed with:     Cultures:	        Radiology

## 2019-03-01 NOTE — PROGRESS NOTE ADULT - SUBJECTIVE AND OBJECTIVE BOX
Patient is a 59y old  Male who presents with a chief complaint of cough (01 Mar 2019 09:07)      INTERVAL HPI/OVERNIGHT EVENTS: Patient seen and examined. NAD. No complaints x cough    Vital Signs Last 24 Hrs  T(C): 36.7 (01 Mar 2019 04:51), Max: 39.1 (28 Feb 2019 18:08)  T(F): 98.1 (01 Mar 2019 04:51), Max: 102.3 (28 Feb 2019 18:08)  HR: 76 (01 Mar 2019 04:51) (76 - 95)  BP: 106/66 (01 Mar 2019 04:51) (101/66 - 108/70)  BP(mean): --  RR: 18 (01 Mar 2019 04:51) (16 - 18)  SpO2: 94% (01 Mar 2019 04:51) (87% - 96%)    03-01    143  |  109<H>  |  15  ----------------------------<  103<H>  3.6   |  28  |  1.30    Ca    7.9<L>      01 Mar 2019 08:56  Mg     2.4     03-01    TPro  6.0  /  Alb  2.4<L>  /  TBili  0.9  /  DBili  .40<H>  /  AST  76<H>  /  ALT  48  /  AlkPhos  84  02-28                          10.5   3.95  )-----------( 158      ( 01 Mar 2019 08:56 )             30.7       CAPILLARY BLOOD GLUCOSE                  acetaminophen   Tablet .. 650 milliGRAM(s) Oral every 6 hours PRN  ALBUTerol/ipratropium for Nebulization 3 milliLiter(s) Nebulizer every 6 hours  aluminum hydroxide/magnesium hydroxide/simethicone Suspension 30 milliLiter(s) Oral every 6 hours  atorvastatin 20 milliGRAM(s) Oral at bedtime  benzonatate 100 milliGRAM(s) Oral three times a day  clonazePAM Tablet 1 milliGRAM(s) Oral three times a day PRN  cloZAPine 100 milliGRAM(s) Oral three times a day  docusate sodium 100 milliGRAM(s) Oral three times a day  enoxaparin Injectable 40 milliGRAM(s) SubCutaneous daily  glycerin Suppository - Adult 1 Suppository(s) Rectal at bedtime  guaiFENesin    Syrup 100 milliGRAM(s) Oral every 6 hours PRN  guaiFENesin  milliGRAM(s) Oral every 12 hours  HYDROcodone/homatropine Syrup 5 milliLiter(s) Oral three times a day PRN  influenza   Vaccine 0.5 milliLiter(s) IntraMuscular once  melatonin 3 milliGRAM(s) Oral at bedtime PRN  pantoprazole    Tablet 40 milliGRAM(s) Oral before breakfast  PARoxetine 30 milliGRAM(s) Oral daily  piperacillin/tazobactam IVPB. 3.375 Gram(s) IV Intermittent every 8 hours  polyethylene glycol 3350 17 Gram(s) Oral daily  senna 2 Tablet(s) Oral at bedtime  traMADol 25 milliGRAM(s) Oral every 6 hours PRN  traMADol 50 milliGRAM(s) Oral every 6 hours PRN              REVIEW OF SYSTEMS:  CONSTITUTIONAL: + fever, no weight loss, or no fatigue  NECK: No pain, no stiffness  RESPIRATORY: + cough, no wheezing, no chills, no hemoptysis, No shortness of breath  CARDIOVASCULAR: No chest pain, no palpitations, no dizziness, no leg swelling  GASTROINTESTINAL: No abdominal pain. No nausea, no vomiting, no hematemesis; No diarrhea, no constipation. No melena, no hematochezia.  GENITOURINARY: No dysuria, no frequency, no hematuria, no incontinence  NEUROLOGICAL: No headaches, no loss of strength, no numbness, no tremors  SKIN: No itching, no burning  MUSCULOSKELETAL: No joint pain, no swelling; No muscle, no back, no extremity pain  PSYCHIATRIC: No depression, no mood swings,   HEME/LYMPH: No easy bruising, no bleeding gums  ALLERY AND IMMUNOLOGIC: No hives       Consultant(s) Notes Reviewed:  [X] YES  [ ] NO    PHYSICAL EXAM:  GENERAL: NAD  HEAD:  Atraumatic, Normocephalic  EYES: EOMI, PERRLA, conjunctiva and sclera clear  ENMT: No tonsillar erythema, exudates, or enlargement; Moist mucous membranes  NECK: Supple, No JVD  NERVOUS SYSTEM:  Awake & alert  CHEST/LUNG: Clear to auscultation bilaterally; No rales, rhonchi, wheezing,  HEART: Regular rate and rhythm  ABDOMEN: Soft, Nontender, Nondistended; Bowel sounds present  EXTREMITIES:  No clubbing, cyanosis, or edema  LYMPH: No lymphadenopathy noted  SKIN: No rashes      Advanced care planning discussed with patient/family [X] YES   [ ] NO    Advanced care planning discussed with patient/family. Advanced care planning forms reviewed/discussed/completed. 20 minutes spent.

## 2019-03-01 NOTE — PROGRESS NOTE ADULT - SUBJECTIVE AND OBJECTIVE BOX
[facility], Division of Infectious Diseases  CASANDRA Dyson A. Lee  [phone]    Name: JAME CHAIDEZ  Age: 59y  Gender: Male  MRN: [id number]    Interval History--  Notes reviewed. Still with cough. Febrile overnight.  Denies pain, no SOB. No chills/rigors.     Past Medical History--  H/O pleural effusion  CIRILO (obstructive sleep apnea)  Obesity  Migraines  Pneumonia  Smoking hx  H/O ETOH abuse  Drug abuse, in remission  HLD (hyperlipidemia)  Schizophrenia  Skin lesion  Cyst      For details regarding the patient's social history, family history, and other miscellaneous elements, please refer the initial infectious diseases consultation and/or the admitting history and physical examination for this admission.    Allergies    No Known Allergies    Intolerances        Medications--  Antibiotics:  piperacillin/tazobactam IVPB. 3.375 Gram(s) IV Intermittent every 8 hours    Immunologic:  influenza   Vaccine 0.5 milliLiter(s) IntraMuscular once    Other:  acetaminophen   Tablet .. PRN  ALBUTerol/ipratropium for Nebulization  aluminum hydroxide/magnesium hydroxide/simethicone Suspension  atorvastatin  benzonatate  clonazePAM Tablet PRN  cloZAPine  docusate sodium  enoxaparin Injectable  glycerin Suppository - Adult  guaiFENesin    Syrup PRN  guaiFENesin ER  HYDROcodone/homatropine Syrup PRN  melatonin PRN  pantoprazole    Tablet  PARoxetine  polyethylene glycol 3350  senna  traMADol PRN  traMADol PRN      Review of Systems--  A 10-point review of systems was obtained.   Review of systems otherwise unchanged compared to prior visit except as previously noted.    Physical Examination--  Vital Signs: T(F): 98.1 (03-01-19 @ 04:51), Max: 102.3 (02-28-19 @ 18:08)  HR: 76 (03-01-19 @ 04:51)  BP: 106/66 (03-01-19 @ 04:51)  RR: 18 (03-01-19 @ 04:51)  SpO2: 94% (03-01-19 @ 04:51)  Wt(kg): --  General: Nontoxic-appearing Male in no acute distress.  HEENT: AT/NC. Anicteric. Conjunctiva pink and moist. Oropharynx clear. Dentition poor.  Neck: Not rigid. No sense of mass.  Nodes: None palpable.  Lungs: Absent BS RUL/RML areas. L clear.  Heart: Regular rate and rhythm. No Murmur. No rub. No gallop. No palpable thrill.  Abdomen: Bowel sounds present and normoactive. Soft. Nondistended. Nontender. No sense of mass. No organomegaly. Obese.   Extremities: No cyanosis or clubbing. No edema.   Skin: Warm. Dry. Good turgor. No rash. No vasculitic stigmata.  Psychiatric: Odd affect, appropriate mood        Laboratory Studies--  CBC                        10.5   3.95  )-----------( 158      ( 01 Mar 2019 08:56 )             30.7       Chemistries  03-01    143  |  109<H>  |  15  ----------------------------<  103<H>  3.6   |  28  |  1.30    Ca    7.9<L>      01 Mar 2019 08:56  Mg     2.4     03-01    TPro  6.0  /  Alb  2.4<L>  /  TBili  0.9  /  DBili  .40<H>  /  AST  76<H>  /  ALT  48  /  AlkPhos  84  02-28      Culture Data    Culture - Blood (collected 26 Feb 2019 02:52)  Source: .Blood Blood-Venous  Preliminary Report (27 Feb 2019 03:01):    No growth to date.    Culture - Blood (collected 26 Feb 2019 02:52)  Source: .Blood Blood-Venous  Preliminary Report (27 Feb 2019 03:01):    No growth to date.

## 2019-03-01 NOTE — CONSULT NOTE ADULT - SUBJECTIVE AND OBJECTIVE BOX
[facility] Cardiology Consult - Karen Dinh SupriyasStephy [phone]    CHIEF COMPLAINT: Patient is a 59y old  Male who presents with a chief complaint of cough (01 Mar 2019 07:04). No chest discomfort and denies any leg discomfort, swelling.      HPI:  Pt is a 60 yo male who presents to the ED with a cc of flu-like illness.  PMHx of h/o prior drug abuse currently in remission, h/o ETOH abuse currently in remission, h/o pleural effusion, HLD, migraine, obesity, pneumonia, Schizophrenia.  Pt reports that for the last several days he has not been feeling well.  He reports fevers T max of 103.5 with associated chills, SOB, and cough productive of yellow sputum.  Pt reports that symptoms have been worsening and so he followed up with his PMD today and was told to come to the ED for concern for possible pneumonia.  Pt denies N/V/D/C, CP, ext numbness or weakness.  He reports that he did receive an influenza vaccine this year. Also c/o abdominal pain. (26 Feb 2019 11:23)      PAST MEDICAL & SURGICAL HISTORY:  H/O pleural effusion: DX IN 2011 when pt had pneumonia  chronic condition now  Obesity  Migraines  Pneumonia: 2011  Smoking hx  H/O ETOH abuse: sober x 20 yrs  Drug abuse, in remission: sober x 20 yrs  HLD (hyperlipidemia)  Schizophrenia: pt never stated he had schizophrenia during interview but is on medication for it.  Skin lesion: face   &quot;pre-cancerous&quot;  Cyst: scalp excised 20 yrs ago      SOCIAL HISTORY: Alochol: Denied  Smoking: Nonsmoker  Drug Use: Denied  Marital Status:         FAMILY HISTORY: FAMILY HISTORY:  No pertinent family history in first degree relatives      MEDICATIONS  (STANDING):  ALBUTerol/ipratropium for Nebulization 3 milliLiter(s) Nebulizer every 6 hours  aluminum hydroxide/magnesium hydroxide/simethicone Suspension 30 milliLiter(s) Oral every 6 hours  atorvastatin 20 milliGRAM(s) Oral at bedtime  benzonatate 100 milliGRAM(s) Oral three times a day  cloZAPine 100 milliGRAM(s) Oral three times a day  docusate sodium 100 milliGRAM(s) Oral three times a day  enoxaparin Injectable 40 milliGRAM(s) SubCutaneous daily  glycerin Suppository - Adult 1 Suppository(s) Rectal at bedtime  guaiFENesin  milliGRAM(s) Oral every 12 hours  influenza   Vaccine 0.5 milliLiter(s) IntraMuscular once  pantoprazole    Tablet 40 milliGRAM(s) Oral before breakfast  PARoxetine 30 milliGRAM(s) Oral daily  piperacillin/tazobactam IVPB. 3.375 Gram(s) IV Intermittent every 8 hours  polyethylene glycol 3350 17 Gram(s) Oral daily  senna 2 Tablet(s) Oral at bedtime    MEDICATIONS  (PRN):  acetaminophen   Tablet .. 650 milliGRAM(s) Oral every 6 hours PRN Temp greater or equal to 38C (100.4F), Mild Pain (1 - 3)  clonazePAM Tablet 1 milliGRAM(s) Oral three times a day PRN anxiety/agitation  guaiFENesin    Syrup 100 milliGRAM(s) Oral every 6 hours PRN Cough  HYDROcodone/homatropine Syrup 5 milliLiter(s) Oral three times a day PRN severe cough  melatonin 3 milliGRAM(s) Oral at bedtime PRN Insomnia  traMADol 25 milliGRAM(s) Oral every 6 hours PRN Moderate Pain (4 - 6)  traMADol 50 milliGRAM(s) Oral every 6 hours PRN Severe Pain (7 - 10)      Allergies    No Known Allergies    Intolerances        REVIEW OF SYSTEMS:    CONSTITUTIONAL: No weakness, fevers or chills  EYES/ENT: No visual changes;  No vertigo or throat pain   NECK: No pain or stiffness  RESPIRATORY:  Pos cough, wheezing, No hemoptysis; Pos shortness of breath  CARDIOVASCULAR: No chest pain or palpitations  GASTROINTESTINAL: No abdominal pain. No nausea, vomiting, or hematemesis; No diarrhea or constipation. No melena or hematochezia.  GENITOURINARY: No dysuria, frequency or hematuria  NEUROLOGICAL: No numbness or weakness  SKIN: No itching or rash  All other review of systems is negative unless indicated above    VITAL SIGNS:   Vital Signs Last 24 Hrs  T(C): 36.7 (01 Mar 2019 04:51), Max: 39.1 (28 Feb 2019 18:08)  T(F): 98.1 (01 Mar 2019 04:51), Max: 102.3 (28 Feb 2019 18:08)  HR: 76 (01 Mar 2019 04:51) (76 - 95)  BP: 106/66 (01 Mar 2019 04:51) (101/66 - 108/70)  BP(mean): --  RR: 18 (01 Mar 2019 04:51) (16 - 18)  SpO2: 94% (01 Mar 2019 04:51) (87% - 96%)    I&O's Summary    28 Feb 2019 07:01  -  01 Mar 2019 07:00  --------------------------------------------------------  IN: 100 mL / OUT: 0 mL / NET: 100 mL        PHYSICAL EXAM:  Constitutional: Awake in NAD  HEENT:  AKIKO, EOMI  Neck: No JVD  Pulmonary: Decreased BS with mild wheezing  Cardiovascular: PMI not palpable non-displaced Regular S1 and S2, no murmurs, rubs, gallops or clicks  Gastrointestinal: Bowel Sounds present, soft, nontender.   Extremities: Trace peripheral edema.   Neuro: No gross focal deficits    LABS: All Labs Reviewed:                        10.5   3.95  )-----------( 158      ( 01 Mar 2019 08:56 )             30.7                         10.6   4.03  )-----------( 167      ( 28 Feb 2019 08:37 )             30.8                         10.5   5.48  )-----------( 156      ( 27 Feb 2019 08:19 )             30.7     28 Feb 2019 08:37    142    |  110    |  17     ----------------------------<  99     3.6     |  24     |  1.70   27 Feb 2019 08:19    143    |  112    |  19     ----------------------------<  104    3.2     |  22     |  1.60     Ca    7.6        28 Feb 2019 08:37  Ca    7.3        27 Feb 2019 08:19  Mg     2.4       28 Feb 2019 08:37  Mg     2.2       27 Feb 2019 08:19    TPro  6.0    /  Alb  2.4    /  TBili  0.9    /  DBili  .40    /  AST  76     /  ALT  48     /  AlkPhos  84     28 Feb 2019 08:37      CARDIAC MARKERS ( 28 Feb 2019 08:37 )  <.015 ng/mL / x     / 517 U/L / x     / x          Blood Culture: Organism --  Gram Stain Blood -- Gram Stain --  Specimen Source .Blood Blood-Venous  Culture-Blood --      02-28 @ 08:37  Pro Bnp [id number]  02-27 @ 18:04  Pro Bnp 1375    02-28 @ 08:37  TSH: 1.14      RADIOLOGY/EKG: [facility] Cardiology Consult - Karen Dinh Stephy Odell [phone]    CHIEF COMPLAINT: Patient is a 59y old  Male who presents with a chief complaint of cough (01 Mar 2019 07:04). No chest discomfort and denies any leg discomfort, swelling. No events on tele monitor      HPI:  Pt is a 60 yo male who presents to the ED with a cc of flu-like illness.  PMHx of h/o prior drug abuse currently in remission, h/o ETOH abuse currently in remission, h/o pleural effusion, HLD, migraine, obesity, pneumonia, Schizophrenia.  Pt reports that for the last several days he has not been feeling well.  He reports fevers T max of 103.5 with associated chills, SOB, and cough productive of yellow sputum.  Pt reports that symptoms have been worsening and so he followed up with his PMD today and was told to come to the ED for concern for possible pneumonia.  Pt denies N/V/D/C, CP, ext numbness or weakness.  He reports that he did receive an influenza vaccine this year. Also c/o abdominal pain. (26 Feb 2019 11:23)      PAST MEDICAL & SURGICAL HISTORY:  H/O pleural effusion: DX IN 2011 when pt had pneumonia  chronic condition now  Obesity  Migraines  Pneumonia: 2011  Smoking hx  H/O ETOH abuse: sober x 20 yrs  Drug abuse, in remission: sober x 20 yrs  HLD (hyperlipidemia)  Schizophrenia: pt never stated he had schizophrenia during interview but is on medication for it.  Skin lesion: face   &quot;pre-cancerous&quot;  Cyst: scalp excised 20 yrs ago      SOCIAL HISTORY: Alochol: Denied  Smoking: Nonsmoker  Drug Use: Denied  Marital Status:         FAMILY HISTORY: FAMILY HISTORY:  No pertinent family history in first degree relatives      MEDICATIONS  (STANDING):  ALBUTerol/ipratropium for Nebulization 3 milliLiter(s) Nebulizer every 6 hours  aluminum hydroxide/magnesium hydroxide/simethicone Suspension 30 milliLiter(s) Oral every 6 hours  atorvastatin 20 milliGRAM(s) Oral at bedtime  benzonatate 100 milliGRAM(s) Oral three times a day  cloZAPine 100 milliGRAM(s) Oral three times a day  docusate sodium 100 milliGRAM(s) Oral three times a day  enoxaparin Injectable 40 milliGRAM(s) SubCutaneous daily  glycerin Suppository - Adult 1 Suppository(s) Rectal at bedtime  guaiFENesin  milliGRAM(s) Oral every 12 hours  influenza   Vaccine 0.5 milliLiter(s) IntraMuscular once  pantoprazole    Tablet 40 milliGRAM(s) Oral before breakfast  PARoxetine 30 milliGRAM(s) Oral daily  piperacillin/tazobactam IVPB. 3.375 Gram(s) IV Intermittent every 8 hours  polyethylene glycol 3350 17 Gram(s) Oral daily  senna 2 Tablet(s) Oral at bedtime    MEDICATIONS  (PRN):  acetaminophen   Tablet .. 650 milliGRAM(s) Oral every 6 hours PRN Temp greater or equal to 38C (100.4F), Mild Pain (1 - 3)  clonazePAM Tablet 1 milliGRAM(s) Oral three times a day PRN anxiety/agitation  guaiFENesin    Syrup 100 milliGRAM(s) Oral every 6 hours PRN Cough  HYDROcodone/homatropine Syrup 5 milliLiter(s) Oral three times a day PRN severe cough  melatonin 3 milliGRAM(s) Oral at bedtime PRN Insomnia  traMADol 25 milliGRAM(s) Oral every 6 hours PRN Moderate Pain (4 - 6)  traMADol 50 milliGRAM(s) Oral every 6 hours PRN Severe Pain (7 - 10)      Allergies    No Known Allergies    Intolerances        REVIEW OF SYSTEMS:    CONSTITUTIONAL: No weakness, fevers or chills  EYES/ENT: No visual changes;  No vertigo or throat pain   NECK: No pain or stiffness  RESPIRATORY:  Pos cough, wheezing, No hemoptysis; Pos shortness of breath  CARDIOVASCULAR: No chest pain or palpitations  GASTROINTESTINAL: No abdominal pain. No nausea, vomiting, or hematemesis; No diarrhea or constipation. No melena or hematochezia.  GENITOURINARY: No dysuria, frequency or hematuria  NEUROLOGICAL: No numbness or weakness  SKIN: No itching or rash  All other review of systems is negative unless indicated above    VITAL SIGNS:   Vital Signs Last 24 Hrs  T(C): 36.7 (01 Mar 2019 04:51), Max: 39.1 (28 Feb 2019 18:08)  T(F): 98.1 (01 Mar 2019 04:51), Max: 102.3 (28 Feb 2019 18:08)  HR: 76 (01 Mar 2019 04:51) (76 - 95)  BP: 106/66 (01 Mar 2019 04:51) (101/66 - 108/70)  BP(mean): --  RR: 18 (01 Mar 2019 04:51) (16 - 18)  SpO2: 94% (01 Mar 2019 04:51) (87% - 96%)    I&O's Summary    28 Feb 2019 07:01  -  01 Mar 2019 07:00  --------------------------------------------------------  IN: 100 mL / OUT: 0 mL / NET: 100 mL        PHYSICAL EXAM:  Constitutional: Awake in NAD  HEENT:  AKIKO, EOMI  Neck: No JVD  Pulmonary: Decreased BS with mild wheezing  Cardiovascular: PMI not palpable non-displaced Regular S1 and S2, no murmurs, rubs, gallops or clicks  Gastrointestinal: Bowel Sounds present, soft, nontender.   Extremities: Trace peripheral edema.   Neuro: No gross focal deficits    LABS: All Labs Reviewed:                        10.5   3.95  )-----------( 158      ( 01 Mar 2019 08:56 )             30.7                         10.6   4.03  )-----------( 167      ( 28 Feb 2019 08:37 )             30.8                         10.5   5.48  )-----------( 156      ( 27 Feb 2019 08:19 )             30.7     28 Feb 2019 08:37    142    |  110    |  17     ----------------------------<  99     3.6     |  24     |  1.70   27 Feb 2019 08:19    143    |  112    |  19     ----------------------------<  104    3.2     |  22     |  1.60     Ca    7.6        28 Feb 2019 08:37  Ca    7.3        27 Feb 2019 08:19  Mg     2.4       28 Feb 2019 08:37  Mg     2.2       27 Feb 2019 08:19    TPro  6.0    /  Alb  2.4    /  TBili  0.9    /  DBili  .40    /  AST  76     /  ALT  48     /  AlkPhos  84     28 Feb 2019 08:37      CARDIAC MARKERS ( 28 Feb 2019 08:37 )  <.015 ng/mL / x     / 517 U/L / x     / x          Blood Culture: Organism --  Gram Stain Blood -- Gram Stain --  Specimen Source .Blood Blood-Venous  Culture-Blood --      02-28 @ 08:37  Pro Bnp [id number]  02-27 @ 18:04  Pro Bnp 1375    02-28 @ 08:37  TSH: 1.14      RADIOLOGY/EKG:

## 2019-03-01 NOTE — PROGRESS NOTE ADULT - SUBJECTIVE AND OBJECTIVE BOX
INTERVAL HPI/OVERNIGHT EVENTS:  fever and hypoxic case cancelled    MEDICATIONS  (STANDING):  ALBUTerol/ipratropium for Nebulization 3 milliLiter(s) Nebulizer every 6 hours  aluminum hydroxide/magnesium hydroxide/simethicone Suspension 30 milliLiter(s) Oral every 6 hours  atorvastatin 20 milliGRAM(s) Oral at bedtime  benzonatate 100 milliGRAM(s) Oral three times a day  cloZAPine 100 milliGRAM(s) Oral three times a day  docusate sodium 100 milliGRAM(s) Oral three times a day  enoxaparin Injectable 40 milliGRAM(s) SubCutaneous daily  glycerin Suppository - Adult 1 Suppository(s) Rectal at bedtime  guaiFENesin  milliGRAM(s) Oral every 12 hours  influenza   Vaccine 0.5 milliLiter(s) IntraMuscular once  pantoprazole    Tablet 40 milliGRAM(s) Oral before breakfast  PARoxetine 30 milliGRAM(s) Oral daily  piperacillin/tazobactam IVPB. 3.375 Gram(s) IV Intermittent every 8 hours  polyethylene glycol 3350 17 Gram(s) Oral daily  senna 2 Tablet(s) Oral at bedtime    MEDICATIONS  (PRN):  acetaminophen   Tablet .. 650 milliGRAM(s) Oral every 6 hours PRN Temp greater or equal to 38C (100.4F), Mild Pain (1 - 3)  clonazePAM Tablet 1 milliGRAM(s) Oral three times a day PRN anxiety/agitation  guaiFENesin    Syrup 100 milliGRAM(s) Oral every 6 hours PRN Cough  HYDROcodone/homatropine Syrup 5 milliLiter(s) Oral three times a day PRN severe cough  melatonin 3 milliGRAM(s) Oral at bedtime PRN Insomnia  traMADol 25 milliGRAM(s) Oral every 6 hours PRN Moderate Pain (4 - 6)  traMADol 50 milliGRAM(s) Oral every 6 hours PRN Severe Pain (7 - 10)      Allergies    No Known Allergies    Intolerances        Review of Systems:    General:  No wt loss, fevers, chills, night sweats,fatigue,   Eyes:  Good vision, no reported pain  ENT:  No sore throat, pain, runny nose, dysphagia  CV:  No pain, palpitatioins, hypo/hypertension  Resp:  No dyspnea, cough, tachypnea, wheezing  GI:  No pain, No nausea, No vomiting, No diarrhea, No constipatiion, No weight loss, No fever, No pruritis, No rectal bleeding, No tarry stools, No dysphagia,  :  No pain, bleeding, incontinence, nocturia  Muscle:  No pain, weakness  Neuro:  No weakness, tingling, memory problems  Psych:  No fatigue, insomnia, mood problems, depression  Endocrine:  No polyuria, polydypsia, cold/heat intolerance  Heme:  No petechiae, ecchymosis, easy bruisability  Skin:  No rash, tattoos, scars, edema      Vital Signs Last 24 Hrs  T(C): 37.2 (01 Mar 2019 13:25), Max: 39.1 (28 Feb 2019 18:08)  T(F): 98.9 (01 Mar 2019 13:25), Max: 102.3 (28 Feb 2019 18:08)  HR: 90 (01 Mar 2019 13:25) (76 - 93)  BP: 141/71 (01 Mar 2019 13:25) (105/69 - 141/71)  BP(mean): --  RR: 18 (01 Mar 2019 13:25) (18 - 18)  SpO2: 95% (01 Mar 2019 13:25) (87% - 96%)    PHYSICAL EXAM:    Constitutional: NAD, well-developed  HEENT: EOMI, throat clear  Neck: No LAD, supple  Respiratory: CTA and P  Cardiovascular: S1 and S2, RRR, no M  Gastrointestinal: BS+, soft, NT/ND, neg HSM,  Extremities: No peripheral edema, neg clubing, cyanosis  Vascular: 2+ peripheral pulses  Neurological: A/O x 3, no focal deficits  Psychiatric: Normal mood, normal affect  Skin: No rashes      LABS:                        10.5   3.95  )-----------( 158      ( 01 Mar 2019 08:56 )             30.7     03-01    143  |  109<H>  |  15  ----------------------------<  103<H>  3.6   |  28  |  1.30    Ca    7.9<L>      01 Mar 2019 08:56  Mg     2.4     03-01    TPro  6.0  /  Alb  2.4<L>  /  TBili  0.9  /  DBili  .40<H>  /  AST  76<H>  /  ALT  48  /  AlkPhos  84  02-28          RADIOLOGY & ADDITIONAL TESTS:

## 2019-03-02 LAB
ALBUMIN SERPL ELPH-MCNC: 2.3 G/DL — LOW (ref 3.3–5)
ALP SERPL-CCNC: 113 U/L — SIGNIFICANT CHANGE UP (ref 40–120)
ALT FLD-CCNC: 87 U/L — HIGH (ref 12–78)
ANION GAP SERPL CALC-SCNC: 9 MMOL/L — SIGNIFICANT CHANGE UP (ref 5–17)
AST SERPL-CCNC: 114 U/L — HIGH (ref 15–37)
BASOPHILS # BLD AUTO: 0.02 K/UL — SIGNIFICANT CHANGE UP (ref 0–0.2)
BASOPHILS NFR BLD AUTO: 0.4 % — SIGNIFICANT CHANGE UP (ref 0–2)
BILIRUB DIRECT SERPL-MCNC: 0.4 MG/DL — HIGH (ref 0.05–0.2)
BILIRUB INDIRECT FLD-MCNC: 0.5 MG/DL — SIGNIFICANT CHANGE UP (ref 0.2–1)
BILIRUB SERPL-MCNC: 0.9 MG/DL — SIGNIFICANT CHANGE UP (ref 0.2–1.2)
BUN SERPL-MCNC: 14 MG/DL — SIGNIFICANT CHANGE UP (ref 7–23)
CALCIUM SERPL-MCNC: 7.7 MG/DL — LOW (ref 8.5–10.1)
CHLORIDE SERPL-SCNC: 105 MMOL/L — SIGNIFICANT CHANGE UP (ref 96–108)
CO2 SERPL-SCNC: 26 MMOL/L — SIGNIFICANT CHANGE UP (ref 22–31)
CREAT SERPL-MCNC: 1.4 MG/DL — HIGH (ref 0.5–1.3)
EOSINOPHIL # BLD AUTO: 0.07 K/UL — SIGNIFICANT CHANGE UP (ref 0–0.5)
EOSINOPHIL NFR BLD AUTO: 1.6 % — SIGNIFICANT CHANGE UP (ref 0–6)
GLUCOSE SERPL-MCNC: 88 MG/DL — SIGNIFICANT CHANGE UP (ref 70–99)
HCT VFR BLD CALC: 30.5 % — LOW (ref 39–50)
HGB BLD-MCNC: 10.3 G/DL — LOW (ref 13–17)
IMM GRANULOCYTES NFR BLD AUTO: 4.7 % — HIGH (ref 0–1.5)
LYMPHOCYTES # BLD AUTO: 0.57 K/UL — LOW (ref 1–3.3)
LYMPHOCYTES # BLD AUTO: 12.7 % — LOW (ref 13–44)
MAGNESIUM SERPL-MCNC: 2.3 MG/DL — SIGNIFICANT CHANGE UP (ref 1.6–2.6)
MCHC RBC-ENTMCNC: 29.5 PG — SIGNIFICANT CHANGE UP (ref 27–34)
MCHC RBC-ENTMCNC: 33.8 GM/DL — SIGNIFICANT CHANGE UP (ref 32–36)
MCV RBC AUTO: 87.4 FL — SIGNIFICANT CHANGE UP (ref 80–100)
MONOCYTES # BLD AUTO: 0.6 K/UL — SIGNIFICANT CHANGE UP (ref 0–0.9)
MONOCYTES NFR BLD AUTO: 13.4 % — SIGNIFICANT CHANGE UP (ref 2–14)
NEUTROPHILS # BLD AUTO: 3.01 K/UL — SIGNIFICANT CHANGE UP (ref 1.8–7.4)
NEUTROPHILS NFR BLD AUTO: 67.2 % — SIGNIFICANT CHANGE UP (ref 43–77)
NRBC # BLD: 0 /100 WBCS — SIGNIFICANT CHANGE UP (ref 0–0)
PLATELET # BLD AUTO: 194 K/UL — SIGNIFICANT CHANGE UP (ref 150–400)
POTASSIUM SERPL-MCNC: 3.3 MMOL/L — LOW (ref 3.5–5.3)
POTASSIUM SERPL-SCNC: 3.3 MMOL/L — LOW (ref 3.5–5.3)
PROT SERPL-MCNC: 6.3 G/DL — SIGNIFICANT CHANGE UP (ref 6–8.3)
RBC # BLD: 3.49 M/UL — LOW (ref 4.2–5.8)
RBC # FLD: 13.2 % — SIGNIFICANT CHANGE UP (ref 10.3–14.5)
SODIUM SERPL-SCNC: 140 MMOL/L — SIGNIFICANT CHANGE UP (ref 135–145)
WBC # BLD: 4.48 K/UL — SIGNIFICANT CHANGE UP (ref 3.8–10.5)
WBC # FLD AUTO: 4.48 K/UL — SIGNIFICANT CHANGE UP (ref 3.8–10.5)

## 2019-03-02 PROCEDURE — 71046 X-RAY EXAM CHEST 2 VIEWS: CPT | Mod: 26

## 2019-03-02 RX ADMIN — Medication 600 MILLIGRAM(S): at 18:27

## 2019-03-02 RX ADMIN — TRAMADOL HYDROCHLORIDE 25 MILLIGRAM(S): 50 TABLET ORAL at 12:38

## 2019-03-02 RX ADMIN — Medication 100 MILLIGRAM(S): at 21:55

## 2019-03-02 RX ADMIN — PIPERACILLIN AND TAZOBACTAM 25 GRAM(S): 4; .5 INJECTION, POWDER, LYOPHILIZED, FOR SOLUTION INTRAVENOUS at 05:27

## 2019-03-02 RX ADMIN — CLOZAPINE 100 MILLIGRAM(S): 150 TABLET, ORALLY DISINTEGRATING ORAL at 05:27

## 2019-03-02 RX ADMIN — POLYETHYLENE GLYCOL 3350 17 GRAM(S): 17 POWDER, FOR SOLUTION ORAL at 11:45

## 2019-03-02 RX ADMIN — Medication 600 MILLIGRAM(S): at 05:26

## 2019-03-02 RX ADMIN — PIPERACILLIN AND TAZOBACTAM 25 GRAM(S): 4; .5 INJECTION, POWDER, LYOPHILIZED, FOR SOLUTION INTRAVENOUS at 21:54

## 2019-03-02 RX ADMIN — Medication 3 MILLILITER(S): at 19:25

## 2019-03-02 RX ADMIN — TRAMADOL HYDROCHLORIDE 25 MILLIGRAM(S): 50 TABLET ORAL at 18:27

## 2019-03-02 RX ADMIN — CLOZAPINE 100 MILLIGRAM(S): 150 TABLET, ORALLY DISINTEGRATING ORAL at 14:26

## 2019-03-02 RX ADMIN — TRAMADOL HYDROCHLORIDE 25 MILLIGRAM(S): 50 TABLET ORAL at 11:45

## 2019-03-02 RX ADMIN — Medication 30 MILLIGRAM(S): at 11:46

## 2019-03-02 RX ADMIN — Medication 3 MILLILITER(S): at 08:00

## 2019-03-02 RX ADMIN — PIPERACILLIN AND TAZOBACTAM 25 GRAM(S): 4; .5 INJECTION, POWDER, LYOPHILIZED, FOR SOLUTION INTRAVENOUS at 14:26

## 2019-03-02 RX ADMIN — Medication 1 SUPPOSITORY(S): at 21:54

## 2019-03-02 RX ADMIN — SENNA PLUS 2 TABLET(S): 8.6 TABLET ORAL at 21:54

## 2019-03-02 RX ADMIN — Medication 100 MILLIGRAM(S): at 05:26

## 2019-03-02 RX ADMIN — PANTOPRAZOLE SODIUM 40 MILLIGRAM(S): 20 TABLET, DELAYED RELEASE ORAL at 05:52

## 2019-03-02 RX ADMIN — ATORVASTATIN CALCIUM 20 MILLIGRAM(S): 80 TABLET, FILM COATED ORAL at 21:55

## 2019-03-02 RX ADMIN — CLOZAPINE 100 MILLIGRAM(S): 150 TABLET, ORALLY DISINTEGRATING ORAL at 21:55

## 2019-03-02 RX ADMIN — Medication 100 MILLIGRAM(S): at 21:54

## 2019-03-02 RX ADMIN — Medication 100 MILLIGRAM(S): at 11:45

## 2019-03-02 RX ADMIN — TRAMADOL HYDROCHLORIDE 25 MILLIGRAM(S): 50 TABLET ORAL at 19:53

## 2019-03-02 NOTE — PROGRESS NOTE ADULT - SUBJECTIVE AND OBJECTIVE BOX
Date/Time Patient Seen:  		  Referring MD:   Data Reviewed	       Patient is a 59y old  Male who presents with a chief complaint of cough (01 Mar 2019 14:01)      Subjective/HPI     PAST MEDICAL & SURGICAL HISTORY:  H/O pleural effusion: DX IN 2011 when pt had pneumonia  chronic condition now  CIRILO (obstructive sleep apnea)  Obesity  Migraines  Pneumonia: 2011  Smoking hx  H/O ETOH abuse: sober x 20 yrs  Drug abuse, in remission: sober x 20 yrs  HLD (hyperlipidemia)  Schizophrenia: pt never stated he had schizophrenia during interview but is on medication for it.  Skin lesion: face   &quot;pre-cancerous&quot;  Cyst: scalp excised 20 yrs ago        Medication list         MEDICATIONS  (STANDING):  ALBUTerol/ipratropium for Nebulization 3 milliLiter(s) Nebulizer every 6 hours  aluminum hydroxide/magnesium hydroxide/simethicone Suspension 30 milliLiter(s) Oral every 6 hours  atorvastatin 20 milliGRAM(s) Oral at bedtime  benzonatate 100 milliGRAM(s) Oral three times a day  cloZAPine 100 milliGRAM(s) Oral three times a day  docusate sodium 100 milliGRAM(s) Oral three times a day  enoxaparin Injectable 40 milliGRAM(s) SubCutaneous daily  glycerin Suppository - Adult 1 Suppository(s) Rectal at bedtime  guaiFENesin  milliGRAM(s) Oral every 12 hours  influenza   Vaccine 0.5 milliLiter(s) IntraMuscular once  pantoprazole    Tablet 40 milliGRAM(s) Oral before breakfast  PARoxetine 30 milliGRAM(s) Oral daily  piperacillin/tazobactam IVPB. 3.375 Gram(s) IV Intermittent every 8 hours  polyethylene glycol 3350 17 Gram(s) Oral daily  senna 2 Tablet(s) Oral at bedtime    MEDICATIONS  (PRN):  acetaminophen   Tablet .. 650 milliGRAM(s) Oral every 6 hours PRN Temp greater or equal to 38C (100.4F), Mild Pain (1 - 3)  clonazePAM Tablet 1 milliGRAM(s) Oral three times a day PRN anxiety/agitation  guaiFENesin    Syrup 100 milliGRAM(s) Oral every 6 hours PRN Cough  HYDROcodone/homatropine Syrup 5 milliLiter(s) Oral three times a day PRN severe cough  melatonin 3 milliGRAM(s) Oral at bedtime PRN Insomnia  traMADol 25 milliGRAM(s) Oral every 6 hours PRN Moderate Pain (4 - 6)  traMADol 50 milliGRAM(s) Oral every 6 hours PRN Severe Pain (7 - 10)         Vitals log        ICU Vital Signs Last 24 Hrs  T(C): 37.3 (02 Mar 2019 05:01), Max: 38.9 (01 Mar 2019 12:23)  T(F): 99.2 (02 Mar 2019 05:01), Max: 102 (01 Mar 2019 12:23)  HR: 86 (02 Mar 2019 05:01) (84 - 93)  BP: 112/73 (02 Mar 2019 05:01) (103/59 - 141/71)  BP(mean): --  ABP: --  ABP(mean): --  RR: 18 (02 Mar 2019 05:01) (18 - 18)  SpO2: 92% (02 Mar 2019 05:01) (92% - 96%)           Input and Output:  I&O's Detail      Lab Data                        10.5   3.95  )-----------( 158      ( 01 Mar 2019 08:56 )             30.7     03-01    143  |  109<H>  |  15  ----------------------------<  103<H>  3.6   |  28  |  1.30    Ca    7.9<L>      01 Mar 2019 08:56  Mg     2.4     03-01    TPro  6.0  /  Alb  2.4<L>  /  TBili  0.9  /  DBili  .40<H>  /  AST  76<H>  /  ALT  48  /  AlkPhos  84  02-28      CARDIAC MARKERS ( 28 Feb 2019 08:37 )  <.015 ng/mL / x     / 517 U/L / x     / x            Review of Systems	      Objective     Physical Examination    heart s1s2  lung dec BS    Pertinent Lab findings & Imaging      Navarrete:  NO   Adequate UO     I&O's Detail           Discussed with:     Cultures:	        Radiology

## 2019-03-02 NOTE — PROGRESS NOTE ADULT - SUBJECTIVE AND OBJECTIVE BOX
[facility] Progress Note [phone] (Dr. Dinh, Karen, Jose R, Stephy)    CHIEF COMPLAINT: Patient is a 59y old  Male who presents with a chief complaint of cough (02 Mar 2019 07:10)      Follow Up Today: The patient denies any chest discomfort or shortness of breath.    HPI:  Pt is a 60 yo male who presents to the ED with a cc of flu-like illness.  PMHx of h/o prior drug abuse currently in remission, h/o ETOH abuse currently in remission, h/o pleural effusion, HLD, migraine, obesity, pneumonia, Schizophrenia.  Pt reports that for the last several days he has not been feeling well.  He reports fevers T max of 103.5 with associated chills, SOB, and cough productive of yellow sputum.  Pt reports that symptoms have been worsening and so he followed up with his PMD today and was told to come to the ED for concern for possible pneumonia.  Pt denies N/V/D/C, CP, ext numbness or weakness.  He reports that he did receive an influenza vaccine this year. Also c/o abdominal pain. (26 Feb 2019 11:23)      PAST MEDICAL & SURGICAL HISTORY:  H/O pleural effusion: DX IN 2011 when pt had pneumonia  chronic condition now  Obesity  Migraines  Pneumonia: 2011  Smoking hx  H/O ETOH abuse: sober x 20 yrs  Drug abuse, in remission: sober x 20 yrs  HLD (hyperlipidemia)  Schizophrenia: pt never stated he had schizophrenia during interview but is on medication for it.  Skin lesion: face   &quot;pre-cancerous&quot;  Cyst: scalp excised 20 yrs ago      MEDICATIONS  (STANDING):  ALBUTerol/ipratropium for Nebulization 3 milliLiter(s) Nebulizer every 6 hours  aluminum hydroxide/magnesium hydroxide/simethicone Suspension 30 milliLiter(s) Oral every 6 hours  atorvastatin 20 milliGRAM(s) Oral at bedtime  benzonatate 100 milliGRAM(s) Oral three times a day  cloZAPine 100 milliGRAM(s) Oral three times a day  docusate sodium 100 milliGRAM(s) Oral three times a day  enoxaparin Injectable 40 milliGRAM(s) SubCutaneous daily  glycerin Suppository - Adult 1 Suppository(s) Rectal at bedtime  guaiFENesin  milliGRAM(s) Oral every 12 hours  influenza   Vaccine 0.5 milliLiter(s) IntraMuscular once  pantoprazole    Tablet 40 milliGRAM(s) Oral before breakfast  PARoxetine 30 milliGRAM(s) Oral daily  piperacillin/tazobactam IVPB. 3.375 Gram(s) IV Intermittent every 8 hours  polyethylene glycol 3350 17 Gram(s) Oral daily  senna 2 Tablet(s) Oral at bedtime    MEDICATIONS  (PRN):  acetaminophen   Tablet .. 650 milliGRAM(s) Oral every 6 hours PRN Temp greater or equal to 38C (100.4F), Mild Pain (1 - 3)  clonazePAM Tablet 1 milliGRAM(s) Oral three times a day PRN anxiety/agitation  guaiFENesin    Syrup 100 milliGRAM(s) Oral every 6 hours PRN Cough  HYDROcodone/homatropine Syrup 5 milliLiter(s) Oral three times a day PRN severe cough  melatonin 3 milliGRAM(s) Oral at bedtime PRN Insomnia  traMADol 25 milliGRAM(s) Oral every 6 hours PRN Moderate Pain (4 - 6)  traMADol 50 milliGRAM(s) Oral every 6 hours PRN Severe Pain (7 - 10)      Allergies    No Known Allergies    Intolerances        REVIEW OF SYSTEMS:    All other review of systems is negative unless indicated above    Vital Signs Last 24 Hrs  T(C): 37.3 (02 Mar 2019 05:01), Max: 38.9 (01 Mar 2019 12:23)  T(F): 99.2 (02 Mar 2019 05:01), Max: 102 (01 Mar 2019 12:23)  HR: 86 (02 Mar 2019 05:01) (84 - 93)  BP: 112/73 (02 Mar 2019 05:01) (103/59 - 141/71)  BP(mean): --  RR: 18 (02 Mar 2019 05:01) (18 - 18)  SpO2: 92% (02 Mar 2019 05:01) (92% - 96%)    I&O's Summary      PHYSICAL EXAM:    Constitutional: NAD, awake and alert, well-developed  Eyes:  EOMI,  Pupils round, No oral cyanosis.  HEENT: No exudate or erythema  Pulmonary: Non-labored, breath sounds are clear bilaterally, No wheezing, rales or rhonchi  Cardiovascular: Regular, S1 and S2, No murmurs, rubs, gallops oir clicks  Gastrointestinal: Bowel Sounds present, soft, nontender.   Ext: No significant LE edema with good pulses x 4  Neurological: Alert, no gross focal motor deficits  Skin: No rashes.  Psych:  Mood & affect appropriate    LABS: All Labs Reviewed:                        10.5   3.95  )-----------( 158      ( 01 Mar 2019 08:56 )             30.7                         10.6   4.03  )-----------( 167      ( 28 Feb 2019 08:37 )             30.8                         10.5   5.48  )-----------( 156      ( 27 Feb 2019 08:19 )             30.7     01 Mar 2019 08:56    143    |  109    |  15     ----------------------------<  103    3.6     |  28     |  1.30   28 Feb 2019 08:37    142    |  110    |  17     ----------------------------<  99     3.6     |  24     |  1.70   27 Feb 2019 08:19    143    |  112    |  19     ----------------------------<  104    3.2     |  22     |  1.60     Ca    7.9        01 Mar 2019 08:56  Ca    7.6        28 Feb 2019 08:37  Ca    7.3        27 Feb 2019 08:19  Mg     2.4       01 Mar 2019 08:56  Mg     2.4       28 Feb 2019 08:37  Mg     2.2       27 Feb 2019 08:19    TPro  6.0    /  Alb  2.4    /  TBili  0.9    /  DBili  .40    /  AST  76     /  ALT  48     /  AlkPhos  84     28 Feb 2019 08:37      CARDIAC MARKERS ( 28 Feb 2019 08:37 )  <.015 ng/mL / x     / 517 U/L / x     / x          Blood Culture: Organism --  Gram Stain Blood -- Gram Stain --  Specimen Source .Blood Blood-Venous  Culture-Blood --      02-28 @ 08:37  Pro Bnp [id number]  02-27 @ 18:04  Pro Bnp 1375    02-28 @ 08:37  TSH: 1.14      RADIOLOGY/EKG:    Attending Attestation:   20 minutes spent on total encounter; more than 50% of the visit was spent counseling and/or coordinating care by the attending physician.     ASSESSMENT AND PLAN [facility] Progress Note [phone] (Dr. Dinh, Karen, Jose R, Stephy)    CHIEF COMPLAINT: Patient is a 59y old  Male who presents with a chief complaint of cough (02 Mar 2019 07:10)      Follow Up Today: The patient denies any chest discomfort or shortness of breath. He continues to have a cough and fevers    HPI:  Pt is a 58 yo male who presents to the ED with a cc of flu-like illness.  PMHx of h/o prior drug abuse currently in remission, h/o ETOH abuse currently in remission, h/o pleural effusion, HLD, migraine, obesity, pneumonia, Schizophrenia.  Pt reports that for the last several days he has not been feeling well.  He reports fevers T max of 103.5 with associated chills, SOB, and cough productive of yellow sputum.  Pt reports that symptoms have been worsening and so he followed up with his PMD today and was told to come to the ED for concern for possible pneumonia.  Pt denies N/V/D/C, CP, ext numbness or weakness.  He reports that he did receive an influenza vaccine this year. Also c/o abdominal pain. (26 Feb 2019 11:23)      PAST MEDICAL & SURGICAL HISTORY:  H/O pleural effusion: DX IN 2011 when pt had pneumonia  chronic condition now  Obesity  Migraines  Pneumonia: 2011  Smoking hx  H/O ETOH abuse: sober x 20 yrs  Drug abuse, in remission: sober x 20 yrs  HLD (hyperlipidemia)  Schizophrenia: pt never stated he had schizophrenia during interview but is on medication for it.  Skin lesion: face   &quot;pre-cancerous&quot;  Cyst: scalp excised 20 yrs ago      MEDICATIONS  (STANDING):  ALBUTerol/ipratropium for Nebulization 3 milliLiter(s) Nebulizer every 6 hours  aluminum hydroxide/magnesium hydroxide/simethicone Suspension 30 milliLiter(s) Oral every 6 hours  atorvastatin 20 milliGRAM(s) Oral at bedtime  benzonatate 100 milliGRAM(s) Oral three times a day  cloZAPine 100 milliGRAM(s) Oral three times a day  docusate sodium 100 milliGRAM(s) Oral three times a day  enoxaparin Injectable 40 milliGRAM(s) SubCutaneous daily  glycerin Suppository - Adult 1 Suppository(s) Rectal at bedtime  guaiFENesin  milliGRAM(s) Oral every 12 hours  influenza   Vaccine 0.5 milliLiter(s) IntraMuscular once  pantoprazole    Tablet 40 milliGRAM(s) Oral before breakfast  PARoxetine 30 milliGRAM(s) Oral daily  piperacillin/tazobactam IVPB. 3.375 Gram(s) IV Intermittent every 8 hours  polyethylene glycol 3350 17 Gram(s) Oral daily  senna 2 Tablet(s) Oral at bedtime    MEDICATIONS  (PRN):  acetaminophen   Tablet .. 650 milliGRAM(s) Oral every 6 hours PRN Temp greater or equal to 38C (100.4F), Mild Pain (1 - 3)  clonazePAM Tablet 1 milliGRAM(s) Oral three times a day PRN anxiety/agitation  guaiFENesin    Syrup 100 milliGRAM(s) Oral every 6 hours PRN Cough  HYDROcodone/homatropine Syrup 5 milliLiter(s) Oral three times a day PRN severe cough  melatonin 3 milliGRAM(s) Oral at bedtime PRN Insomnia  traMADol 25 milliGRAM(s) Oral every 6 hours PRN Moderate Pain (4 - 6)  traMADol 50 milliGRAM(s) Oral every 6 hours PRN Severe Pain (7 - 10)      Allergies    No Known Allergies    Intolerances        REVIEW OF SYSTEMS:    All other review of systems is negative unless indicated above    Vital Signs Last 24 Hrs  T(C): 37.3 (02 Mar 2019 05:01), Max: 38.9 (01 Mar 2019 12:23)  T(F): 99.2 (02 Mar 2019 05:01), Max: 102 (01 Mar 2019 12:23)  HR: 86 (02 Mar 2019 05:01) (84 - 93)  BP: 112/73 (02 Mar 2019 05:01) (103/59 - 141/71)  BP(mean): --  RR: 18 (02 Mar 2019 05:01) (18 - 18)  SpO2: 92% (02 Mar 2019 05:01) (92% - 96%)    I&O's Summary      PHYSICAL EXAM:    Constitutional: NAD, awake and alert, well-developed  Eyes:  EOMI,  Pupils round, No oral cyanosis.  HEENT: No exudate or erythema  Pulmonary: Decreased BS, No rales  Cardiovascular: Regular, S1 and S2, No murmurs  Gastrointestinal: Bowel Sounds present, soft, nontender.   Ext: No significant LE edema with good pulses x 4  Neurological: Alert, no gross focal motor deficits  Skin: No rashes.  Psych:  Mood & affect appropriate    LABS: All Labs Reviewed:                        10.5   3.95  )-----------( 158      ( 01 Mar 2019 08:56 )             30.7                         10.6   4.03  )-----------( 167      ( 28 Feb 2019 08:37 )             30.8                         10.5   5.48  )-----------( 156      ( 27 Feb 2019 08:19 )             30.7     01 Mar 2019 08:56    143    |  109    |  15     ----------------------------<  103    3.6     |  28     |  1.30   28 Feb 2019 08:37    142    |  110    |  17     ----------------------------<  99     3.6     |  24     |  1.70   27 Feb 2019 08:19    143    |  112    |  19     ----------------------------<  104    3.2     |  22     |  1.60     Ca    7.9        01 Mar 2019 08:56  Ca    7.6        28 Feb 2019 08:37  Ca    7.3        27 Feb 2019 08:19  Mg     2.4       01 Mar 2019 08:56  Mg     2.4       28 Feb 2019 08:37  Mg     2.2       27 Feb 2019 08:19    TPro  6.0    /  Alb  2.4    /  TBili  0.9    /  DBili  .40    /  AST  76     /  ALT  48     /  AlkPhos  84     28 Feb 2019 08:37      CARDIAC MARKERS ( 28 Feb 2019 08:37 )  <.015 ng/mL / x     / 517 U/L / x     / x          Blood Culture: Organism --  Gram Stain Blood -- Gram Stain --  Specimen Source .Blood Blood-Venous  Culture-Blood --      02-28 @ 08:37  Pro Bnp [id number]  02-27 @ 18:04  Pro Bnp 1375    02-28 @ 08:37  TSH: 1.14      RADIOLOGY/EKG:    Attending Attestation:   20 minutes spent on total encounter; more than 50% of the visit was spent counseling and/or coordinating care by the attending physician.     ASSESSMENT AND PLAN

## 2019-03-02 NOTE — PROGRESS NOTE ADULT - SUBJECTIVE AND OBJECTIVE BOX
Patient is a 59y old  Male who presents with a chief complaint of cough (02 Mar 2019 07:34)      INTERVAL HPI/OVERNIGHT EVENTS: Patient seen and examined. NAD. No complaints. Feeling better.    Vital Signs Last 24 Hrs  T(C): 36.7 (02 Mar 2019 11:57), Max: 38.9 (01 Mar 2019 12:23)  T(F): 98 (02 Mar 2019 11:57), Max: 102 (01 Mar 2019 12:23)  HR: 81 (02 Mar 2019 11:57) (81 - 90)  BP: 112/73 (02 Mar 2019 05:01) (103/59 - 141/71)  BP(mean): --  RR: 18 (02 Mar 2019 05:01) (18 - 18)  SpO2: 90% (02 Mar 2019 11:57) (90% - 98%)    03-02    140  |  105  |  14  ----------------------------<  88  3.3<L>   |  26  |  1.40<H>    Ca    7.7<L>      02 Mar 2019 09:26  Mg     2.3     03-02    TPro  6.3  /  Alb  2.3<L>  /  TBili  0.9  /  DBili  .40<H>  /  AST  114<H>  /  ALT  87<H>  /  AlkPhos  113  03-02                          10.3   4.48  )-----------( 194      ( 02 Mar 2019 09:26 )             30.5       CAPILLARY BLOOD GLUCOSE                  acetaminophen   Tablet .. 650 milliGRAM(s) Oral every 6 hours PRN  ALBUTerol/ipratropium for Nebulization 3 milliLiter(s) Nebulizer every 6 hours  aluminum hydroxide/magnesium hydroxide/simethicone Suspension 30 milliLiter(s) Oral every 6 hours  atorvastatin 20 milliGRAM(s) Oral at bedtime  benzonatate 100 milliGRAM(s) Oral three times a day  clonazePAM Tablet 1 milliGRAM(s) Oral three times a day PRN  cloZAPine 100 milliGRAM(s) Oral three times a day  docusate sodium 100 milliGRAM(s) Oral three times a day  enoxaparin Injectable 40 milliGRAM(s) SubCutaneous daily  glycerin Suppository - Adult 1 Suppository(s) Rectal at bedtime  guaiFENesin    Syrup 100 milliGRAM(s) Oral every 6 hours PRN  guaiFENesin  milliGRAM(s) Oral every 12 hours  HYDROcodone/homatropine Syrup 5 milliLiter(s) Oral three times a day PRN  influenza   Vaccine 0.5 milliLiter(s) IntraMuscular once  melatonin 3 milliGRAM(s) Oral at bedtime PRN  pantoprazole    Tablet 40 milliGRAM(s) Oral before breakfast  PARoxetine 30 milliGRAM(s) Oral daily  piperacillin/tazobactam IVPB. 3.375 Gram(s) IV Intermittent every 8 hours  polyethylene glycol 3350 17 Gram(s) Oral daily  senna 2 Tablet(s) Oral at bedtime  traMADol 25 milliGRAM(s) Oral every 6 hours PRN  traMADol 50 milliGRAM(s) Oral every 6 hours PRN              REVIEW OF SYSTEMS:  CONSTITUTIONAL: + fever, no weight loss, or no fatigue  NECK: No pain, no stiffness  RESPIRATORY: No cough, no wheezing, no chills, no hemoptysis, No shortness of breath  CARDIOVASCULAR: No chest pain, no palpitations, no dizziness, no leg swelling  GASTROINTESTINAL: No abdominal pain. No nausea, no vomiting, no hematemesis; No diarrhea, no constipation. No melena, no hematochezia.  GENITOURINARY: No dysuria, no frequency, no hematuria, no incontinence  NEUROLOGICAL: No headaches, no loss of strength, no numbness, no tremors  SKIN: No itching, no burning  MUSCULOSKELETAL: No joint pain, no swelling; No muscle, no back, no extremity pain  PSYCHIATRIC: No depression, no mood swings,   HEME/LYMPH: No easy bruising, no bleeding gums  ALLERY AND IMMUNOLOGIC: No hives       Consultant(s) Notes Reviewed:  [X] YES  [ ] NO    PHYSICAL EXAM:  GENERAL: NAD  HEAD:  Atraumatic, Normocephalic  EYES: EOMI, PERRLA, conjunctiva and sclera clear  ENMT: No tonsillar erythema, exudates, or enlargement; Moist mucous membranes  NECK: Supple, No JVD  NERVOUS SYSTEM:  Awake & alert  CHEST/LUNG: Clear to auscultation bilaterally; No rales, rhonchi, wheezing,  HEART: Regular rate and rhythm  ABDOMEN: Soft, Nontender, Nondistended; Bowel sounds present  EXTREMITIES:  No clubbing, cyanosis, or edema  LYMPH: No lymphadenopathy noted  SKIN: No rashes      Advanced care planning discussed with patient/family [X] YES   [ ] NO    Advanced care planning discussed with patient/family. Advanced care planning forms reviewed/discussed/completed. 20 minutes spent.

## 2019-03-02 NOTE — PROGRESS NOTE ADULT - ASSESSMENT
59M with h/o smoking, Etoh abuse, pleural effusion and hyperlipidemia for endoscopy.    Suggest:    1. Cardiac optimized for EGD  2. Continue with statin for hyperlipidemia  3. Advised to not resume cigarette smoking. 59M with h/o smoking, Etoh abuse, pleural effusion and hyperlipidemia for endoscopy.    Suggest:    1. Cardiac optimized for EGD  2. Continue with Lipitor for hyperlipidemia  3. Advised to not resume cigarette smoking.  4. Abx per pulm  5. DVT prophylaxis.    6. Follow Up CXR

## 2019-03-02 NOTE — PROGRESS NOTE ADULT - SUBJECTIVE AND OBJECTIVE BOX
[facility], Division of Infectious Diseases  CASANDRA Dyson A. Lee  [phone]]    Name: JAME CHAIDEZ  Age: 59y  Gender: Male  MRN: [id number]    Interval History--  Notes reviewed  still with episodes of cough, but no dyspnea.  feels ok.  no fever today    Past Medical History--  H/O pleural effusion  CIRILO (obstructive sleep apnea)  Obesity  Migraines  Pneumonia  Smoking hx  H/O ETOH abuse  Drug abuse, in remission  HLD (hyperlipidemia)  Schizophrenia  Skin lesion  Cyst      For details regarding the patient's social history, family history, and other miscellaneous elements, please refer the initial infectious diseases consultation and/or the admitting history and physical examination for this admission.    Allergies    No Known Allergies    Intolerances        Medications--  Antibiotics:  piperacillin/tazobactam IVPB. 3.375 Gram(s) IV Intermittent every 8 hours    Immunologic:  influenza   Vaccine 0.5 milliLiter(s) IntraMuscular once    Other:  acetaminophen   Tablet .. PRN  ALBUTerol/ipratropium for Nebulization  aluminum hydroxide/magnesium hydroxide/simethicone Suspension  atorvastatin  benzonatate  clonazePAM Tablet PRN  cloZAPine  docusate sodium  enoxaparin Injectable  glycerin Suppository - Adult  guaiFENesin    Syrup PRN  guaiFENesin ER  HYDROcodone/homatropine Syrup PRN  melatonin PRN  pantoprazole    Tablet  PARoxetine  polyethylene glycol 3350  senna  traMADol PRN  traMADol PRN      Review of Systems--  A 10-point review of systems was obtained.     Pertinent positives and negatives--  Constitutional: No fevers. No Chills. No Rigors.   Cardiovascular: No chest pain. No palpitations.  Respiratory: No shortness of breath. + cough.  Gastrointestinal: No nausea or vomiting. No diarrhea or constipation.   Psychiatric: + anxiety    Review of systems otherwise negative except as previously noted.    Physical Examination--  Vital Signs: T(F): 97.6 (03-02-19 @ 12:30), Max: 99.2 (03-02-19 @ 05:01)  HR: 82 (03-02-19 @ 12:30)  BP: 107/67 (03-02-19 @ 12:30)  RR: 17 (03-02-19 @ 12:30)  SpO2: 92% (03-02-19 @ 12:30)  Wt(kg): --  General: Nontoxic-appearing Male in no acute distress.  HEENT: AT/NC.. Anicteric. Conjunctiva pink and moist. Oropharynx clear. Dentition poor.  Neck: Not rigid. No sense of mass.  Nodes: None palpable.  Lungs: Clear bilaterally without rales, wheezing or rhonchi  Heart: Regular rate and rhythm. No Murmur. No rub. No gallop. No palpable thrill.  Abdomen: Bowel sounds present and normoactive. Soft. Nondistended. Nontender.   Extremities: No cyanosis or clubbing. No edema.   Skin: Warm. Dry. Good turgor. No rash. No vasculitic stigmata.  Psychiatric: Appropriate affect and mood for situation.         Laboratory Studies--  CBC                        10.3   4.48  )-----------( 194      ( 02 Mar 2019 09:26 )             30.5       Chemistries  03-02    140  |  105  |  14  ----------------------------<  88  3.3<L>   |  26  |  1.40<H>    Ca    7.7<L>      02 Mar 2019 09:26  Mg     2.3     03-02    TPro  6.3  /  Alb  2.3<L>  /  TBili  0.9  /  DBili  .40<H>  /  AST  114<H>  /  ALT  87<H>  /  AlkPhos  113  03-02      Culture Data    Culture - Blood (collected 26 Feb 2019 02:52)  Source: .Blood Blood-Venous  Preliminary Report (27 Feb 2019 03:01):    No growth to date.    Culture - Blood (collected 26 Feb 2019 02:52)  Source: .Blood Blood-Venous  Preliminary Report (27 Feb 2019 03:01):    No growth to date.      < from: Xray Chest 2 Views PA/Lat (03.02.19 @ 07:55) >  EXAM:  XR CHEST PA LAT 2V                            PROCEDURE DATE:  03/02/2019          INTERPRETATION:    Clinical History: Shortness of breath follow-up pneumonia.    Comparison: 2/27/2019.    Findings.      There is persistent right upper lobeairspace consolidation.  There are low lung volumes resulting in crowding of the bronchovascular  markings at the lung bases. Probable small bibasilar effusion. Cardiac   and mediastinal structures are stable. Thoracic spondylosis.    Impression:    Persistent right upper lobe infiltrate follow-up to resolution is   suggested.    < end of copied text >

## 2019-03-02 NOTE — PROGRESS NOTE ADULT - SUBJECTIVE AND OBJECTIVE BOX
INTERVAL HPI/OVERNIGHT EVENTS:  No new overnight event.  No N/V/D.  Tolerating diet.  no abd pain    Allergies    No Known Allergies    Intolerances    General:  No wt loss, fevers, chills, night sweats, fatigue,   Eyes:  Good vision, no reported pain  ENT:  No sore throat, pain, runny nose, dysphagia  CV:  No pain, palpitations, hypo/hypertension  Resp:  No dyspnea, cough, tachypnea, wheezing  GI:  No pain, No nausea, No vomiting, No diarrhea, No constipation, No weight loss, No fever, No pruritis, No rectal bleeding, No tarry stools, No dysphagia,  :  No pain, bleeding, incontinence, nocturia  Muscle:  No pain, weakness  Neuro:  No weakness, tingling, memory problems  Psych:  No fatigue, insomnia, mood problems, depression  Endocrine:  No polyuria, polydipsia, cold/heat intolerance  Heme:  No petechiae, ecchymosis, easy bruisability  Skin:  No rash, tattoos, scars, edema      PHYSICAL EXAM:   Vital Signs:  Vital Signs Last 24 Hrs  T(C): 36.4 (02 Mar 2019 12:30), Max: 38.3 (01 Mar 2019 19:00)  T(F): 97.6 (02 Mar 2019 12:30), Max: 101 (01 Mar 2019 19:00)  HR: 82 (02 Mar 2019 12:30) (81 - 87)  BP: 107/67 (02 Mar 2019 12:30) (103/59 - 112/73)  BP(mean): --  RR: 17 (02 Mar 2019 12:30) (17 - 18)  SpO2: 92% (02 Mar 2019 12:30) (90% - 98%)  Daily     Daily I&O's Summary      GENERAL:  Appears stated age, well-groomed, well-nourished, no distress  HEENT:  NC/AT,  conjunctivae clear and pink, no thyromegaly, nodules, adenopathy, no JVD, sclera -anicteric  CHEST:  Full & symmetric excursion, no increased effort, breath sounds clear  HEART:  Regular rhythm, S1, S2, no murmur/rub/S3/S4, no abdominal bruit, no edema  ABDOMEN:  Soft, non-tender, non-distended, normoactive bowel sounds,  no masses ,no hepato-splenomegaly, no signs of chronic liver disease  EXTEREMITIES:  no cyanosis,clubbing or edema  SKIN:  No rash/erythema/ecchymoses/petechiae/wounds/abscess/warm/dry  NEURO:  Alert, oriented, no asterixis, no tremor, no encephalopathy      LABS:                        10.3   4.48  )-----------( 194      ( 02 Mar 2019 09:26 )             30.5     03-02    140  |  105  |  14  ----------------------------<  88  3.3<L>   |  26  |  1.40<H>    Ca    7.7<L>      02 Mar 2019 09:26  Mg     2.3     03-02    TPro  6.3  /  Alb  2.3<L>  /  TBili  0.9  /  DBili  .40<H>  /  AST  114<H>  /  ALT  87<H>  /  AlkPhos  113  03-02        amylase   lipaseLipase, Serum: 45 U/L (02-25 @ 19:38)    RADIOLOGY & ADDITIONAL TESTS:

## 2019-03-03 LAB
CULTURE RESULTS: SIGNIFICANT CHANGE UP
CULTURE RESULTS: SIGNIFICANT CHANGE UP
SPECIMEN SOURCE: SIGNIFICANT CHANGE UP
SPECIMEN SOURCE: SIGNIFICANT CHANGE UP

## 2019-03-03 RX ADMIN — Medication 100 MILLIGRAM(S): at 14:12

## 2019-03-03 RX ADMIN — Medication 30 MILLIGRAM(S): at 14:09

## 2019-03-03 RX ADMIN — CLOZAPINE 100 MILLIGRAM(S): 150 TABLET, ORALLY DISINTEGRATING ORAL at 21:52

## 2019-03-03 RX ADMIN — Medication 100 MILLIGRAM(S): at 14:08

## 2019-03-03 RX ADMIN — CLOZAPINE 100 MILLIGRAM(S): 150 TABLET, ORALLY DISINTEGRATING ORAL at 05:38

## 2019-03-03 RX ADMIN — Medication 600 MILLIGRAM(S): at 18:13

## 2019-03-03 RX ADMIN — PANTOPRAZOLE SODIUM 40 MILLIGRAM(S): 20 TABLET, DELAYED RELEASE ORAL at 05:38

## 2019-03-03 RX ADMIN — Medication 650 MILLIGRAM(S): at 21:52

## 2019-03-03 RX ADMIN — Medication 650 MILLIGRAM(S): at 22:52

## 2019-03-03 RX ADMIN — Medication 100 MILLIGRAM(S): at 05:38

## 2019-03-03 RX ADMIN — Medication 100 MILLIGRAM(S): at 21:52

## 2019-03-03 RX ADMIN — TRAMADOL HYDROCHLORIDE 25 MILLIGRAM(S): 50 TABLET ORAL at 14:12

## 2019-03-03 RX ADMIN — Medication 600 MILLIGRAM(S): at 06:00

## 2019-03-03 RX ADMIN — TRAMADOL HYDROCHLORIDE 25 MILLIGRAM(S): 50 TABLET ORAL at 15:00

## 2019-03-03 RX ADMIN — CLOZAPINE 100 MILLIGRAM(S): 150 TABLET, ORALLY DISINTEGRATING ORAL at 14:09

## 2019-03-03 RX ADMIN — PIPERACILLIN AND TAZOBACTAM 25 GRAM(S): 4; .5 INJECTION, POWDER, LYOPHILIZED, FOR SOLUTION INTRAVENOUS at 14:12

## 2019-03-03 RX ADMIN — ATORVASTATIN CALCIUM 20 MILLIGRAM(S): 80 TABLET, FILM COATED ORAL at 21:52

## 2019-03-03 RX ADMIN — PIPERACILLIN AND TAZOBACTAM 25 GRAM(S): 4; .5 INJECTION, POWDER, LYOPHILIZED, FOR SOLUTION INTRAVENOUS at 05:38

## 2019-03-03 RX ADMIN — PIPERACILLIN AND TAZOBACTAM 25 GRAM(S): 4; .5 INJECTION, POWDER, LYOPHILIZED, FOR SOLUTION INTRAVENOUS at 21:53

## 2019-03-03 RX ADMIN — SENNA PLUS 2 TABLET(S): 8.6 TABLET ORAL at 21:52

## 2019-03-03 RX ADMIN — POLYETHYLENE GLYCOL 3350 17 GRAM(S): 17 POWDER, FOR SOLUTION ORAL at 14:09

## 2019-03-03 NOTE — PROGRESS NOTE ADULT - ASSESSMENT
59M with h/o smoking, Etoh abuse, pleural effusion and hyperlipidemia for endoscopy.      < from: TTE Echo Doppler w/o Cont (03.02.19 @ 10:59) >  Conclusion:   1. This a very technically limited study.  2. There is normal left ventricular size and systolic function with an   ejection fraction of 60%.  3. Views of endocardial definition are limited on this study which   inhibits the interpretation of wall motion abnormalities.  4. There is mild concentric left ventricular hypertrophy.  5. The aortic root is mildly dilated at 3.9 cm.  6. There is trivial tricuspid regurgitation.  7. There is trivial regurgitation.  8. There is mitral annular calcification.  9. There is a normal diastolic flow pattern.  10. There is normal right atrial and right ventricular size and systolic   function.  11. There is no gross pericardial effusion.    Blood Pressure 112/73 mmHg         BSA 2.22 sq m    Dimensions:    LA 3.6 cm       Normal Values: 2.0 - 4.0 cm    Ao 3.9 cm        Normal Values: 2.0 - 3.8 cm  IVSd 1.3 cm      Normal Values: 0.6 - 1.2 cm  PWd 1.1 cm       Normal Values: 0.6 - 1.1 cm  LVIDd 4.8 cm         Normal Values: 3.0 - 5.6 cm  LVIDs 3.9 cm         Normal Values: 1.8 - 4.0 cm    < end of copied text >    < from: Xray Chest 2 Views PA/Lat (03.02.19 @ 07:55) >    Findings.      There is persistent right upper lobeairspace consolidation.  There are low lung volumes resulting in crowding of the bronchovascular  markings at the lung bases. Probable small bibasilar effusion. Cardiac   and mediastinal structures are stable. Thoracic spondylosis.    Impression:    Persistent right upper lobe infiltrate follow-up to resolution is   suggested.    < end of copied text >      Suggest:    1. Cardiac optimized for EGD  2. Continue with Lipitor for hyperlipidemia  3. Advised to not resume cigarette smoking.  4. Abx per pulm  5. DVT prophylaxis.    6. 59M with h/o smoking, Etoh abuse, pleural effusion and hyperlipidemia for endoscopy.      < from: TTE Echo Doppler w/o Cont (03.02.19 @ 10:59) >  Conclusion:   1. This a very technically limited study.  2. There is normal left ventricular size and systolic function with an   ejection fraction of 60%.  3. Views of endocardial definition are limited on this study which   inhibits the interpretation of wall motion abnormalities.  4. There is mild concentric left ventricular hypertrophy.  5. The aortic root is mildly dilated at 3.9 cm.  6. There is trivial tricuspid regurgitation.  7. There is trivial regurgitation.  8. There is mitral annular calcification.  9. There is a normal diastolic flow pattern.  10. There is normal right atrial and right ventricular size and systolic   function.  11. There is no gross pericardial effusion.    Blood Pressure 112/73 mmHg         BSA 2.22 sq m    Dimensions:    LA 3.6 cm       Normal Values: 2.0 - 4.0 cm    Ao 3.9 cm        Normal Values: 2.0 - 3.8 cm  IVSd 1.3 cm      Normal Values: 0.6 - 1.2 cm  PWd 1.1 cm       Normal Values: 0.6 - 1.1 cm  LVIDd 4.8 cm         Normal Values: 3.0 - 5.6 cm  LVIDs 3.9 cm         Normal Values: 1.8 - 4.0 cm    < end of copied text >    < from: Xray Chest 2 Views PA/Lat (03.02.19 @ 07:55) >    Findings.      There is persistent right upper lobeairspace consolidation.  There are low lung volumes resulting in crowding of the bronchovascular  markings at the lung bases. Probable small bibasilar effusion. Cardiac   and mediastinal structures are stable. Thoracic spondylosis.    Impression:    Persistent right upper lobe infiltrate follow-up to resolution is   suggested.    < end of copied text >      Suggest:    1. Cardiac optimized for EGD  2. Continue with Lipitor for hyperlipidemia  3. Advised to not resume cigarette smoking.  4. Abx per pulm  5. DVT prophylaxis.    6. Will follow

## 2019-03-03 NOTE — PROGRESS NOTE ADULT - SUBJECTIVE AND OBJECTIVE BOX
Date/Time Patient Seen:  		  Referring MD:   Data Reviewed	       Patient is a 59y old  Male who presents with a chief complaint of cough (02 Mar 2019 19:03)      Subjective/HPI     PAST MEDICAL & SURGICAL HISTORY:  H/O pleural effusion: DX IN 2011 when pt had pneumonia  chronic condition now  CIRILO (obstructive sleep apnea)  Obesity  Migraines  Pneumonia: 2011  Smoking hx  H/O ETOH abuse: sober x 20 yrs  Drug abuse, in remission: sober x 20 yrs  HLD (hyperlipidemia)  Schizophrenia: pt never stated he had schizophrenia during interview but is on medication for it.  Skin lesion: face   &quot;pre-cancerous&quot;  Cyst: scalp excised 20 yrs ago        Medication list         MEDICATIONS  (STANDING):  ALBUTerol/ipratropium for Nebulization 3 milliLiter(s) Nebulizer every 6 hours  aluminum hydroxide/magnesium hydroxide/simethicone Suspension 30 milliLiter(s) Oral every 6 hours  atorvastatin 20 milliGRAM(s) Oral at bedtime  benzonatate 100 milliGRAM(s) Oral three times a day  cloZAPine 100 milliGRAM(s) Oral three times a day  docusate sodium 100 milliGRAM(s) Oral three times a day  enoxaparin Injectable 40 milliGRAM(s) SubCutaneous daily  glycerin Suppository - Adult 1 Suppository(s) Rectal at bedtime  guaiFENesin  milliGRAM(s) Oral every 12 hours  influenza   Vaccine 0.5 milliLiter(s) IntraMuscular once  pantoprazole    Tablet 40 milliGRAM(s) Oral before breakfast  PARoxetine 30 milliGRAM(s) Oral daily  piperacillin/tazobactam IVPB. 3.375 Gram(s) IV Intermittent every 8 hours  polyethylene glycol 3350 17 Gram(s) Oral daily  senna 2 Tablet(s) Oral at bedtime    MEDICATIONS  (PRN):  acetaminophen   Tablet .. 650 milliGRAM(s) Oral every 6 hours PRN Temp greater or equal to 38C (100.4F), Mild Pain (1 - 3)  clonazePAM Tablet 1 milliGRAM(s) Oral three times a day PRN anxiety/agitation  guaiFENesin    Syrup 100 milliGRAM(s) Oral every 6 hours PRN Cough  HYDROcodone/homatropine Syrup 5 milliLiter(s) Oral three times a day PRN severe cough  melatonin 3 milliGRAM(s) Oral at bedtime PRN Insomnia  traMADol 25 milliGRAM(s) Oral every 6 hours PRN Moderate Pain (4 - 6)  traMADol 50 milliGRAM(s) Oral every 6 hours PRN Severe Pain (7 - 10)         Vitals log        ICU Vital Signs Last 24 Hrs  T(C): 36.3 (03 Mar 2019 05:16), Max: 36.7 (02 Mar 2019 11:57)  T(F): 97.4 (03 Mar 2019 05:16), Max: 98.1 (02 Mar 2019 21:18)  HR: 72 (03 Mar 2019 05:16) (72 - 92)  BP: 113/77 (03 Mar 2019 05:16) (107/67 - 113/77)  BP(mean): 83 (02 Mar 2019 21:18) (83 - 83)  ABP: --  ABP(mean): --  RR: 16 (03 Mar 2019 05:16) (16 - 18)  SpO2: 91% (03 Mar 2019 05:16) (90% - 98%)           Input and Output:  I&O's Detail      Lab Data                        10.3   4.48  )-----------( 194      ( 02 Mar 2019 09:26 )             30.5     03-02    140  |  105  |  14  ----------------------------<  88  3.3<L>   |  26  |  1.40<H>    Ca    7.7<L>      02 Mar 2019 09:26  Mg     2.3     03-02    TPro  6.3  /  Alb  2.3<L>  /  TBili  0.9  /  DBili  .40<H>  /  AST  114<H>  /  ALT  87<H>  /  AlkPhos  113  03-02            Review of Systems	      Objective     Physical Examination    heart s1s2  lung dec BS  abd soft  on o2 support  obese      Pertinent Lab findings & Imaging      Navarrete:  NO   Adequate UO     I&O's Detail           Discussed with:     Cultures:	        Radiology

## 2019-03-03 NOTE — PROGRESS NOTE ADULT - SUBJECTIVE AND OBJECTIVE BOX
INTERVAL HPI/OVERNIGHT EVENTS:  No new overnight event.  No N/V/D.  Tolerating diet.  no pain now    Allergies    No Known Allergies    Intolerances          General:  No wt loss, fevers, chills, night sweats, fatigue,   Eyes:  Good vision, no reported pain  ENT:  No sore throat, pain, runny nose, dysphagia  CV:  No pain, palpitations, hypo/hypertension  Resp:  No dyspnea, cough, tachypnea, wheezing  GI:  No pain, No nausea, No vomiting, No diarrhea, No constipation, No weight loss, No fever, No pruritis, No rectal bleeding, No tarry stools, No dysphagia,  :  No pain, bleeding, incontinence, nocturia  Muscle:  No pain, weakness  Neuro:  No weakness, tingling, memory problems  Psych:  No fatigue, insomnia, mood problems, depression  Endocrine:  No polyuria, polydipsia, cold/heat intolerance  Heme:  No petechiae, ecchymosis, easy bruisability  Skin:  No rash, tattoos, scars, edema      PHYSICAL EXAM:   Vital Signs:  Vital Signs Last 24 Hrs  T(C): 36.3 (03 Mar 2019 05:16), Max: 36.7 (02 Mar 2019 21:18)  T(F): 97.4 (03 Mar 2019 05:16), Max: 98.1 (02 Mar 2019 21:18)  HR: 72 (03 Mar 2019 05:16) (72 - 92)  BP: 113/77 (03 Mar 2019 05:16) (107/67 - 113/77)  BP(mean): 83 (02 Mar 2019 21:18) (83 - 83)  RR: 16 (03 Mar 2019 05:16) (16 - 18)  SpO2: 91% (03 Mar 2019 05:16) (91% - 94%)  Daily     Daily I&O's Summary      GENERAL:  Appears stated age, well-groomed, well-nourished, no distress  HEENT:  NC/AT,  conjunctivae clear and pink, no thyromegaly, nodules, adenopathy, no JVD, sclera -anicteric  CHEST:  Full & symmetric excursion, no increased effort, breath sounds clear  HEART:  Regular rhythm, S1, S2, no murmur/rub/S3/S4, no abdominal bruit, no edema  ABDOMEN:  Soft, non-tender, non-distended, normoactive bowel sounds,  no masses ,no hepato-splenomegaly, no signs of chronic liver disease  EXTEREMITIES:  no cyanosis,clubbing or edema  SKIN:  No rash/erythema/ecchymoses/petechiae/wounds/abscess/warm/dry  NEURO:  Alert, oriented, no asterixis, no tremor, no encephalopathy      LABS:                        10.3   4.48  )-----------( 194      ( 02 Mar 2019 09:26 )             30.5     03-02    140  |  105  |  14  ----------------------------<  88  3.3<L>   |  26  |  1.40<H>    Ca    7.7<L>      02 Mar 2019 09:26  Mg     2.3     03-02    TPro  6.3  /  Alb  2.3<L>  /  TBili  0.9  /  DBili  .40<H>  /  AST  114<H>  /  ALT  87<H>  /  AlkPhos  113  03-02        amylase   lipase  RADIOLOGY & ADDITIONAL TESTS:

## 2019-03-03 NOTE — PROGRESS NOTE ADULT - SUBJECTIVE AND OBJECTIVE BOX
Patient is a 59y old  Male who presents with a chief complaint of cough (03 Mar 2019 07:54)      INTERVAL HPI/OVERNIGHT EVENTS: Patient seen and examined. NAD. No complaints.    Vital Signs Last 24 Hrs  T(C): 36.3 (03 Mar 2019 05:16), Max: 36.7 (02 Mar 2019 11:57)  T(F): 97.4 (03 Mar 2019 05:16), Max: 98.1 (02 Mar 2019 21:18)  HR: 72 (03 Mar 2019 05:16) (72 - 92)  BP: 113/77 (03 Mar 2019 05:16) (107/67 - 113/77)  BP(mean): 83 (02 Mar 2019 21:18) (83 - 83)  RR: 16 (03 Mar 2019 05:16) (16 - 18)  SpO2: 91% (03 Mar 2019 05:16) (90% - 94%)    03-02    140  |  105  |  14  ----------------------------<  88  3.3<L>   |  26  |  1.40<H>    Ca    7.7<L>      02 Mar 2019 09:26  Mg     2.3     03-02    TPro  6.3  /  Alb  2.3<L>  /  TBili  0.9  /  DBili  .40<H>  /  AST  114<H>  /  ALT  87<H>  /  AlkPhos  113  03-02                          10.3   4.48  )-----------( 194      ( 02 Mar 2019 09:26 )             30.5       CAPILLARY BLOOD GLUCOSE                  acetaminophen   Tablet .. 650 milliGRAM(s) Oral every 6 hours PRN  ALBUTerol/ipratropium for Nebulization 3 milliLiter(s) Nebulizer every 6 hours  aluminum hydroxide/magnesium hydroxide/simethicone Suspension 30 milliLiter(s) Oral every 6 hours  atorvastatin 20 milliGRAM(s) Oral at bedtime  benzonatate 100 milliGRAM(s) Oral three times a day  clonazePAM Tablet 1 milliGRAM(s) Oral three times a day PRN  cloZAPine 100 milliGRAM(s) Oral three times a day  docusate sodium 100 milliGRAM(s) Oral three times a day  enoxaparin Injectable 40 milliGRAM(s) SubCutaneous daily  glycerin Suppository - Adult 1 Suppository(s) Rectal at bedtime  guaiFENesin    Syrup 100 milliGRAM(s) Oral every 6 hours PRN  guaiFENesin  milliGRAM(s) Oral every 12 hours  HYDROcodone/homatropine Syrup 5 milliLiter(s) Oral three times a day PRN  influenza   Vaccine 0.5 milliLiter(s) IntraMuscular once  melatonin 3 milliGRAM(s) Oral at bedtime PRN  pantoprazole    Tablet 40 milliGRAM(s) Oral before breakfast  PARoxetine 30 milliGRAM(s) Oral daily  piperacillin/tazobactam IVPB. 3.375 Gram(s) IV Intermittent every 8 hours  polyethylene glycol 3350 17 Gram(s) Oral daily  senna 2 Tablet(s) Oral at bedtime  traMADol 25 milliGRAM(s) Oral every 6 hours PRN  traMADol 50 milliGRAM(s) Oral every 6 hours PRN              REVIEW OF SYSTEMS:  CONSTITUTIONAL: No fever, no weight loss, or no fatigue  NECK: No pain, no stiffness  RESPIRATORY: No cough, no wheezing, no chills, no hemoptysis, No shortness of breath  CARDIOVASCULAR: No chest pain, no palpitations, no dizziness, no leg swelling  GASTROINTESTINAL: No abdominal pain. No nausea, no vomiting, no hematemesis; No diarrhea, no constipation. No melena, no hematochezia.  GENITOURINARY: No dysuria, no frequency, no hematuria, no incontinence  NEUROLOGICAL: No headaches, no loss of strength, no numbness, no tremors  SKIN: No itching, no burning  MUSCULOSKELETAL: No joint pain, no swelling; No muscle, no back, no extremity pain  PSYCHIATRIC: No depression, no mood swings,   HEME/LYMPH: No easy bruising, no bleeding gums  ALLERY AND IMMUNOLOGIC: No hives       Consultant(s) Notes Reviewed:  [X] YES  [ ] NO    PHYSICAL EXAM:  GENERAL: NAD  HEAD:  Atraumatic, Normocephalic  EYES: EOMI, PERRLA, conjunctiva and sclera clear  ENMT: No tonsillar erythema, exudates, or enlargement; Moist mucous membranes  NECK: Supple, No JVD  NERVOUS SYSTEM:  Awake & alert  CHEST/LUNG: Clear to auscultation bilaterally; No rales, rhonchi, wheezing,  HEART: Regular rate and rhythm  ABDOMEN: Soft, Nontender, Nondistended; Bowel sounds present  EXTREMITIES:  No clubbing, cyanosis, or edema  LYMPH: No lymphadenopathy noted  SKIN: No rashes      Advanced care planning discussed with patient/family [X] YES   [ ] NO    Advanced care planning discussed with patient/family. Advanced care planning forms reviewed/discussed/completed. 20 minutes spent.

## 2019-03-03 NOTE — PROGRESS NOTE ADULT - SUBJECTIVE AND OBJECTIVE BOX
[facility] Cardiology Progress Note [phone] (Dr. Dinh, Karen, Jose R, Stephy)    CHIEF COMPLAINT: Patient is a 59y old  Male who presents with a chief complaint of cough (03 Mar 2019 06:46)      Follow Up Today: The patient denies any chest discomfort or shortness of breath.    HPI:  Pt is a 60 yo male who presents to the ED with a cc of flu-like illness.  PMHx of h/o prior drug abuse currently in remission, h/o ETOH abuse currently in remission, h/o pleural effusion, HLD, migraine, obesity, pneumonia, Schizophrenia.  Pt reports that for the last several days he has not been feeling well.  He reports fevers T max of 103.5 with associated chills, SOB, and cough productive of yellow sputum.  Pt reports that symptoms have been worsening and so he followed up with his PMD today and was told to come to the ED for concern for possible pneumonia.  Pt denies N/V/D/C, CP, ext numbness or weakness.  He reports that he did receive an influenza vaccine this year. Also c/o abdominal pain. (26 Feb 2019 11:23)      PAST MEDICAL & SURGICAL HISTORY:  H/O pleural effusion: DX IN 2011 when pt had pneumonia  chronic condition now  Obesity  Migraines  Pneumonia: 2011  Smoking hx  H/O ETOH abuse: sober x 20 yrs  Drug abuse, in remission: sober x 20 yrs  HLD (hyperlipidemia)  Schizophrenia: pt never stated he had schizophrenia during interview but is on medication for it.  Skin lesion: face   &quot;pre-cancerous&quot;  Cyst: scalp excised 20 yrs ago      MEDICATIONS  (STANDING):  ALBUTerol/ipratropium for Nebulization 3 milliLiter(s) Nebulizer every 6 hours  aluminum hydroxide/magnesium hydroxide/simethicone Suspension 30 milliLiter(s) Oral every 6 hours  atorvastatin 20 milliGRAM(s) Oral at bedtime  benzonatate 100 milliGRAM(s) Oral three times a day  cloZAPine 100 milliGRAM(s) Oral three times a day  docusate sodium 100 milliGRAM(s) Oral three times a day  enoxaparin Injectable 40 milliGRAM(s) SubCutaneous daily  glycerin Suppository - Adult 1 Suppository(s) Rectal at bedtime  guaiFENesin  milliGRAM(s) Oral every 12 hours  influenza   Vaccine 0.5 milliLiter(s) IntraMuscular once  pantoprazole    Tablet 40 milliGRAM(s) Oral before breakfast  PARoxetine 30 milliGRAM(s) Oral daily  piperacillin/tazobactam IVPB. 3.375 Gram(s) IV Intermittent every 8 hours  polyethylene glycol 3350 17 Gram(s) Oral daily  senna 2 Tablet(s) Oral at bedtime    MEDICATIONS  (PRN):  acetaminophen   Tablet .. 650 milliGRAM(s) Oral every 6 hours PRN Temp greater or equal to 38C (100.4F), Mild Pain (1 - 3)  clonazePAM Tablet 1 milliGRAM(s) Oral three times a day PRN anxiety/agitation  guaiFENesin    Syrup 100 milliGRAM(s) Oral every 6 hours PRN Cough  HYDROcodone/homatropine Syrup 5 milliLiter(s) Oral three times a day PRN severe cough  melatonin 3 milliGRAM(s) Oral at bedtime PRN Insomnia  traMADol 25 milliGRAM(s) Oral every 6 hours PRN Moderate Pain (4 - 6)  traMADol 50 milliGRAM(s) Oral every 6 hours PRN Severe Pain (7 - 10)      Allergies    No Known Allergies    Intolerances        REVIEW OF SYSTEMS:    All other review of systems is negative unless indicated above    Vital Signs Last 24 Hrs  T(C): 36.3 (03 Mar 2019 05:16), Max: 36.7 (02 Mar 2019 11:57)  T(F): 97.4 (03 Mar 2019 05:16), Max: 98.1 (02 Mar 2019 21:18)  HR: 72 (03 Mar 2019 05:16) (72 - 92)  BP: 113/77 (03 Mar 2019 05:16) (107/67 - 113/77)  BP(mean): 83 (02 Mar 2019 21:18) (83 - 83)  RR: 16 (03 Mar 2019 05:16) (16 - 18)  SpO2: 91% (03 Mar 2019 05:16) (90% - 98%)    I&O's Summary      PHYSICAL EXAM:    Constitutional: NAD, awake and alert, well-developed  Eyes:  EOMI,  Pupils round, No oral cyanosis.  HEENT: No exudate or erythema  Pulmonary: Decreased BS  Cardiovascular: Regular, S1 and S2, No murmurs  Gastrointestinal: Bowel Sounds present, soft, nontender.   Ext: No significant LE edema   Neurological: Alert, no gross focal motor deficits  Skin: No rashes.  Psych:  Mood & affect appropriate    LABS: All Labs Reviewed:                        10.3   4.48  )-----------( 194      ( 02 Mar 2019 09:26 )             30.5                         10.5   3.95  )-----------( 158      ( 01 Mar 2019 08:56 )             30.7                         10.6   4.03  )-----------( 167      ( 28 Feb 2019 08:37 )             30.8     02 Mar 2019 09:26    140    |  105    |  14     ----------------------------<  88     3.3     |  26     |  1.40   01 Mar 2019 08:56    143    |  109    |  15     ----------------------------<  103    3.6     |  28     |  1.30   28 Feb 2019 08:37    142    |  110    |  17     ----------------------------<  99     3.6     |  24     |  1.70     Ca    7.7        02 Mar 2019 09:26  Ca    7.9        01 Mar 2019 08:56  Ca    7.6        28 Feb 2019 08:37  Mg     2.3       02 Mar 2019 09:26  Mg     2.4       01 Mar 2019 08:56  Mg     2.4       28 Feb 2019 08:37    TPro  6.3    /  Alb  2.3    /  TBili  0.9    /  DBili  .40    /  AST  114    /  ALT  87     /  AlkPhos  113    02 Mar 2019 09:26  TPro  6.0    /  Alb  2.4    /  TBili  0.9    /  DBili  .40    /  AST  76     /  ALT  48     /  AlkPhos  84     28 Feb 2019 08:37          Blood Culture: Organism --  Gram Stain Blood -- Gram Stain --  Specimen Source .Blood Blood  Culture-Blood --    Organism --  Gram Stain Blood -- Gram Stain --  Specimen Source .Blood Blood  Culture-Blood --      02-28 @ 08:37  Pro Bnp [id number]    02-28 @ 08:37  TSH: 1.14      RADIOLOGY/EKG:    Attending Attestation:   20 minutes spent on total encounter; more than 50% of the visit was spent counseling and/or coordinating care by the attending physician.     ASSESSMENT AND PLAN [facility] Progress Note [phone] (Dr. Dinh, Karen, Jose R, Stephy)    CHIEF COMPLAINT: Patient is a 59y old  Male who presents with a chief complaint of cough (03 Mar 2019 06:46)      Follow Up Today: The patient denies any chest discomfort but still with some shortness of breath  HPI:  Pt is a 60 yo male who presents to the ED with a cc of flu-like illness.  PMHx of h/o prior drug abuse currently in remission, h/o ETOH abuse currently in remission, h/o pleural effusion, HLD, migraine, obesity, pneumonia, Schizophrenia.  Pt reports that for the last several days he has not been feeling well.  He reports fevers T max of 103.5 with associated chills, SOB, and cough productive of yellow sputum.  Pt reports that symptoms have been worsening and so he followed up with his PMD today and was told to come to the ED for concern for possible pneumonia.  Pt denies N/V/D/C, CP, ext numbness or weakness.  He reports that he did receive an influenza vaccine this year. Also c/o abdominal pain. (26 Feb 2019 11:23)      PAST MEDICAL & SURGICAL HISTORY:  H/O pleural effusion: DX IN 2011 when pt had pneumonia  chronic condition now  Obesity  Migraines  Pneumonia: 2011  Smoking hx  H/O ETOH abuse: sober x 20 yrs  Drug abuse, in remission: sober x 20 yrs  HLD (hyperlipidemia)  Schizophrenia: pt never stated he had schizophrenia during interview but is on medication for it.  Skin lesion: face   &quot;pre-cancerous&quot;  Cyst: scalp excised 20 yrs ago      MEDICATIONS  (STANDING):  ALBUTerol/ipratropium for Nebulization 3 milliLiter(s) Nebulizer every 6 hours  aluminum hydroxide/magnesium hydroxide/simethicone Suspension 30 milliLiter(s) Oral every 6 hours  atorvastatin 20 milliGRAM(s) Oral at bedtime  benzonatate 100 milliGRAM(s) Oral three times a day  cloZAPine 100 milliGRAM(s) Oral three times a day  docusate sodium 100 milliGRAM(s) Oral three times a day  enoxaparin Injectable 40 milliGRAM(s) SubCutaneous daily  glycerin Suppository - Adult 1 Suppository(s) Rectal at bedtime  guaiFENesin  milliGRAM(s) Oral every 12 hours  influenza   Vaccine 0.5 milliLiter(s) IntraMuscular once  pantoprazole    Tablet 40 milliGRAM(s) Oral before breakfast  PARoxetine 30 milliGRAM(s) Oral daily  piperacillin/tazobactam IVPB. 3.375 Gram(s) IV Intermittent every 8 hours  polyethylene glycol 3350 17 Gram(s) Oral daily  senna 2 Tablet(s) Oral at bedtime    MEDICATIONS  (PRN):  acetaminophen   Tablet .. 650 milliGRAM(s) Oral every 6 hours PRN Temp greater or equal to 38C (100.4F), Mild Pain (1 - 3)  clonazePAM Tablet 1 milliGRAM(s) Oral three times a day PRN anxiety/agitation  guaiFENesin    Syrup 100 milliGRAM(s) Oral every 6 hours PRN Cough  HYDROcodone/homatropine Syrup 5 milliLiter(s) Oral three times a day PRN severe cough  melatonin 3 milliGRAM(s) Oral at bedtime PRN Insomnia  traMADol 25 milliGRAM(s) Oral every 6 hours PRN Moderate Pain (4 - 6)  traMADol 50 milliGRAM(s) Oral every 6 hours PRN Severe Pain (7 - 10)      Allergies    No Known Allergies    Intolerances        REVIEW OF SYSTEMS:    All other review of systems is negative unless indicated above    Vital Signs Last 24 Hrs  T(C): 36.3 (03 Mar 2019 05:16), Max: 36.7 (02 Mar 2019 11:57)  T(F): 97.4 (03 Mar 2019 05:16), Max: 98.1 (02 Mar 2019 21:18)  HR: 72 (03 Mar 2019 05:16) (72 - 92)  BP: 113/77 (03 Mar 2019 05:16) (107/67 - 113/77)  BP(mean): 83 (02 Mar 2019 21:18) (83 - 83)  RR: 16 (03 Mar 2019 05:16) (16 - 18)  SpO2: 91% (03 Mar 2019 05:16) (90% - 98%)    I&O's Summary      PHYSICAL EXAM:    Constitutional: NAD, awake and alert, well-developed  Eyes:  EOMI,  Pupils round, No oral cyanosis.  HEENT: No exudate or erythema  Pulmonary: Decreased BS  Cardiovascular: Regular, S1 and S2, No murmurs  Gastrointestinal: Bowel Sounds present, soft, nontender.   Ext: No significant LE edema   Neurological: Alert, no gross focal motor deficits  Skin: No rashes.  Psych:  Mood & affect appropriate    LABS: All Labs Reviewed:                        10.3   4.48  )-----------( 194      ( 02 Mar 2019 09:26 )             30.5                         10.5   3.95  )-----------( 158      ( 01 Mar 2019 08:56 )             30.7                         10.6   4.03  )-----------( 167      ( 28 Feb 2019 08:37 )             30.8     02 Mar 2019 09:26    140    |  105    |  14     ----------------------------<  88     3.3     |  26     |  1.40   01 Mar 2019 08:56    143    |  109    |  15     ----------------------------<  103    3.6     |  28     |  1.30   28 Feb 2019 08:37    142    |  110    |  17     ----------------------------<  99     3.6     |  24     |  1.70     Ca    7.7        02 Mar 2019 09:26  Ca    7.9        01 Mar 2019 08:56  Ca    7.6        28 Feb 2019 08:37  Mg     2.3       02 Mar 2019 09:26  Mg     2.4       01 Mar 2019 08:56  Mg     2.4       28 Feb 2019 08:37    TPro  6.3    /  Alb  2.3    /  TBili  0.9    /  DBili  .40    /  AST  114    /  ALT  87     /  AlkPhos  113    02 Mar 2019 09:26  TPro  6.0    /  Alb  2.4    /  TBili  0.9    /  DBili  .40    /  AST  76     /  ALT  48     /  AlkPhos  84     28 Feb 2019 08:37          Blood Culture: Organism --  Gram Stain Blood -- Gram Stain --  Specimen Source .Blood Blood  Culture-Blood --    Organism --  Gram Stain Blood -- Gram Stain --  Specimen Source .Blood Blood  Culture-Blood --      02-28 @ 08:37  Pro Bnp [id number]    02-28 @ 08:37  TSH: 1.14      RADIOLOGY/EKG:    Attending Attestation:   20 minutes spent on total encounter; more than 50% of the visit was spent counseling and/or coordinating care by the attending physician.     ASSESSMENT AND PLAN

## 2019-03-04 ENCOUNTER — TRANSCRIPTION ENCOUNTER (OUTPATIENT)
Age: 59
End: 2019-03-04

## 2019-03-04 LAB
ANION GAP SERPL CALC-SCNC: 8 MMOL/L — SIGNIFICANT CHANGE UP (ref 5–17)
BUN SERPL-MCNC: 13 MG/DL — SIGNIFICANT CHANGE UP (ref 7–23)
CALCIUM SERPL-MCNC: 8.1 MG/DL — LOW (ref 8.5–10.1)
CHLORIDE SERPL-SCNC: 106 MMOL/L — SIGNIFICANT CHANGE UP (ref 96–108)
CO2 SERPL-SCNC: 30 MMOL/L — SIGNIFICANT CHANGE UP (ref 22–31)
CREAT SERPL-MCNC: 1.2 MG/DL — SIGNIFICANT CHANGE UP (ref 0.5–1.3)
FLU A RESULT: SIGNIFICANT CHANGE UP
FLU A RESULT: SIGNIFICANT CHANGE UP
FLUAV AG NPH QL: SIGNIFICANT CHANGE UP
FLUBV AG NPH QL: SIGNIFICANT CHANGE UP
GLUCOSE SERPL-MCNC: 95 MG/DL — SIGNIFICANT CHANGE UP (ref 70–99)
HCT VFR BLD CALC: 32.1 % — LOW (ref 39–50)
HGB BLD-MCNC: 11.1 G/DL — LOW (ref 13–17)
MCHC RBC-ENTMCNC: 30.2 PG — SIGNIFICANT CHANGE UP (ref 27–34)
MCHC RBC-ENTMCNC: 34.6 GM/DL — SIGNIFICANT CHANGE UP (ref 32–36)
MCV RBC AUTO: 87.2 FL — SIGNIFICANT CHANGE UP (ref 80–100)
NRBC # BLD: 0 /100 WBCS — SIGNIFICANT CHANGE UP (ref 0–0)
PLATELET # BLD AUTO: 280 K/UL — SIGNIFICANT CHANGE UP (ref 150–400)
POTASSIUM SERPL-MCNC: 3.4 MMOL/L — LOW (ref 3.5–5.3)
POTASSIUM SERPL-SCNC: 3.4 MMOL/L — LOW (ref 3.5–5.3)
RAPID RVP RESULT: SIGNIFICANT CHANGE UP
RBC # BLD: 3.68 M/UL — LOW (ref 4.2–5.8)
RBC # FLD: 13 % — SIGNIFICANT CHANGE UP (ref 10.3–14.5)
RSV RESULT: SIGNIFICANT CHANGE UP
RSV RNA RESP QL NAA+PROBE: SIGNIFICANT CHANGE UP
SODIUM SERPL-SCNC: 144 MMOL/L — SIGNIFICANT CHANGE UP (ref 135–145)
WBC # BLD: 5.25 K/UL — SIGNIFICANT CHANGE UP (ref 3.8–10.5)
WBC # FLD AUTO: 5.25 K/UL — SIGNIFICANT CHANGE UP (ref 3.8–10.5)

## 2019-03-04 RX ADMIN — Medication 100 MILLIGRAM(S): at 05:53

## 2019-03-04 RX ADMIN — Medication 100 MILLIGRAM(S): at 21:44

## 2019-03-04 RX ADMIN — Medication 3 MILLILITER(S): at 19:56

## 2019-03-04 RX ADMIN — POLYETHYLENE GLYCOL 3350 17 GRAM(S): 17 POWDER, FOR SOLUTION ORAL at 12:53

## 2019-03-04 RX ADMIN — SENNA PLUS 2 TABLET(S): 8.6 TABLET ORAL at 21:44

## 2019-03-04 RX ADMIN — CLOZAPINE 100 MILLIGRAM(S): 150 TABLET, ORALLY DISINTEGRATING ORAL at 14:43

## 2019-03-04 RX ADMIN — PANTOPRAZOLE SODIUM 40 MILLIGRAM(S): 20 TABLET, DELAYED RELEASE ORAL at 05:53

## 2019-03-04 RX ADMIN — Medication 600 MILLIGRAM(S): at 05:52

## 2019-03-04 RX ADMIN — PIPERACILLIN AND TAZOBACTAM 25 GRAM(S): 4; .5 INJECTION, POWDER, LYOPHILIZED, FOR SOLUTION INTRAVENOUS at 21:44

## 2019-03-04 RX ADMIN — Medication 30 MILLIGRAM(S): at 12:53

## 2019-03-04 RX ADMIN — PIPERACILLIN AND TAZOBACTAM 25 GRAM(S): 4; .5 INJECTION, POWDER, LYOPHILIZED, FOR SOLUTION INTRAVENOUS at 05:53

## 2019-03-04 RX ADMIN — PIPERACILLIN AND TAZOBACTAM 25 GRAM(S): 4; .5 INJECTION, POWDER, LYOPHILIZED, FOR SOLUTION INTRAVENOUS at 14:43

## 2019-03-04 RX ADMIN — Medication 100 MILLIGRAM(S): at 05:52

## 2019-03-04 RX ADMIN — Medication 600 MILLIGRAM(S): at 17:32

## 2019-03-04 RX ADMIN — ATORVASTATIN CALCIUM 20 MILLIGRAM(S): 80 TABLET, FILM COATED ORAL at 21:44

## 2019-03-04 RX ADMIN — CLOZAPINE 100 MILLIGRAM(S): 150 TABLET, ORALLY DISINTEGRATING ORAL at 05:52

## 2019-03-04 RX ADMIN — CLOZAPINE 100 MILLIGRAM(S): 150 TABLET, ORALLY DISINTEGRATING ORAL at 21:44

## 2019-03-04 RX ADMIN — Medication 100 MILLIGRAM(S): at 14:43

## 2019-03-04 RX ADMIN — Medication 3 MILLILITER(S): at 14:06

## 2019-03-04 NOTE — DISCHARGE NOTE PROVIDER - HOSPITAL COURSE
Pt is a 60 yo male who presents to the ED with a cc of flu-like illness.  PMHx of h/o prior drug abuse currently in remission, h/o ETOH abuse currently in remission, h/o pleural effusion, HLD, migraine, obesity, pneumonia, Schizophrenia.  Pt reports that for the last several days he has not been feeling well.  He reports fevers T max of 103.5 with associated chills, SOB, and cough productive of yellow sputum.  Pt reports that symptoms have been worsening and so he followed up with his PMD today and was told to come to the ED for concern for possible pneumonia.  Pt denies N/V/D/C, CP, ext numbness or weakness.  He reports that he did receive an influenza vaccine this year. Also c/o abdominal pain.        Treated with iv abx for PNA    Culture negative. Pt is a 58 yo male who presents to the ED with a cc of flu-like illness.  PMHx of h/o prior drug abuse currently in remission, h/o ETOH abuse currently in remission, h/o pleural effusion, HLD, migraine, obesity, pneumonia, Schizophrenia.  Pt reports that for the last several days he has not been feeling well.  He reports fevers T max of 103.5 with associated chills, SOB, and cough productive of yellow sputum.  Pt reports that symptoms have been worsening and so he followed up with his PMD today and was told to come to the ED for concern for possible pneumonia.  Pt denies N/V/D/C, CP, ext numbness or weakness.  He reports that he did receive an influenza vaccine this year. Also c/o abdominal pain.        Sepsis ruled in and was present on admission    Suspect aspiration pneumonia    Treated with iv abx for PNA    Culture negative.

## 2019-03-04 NOTE — PROGRESS NOTE ADULT - SUBJECTIVE AND OBJECTIVE BOX
Patient is a 59y old  Male who presents with a chief complaint of cough (04 Mar 2019 09:41)      INTERVAL HPI/OVERNIGHT EVENTS: Patient seen and examined. NAD. No complaints.    Vital Signs Last 24 Hrs  T(C): 36.8 (04 Mar 2019 05:08), Max: 38.2 (03 Mar 2019 21:50)  T(F): 98.2 (04 Mar 2019 05:08), Max: 100.7 (03 Mar 2019 21:50)  HR: 72 (04 Mar 2019 05:08) (71 - 82)  BP: 99/64 (04 Mar 2019 05:08) (99/64 - 118/77)  BP(mean): 91 (03 Mar 2019 21:50) (91 - 91)  RR: 18 (04 Mar 2019 05:08) (16 - 18)  SpO2: 91% (04 Mar 2019 05:08) (91% - 94%)    03-03    144  |  106  |  13  ----------------------------<  95  3.4<L>   |  30  |  1.20    Ca    8.1<L>      03 Mar 2019 23:58                            11.1   5.25  )-----------( 280      ( 04 Mar 2019 07:13 )             32.1       CAPILLARY BLOOD GLUCOSE                  acetaminophen   Tablet .. 650 milliGRAM(s) Oral every 6 hours PRN  ALBUTerol/ipratropium for Nebulization 3 milliLiter(s) Nebulizer every 6 hours  aluminum hydroxide/magnesium hydroxide/simethicone Suspension 30 milliLiter(s) Oral every 6 hours  atorvastatin 20 milliGRAM(s) Oral at bedtime  benzonatate 100 milliGRAM(s) Oral three times a day  clonazePAM Tablet 1 milliGRAM(s) Oral three times a day PRN  cloZAPine 100 milliGRAM(s) Oral three times a day  docusate sodium 100 milliGRAM(s) Oral three times a day  enoxaparin Injectable 40 milliGRAM(s) SubCutaneous daily  glycerin Suppository - Adult 1 Suppository(s) Rectal at bedtime  guaiFENesin    Syrup 100 milliGRAM(s) Oral every 6 hours PRN  guaiFENesin  milliGRAM(s) Oral every 12 hours  HYDROcodone/homatropine Syrup 5 milliLiter(s) Oral three times a day PRN  influenza   Vaccine 0.5 milliLiter(s) IntraMuscular once  melatonin 3 milliGRAM(s) Oral at bedtime PRN  pantoprazole    Tablet 40 milliGRAM(s) Oral before breakfast  PARoxetine 30 milliGRAM(s) Oral daily  piperacillin/tazobactam IVPB. 3.375 Gram(s) IV Intermittent every 8 hours  polyethylene glycol 3350 17 Gram(s) Oral daily  senna 2 Tablet(s) Oral at bedtime  traMADol 25 milliGRAM(s) Oral every 6 hours PRN  traMADol 50 milliGRAM(s) Oral every 6 hours PRN              REVIEW OF SYSTEMS:  CONSTITUTIONAL: + fever, no weight loss, or no fatigue  NECK: No pain, no stiffness  RESPIRATORY: No cough, no wheezing, no chills, no hemoptysis, No shortness of breath  CARDIOVASCULAR: No chest pain, no palpitations, no dizziness, no leg swelling  GASTROINTESTINAL: No abdominal pain. No nausea, no vomiting, no hematemesis; No diarrhea, no constipation. No melena, no hematochezia.  GENITOURINARY: No dysuria, no frequency, no hematuria, no incontinence  NEUROLOGICAL: No headaches, no loss of strength, no numbness, no tremors  SKIN: No itching, no burning  MUSCULOSKELETAL: No joint pain, no swelling; No muscle, no back, no extremity pain  PSYCHIATRIC: No depression, no mood swings,   HEME/LYMPH: No easy bruising, no bleeding gums  ALLERY AND IMMUNOLOGIC: No hives       Consultant(s) Notes Reviewed:  [X] YES  [ ] NO    PHYSICAL EXAM:  GENERAL: NAD  HEAD:  Atraumatic, Normocephalic  EYES: EOMI, PERRLA, conjunctiva and sclera clear  ENMT: No tonsillar erythema, exudates, or enlargement; Moist mucous membranes  NECK: Supple, No JVD  NERVOUS SYSTEM:  Awake & alert  CHEST/LUNG: Clear to auscultation bilaterally; No rales, rhonchi, wheezing,  HEART: Regular rate and rhythm  ABDOMEN: Soft, Nontender, Nondistended; Bowel sounds present  EXTREMITIES:  No clubbing, cyanosis, or edema  LYMPH: No lymphadenopathy noted  SKIN: No rashes      Advanced care planning discussed with patient/family [X] YES   [ ] NO    Advanced care planning discussed with patient/family. Advanced care planning forms reviewed/discussed/completed. 20 minutes spent.

## 2019-03-04 NOTE — PROGRESS NOTE ADULT - SUBJECTIVE AND OBJECTIVE BOX
Date/Time Patient Seen:  		  Referring MD:   Data Reviewed	       Patient is a 59y old  Male who presents with a chief complaint of cough (03 Mar 2019 12:12)      Subjective/HPI     PAST MEDICAL & SURGICAL HISTORY:  H/O pleural effusion: DX IN 2011 when pt had pneumonia  chronic condition now  CIRILO (obstructive sleep apnea)  Obesity  Migraines  Pneumonia: 2011  Smoking hx  H/O ETOH abuse: sober x 20 yrs  Drug abuse, in remission: sober x 20 yrs  HLD (hyperlipidemia)  Schizophrenia: pt never stated he had schizophrenia during interview but is on medication for it.  Skin lesion: face   &quot;pre-cancerous&quot;  Cyst: scalp excised 20 yrs ago        Medication list         MEDICATIONS  (STANDING):  ALBUTerol/ipratropium for Nebulization 3 milliLiter(s) Nebulizer every 6 hours  aluminum hydroxide/magnesium hydroxide/simethicone Suspension 30 milliLiter(s) Oral every 6 hours  atorvastatin 20 milliGRAM(s) Oral at bedtime  benzonatate 100 milliGRAM(s) Oral three times a day  cloZAPine 100 milliGRAM(s) Oral three times a day  docusate sodium 100 milliGRAM(s) Oral three times a day  enoxaparin Injectable 40 milliGRAM(s) SubCutaneous daily  glycerin Suppository - Adult 1 Suppository(s) Rectal at bedtime  guaiFENesin  milliGRAM(s) Oral every 12 hours  influenza   Vaccine 0.5 milliLiter(s) IntraMuscular once  pantoprazole    Tablet 40 milliGRAM(s) Oral before breakfast  PARoxetine 30 milliGRAM(s) Oral daily  piperacillin/tazobactam IVPB. 3.375 Gram(s) IV Intermittent every 8 hours  polyethylene glycol 3350 17 Gram(s) Oral daily  senna 2 Tablet(s) Oral at bedtime    MEDICATIONS  (PRN):  acetaminophen   Tablet .. 650 milliGRAM(s) Oral every 6 hours PRN Temp greater or equal to 38C (100.4F), Mild Pain (1 - 3)  clonazePAM Tablet 1 milliGRAM(s) Oral three times a day PRN anxiety/agitation  guaiFENesin    Syrup 100 milliGRAM(s) Oral every 6 hours PRN Cough  HYDROcodone/homatropine Syrup 5 milliLiter(s) Oral three times a day PRN severe cough  melatonin 3 milliGRAM(s) Oral at bedtime PRN Insomnia  traMADol 25 milliGRAM(s) Oral every 6 hours PRN Moderate Pain (4 - 6)  traMADol 50 milliGRAM(s) Oral every 6 hours PRN Severe Pain (7 - 10)         Vitals log        ICU Vital Signs Last 24 Hrs  T(C): 36.8 (04 Mar 2019 05:08), Max: 38.2 (03 Mar 2019 21:50)  T(F): 98.2 (04 Mar 2019 05:08), Max: 100.7 (03 Mar 2019 21:50)  HR: 72 (04 Mar 2019 05:08) (71 - 82)  BP: 99/64 (04 Mar 2019 05:08) (99/64 - 118/77)  BP(mean): 91 (03 Mar 2019 21:50) (91 - 91)  ABP: --  ABP(mean): --  RR: 18 (04 Mar 2019 05:08) (16 - 18)  SpO2: 91% (04 Mar 2019 05:08) (91% - 94%)           Input and Output:  I&O's Detail      Lab Data                        10.3   4.48  )-----------( 194      ( 02 Mar 2019 09:26 )             30.5     03-03    144  |  106  |  13  ----------------------------<  95  3.4<L>   |  30  |  1.20    Ca    8.1<L>      03 Mar 2019 23:58  Mg     2.3     03-02    TPro  6.3  /  Alb  2.3<L>  /  TBili  0.9  /  DBili  .40<H>  /  AST  114<H>  /  ALT  87<H>  /  AlkPhos  113  03-02            Review of Systems	      Objective     Physical Examination    obese  head at  heart s1s2  lung dec BS      Pertinent Lab findings & Imaging      Navarrete:  NO   Adequate UO     I&O's Detail           Discussed with:     Cultures:	        Radiology

## 2019-03-04 NOTE — DISCHARGE NOTE PROVIDER - CARE PROVIDER_API CALL
Marlen Ingram)  Family Medicine  [address] [place] [address]  Phone: [phone]  Fax: [phone]  Follow Up Time: 1 week

## 2019-03-04 NOTE — DIETITIAN INITIAL EVALUATION ADULT. - FACTORS AFF FOOD INTAKE
other (specify)/per RN takes food from home noted does not always like hospital food . per RN some meals taken better than others

## 2019-03-04 NOTE — PROGRESS NOTE ADULT - SUBJECTIVE AND OBJECTIVE BOX
infectious diseases progress note:    JAME CHAIDEZ is a 59y y. o. Male patient    Patient reports: "feeling much better but this cough is still a problem."    ROS:    EYES:  Negative  blurry vision or double vision  GASTROINTESTINAL:  Negative for nausea, vomiting, diarrhea  -otherwise negative except for subjective    Allergies    No Known Allergies    Intolerances        ANTIBIOTICS/RELEVANT:  antimicrobials  piperacillin/tazobactam IVPB. 3.375 Gram(s) IV Intermittent every 8 hours    immunologic:  influenza   Vaccine 0.5 milliLiter(s) IntraMuscular once    OTHER:  acetaminophen   Tablet .. 650 milliGRAM(s) Oral every 6 hours PRN  ALBUTerol/ipratropium for Nebulization 3 milliLiter(s) Nebulizer every 6 hours  aluminum hydroxide/magnesium hydroxide/simethicone Suspension 30 milliLiter(s) Oral every 6 hours  atorvastatin 20 milliGRAM(s) Oral at bedtime  benzonatate 100 milliGRAM(s) Oral three times a day  clonazePAM Tablet 1 milliGRAM(s) Oral three times a day PRN  cloZAPine 100 milliGRAM(s) Oral three times a day  docusate sodium 100 milliGRAM(s) Oral three times a day  enoxaparin Injectable 40 milliGRAM(s) SubCutaneous daily  glycerin Suppository - Adult 1 Suppository(s) Rectal at bedtime  guaiFENesin    Syrup 100 milliGRAM(s) Oral every 6 hours PRN  guaiFENesin  milliGRAM(s) Oral every 12 hours  HYDROcodone/homatropine Syrup 5 milliLiter(s) Oral three times a day PRN  melatonin 3 milliGRAM(s) Oral at bedtime PRN  pantoprazole    Tablet 40 milliGRAM(s) Oral before breakfast  PARoxetine 30 milliGRAM(s) Oral daily  polyethylene glycol 3350 17 Gram(s) Oral daily  senna 2 Tablet(s) Oral at bedtime  traMADol 25 milliGRAM(s) Oral every 6 hours PRN  traMADol 50 milliGRAM(s) Oral every 6 hours PRN      Objective:  Last 24-Vital Signs Last 24 Hrs  T(C): 36.8 (04 Mar 2019 05:08), Max: 38.2 (03 Mar 2019 21:50)  T(F): 98.2 (04 Mar 2019 05:08), Max: 100.7 (03 Mar 2019 21:50)  HR: 72 (04 Mar 2019 05:08) (71 - 82)  BP: 99/64 (04 Mar 2019 05:08) (99/64 - 118/77)  BP(mean): 91 (03 Mar 2019 21:50) (91 - 91)  RR: 18 (04 Mar 2019 05:08) (16 - 18)  SpO2: 91% (04 Mar 2019 05:08) (91% - 94%)    T(C): 36.8 (03-04-19 @ 05:08), Max: 38.2 (03-03-19 @ 21:50)  T(F): 98.2 (03-04-19 @ 05:08), Max: 100.7 (03-03-19 @ 21:50)  T(C): 36.8 (03-04-19 @ 05:08), Max: 38.9 (03-01-19 @ 12:23)  T(F): 98.2 (03-04-19 @ 05:08), Max: 102 (03-01-19 @ 12:23)  T(C): 36.8 (03-04-19 @ 05:08), Max: 39.1 (02-28-19 @ 18:08)  T(F): 98.2 (03-04-19 @ 05:08), Max: 102.3 (02-28-19 @ 18:08)    PHYSICAL EXAM:  Constitutional: Well-developed, well nourished  Eyes: PERRLA, EOMI  Ear/Nose/Throat: oropharynx normal	  Neck: no JVD, no lymphadenopathy, supple  Respiratory: no accessory muscle use, lung fields bilaterally with some asymmetry  Cardiovascular: RRR, normal S1, S2 no m/r/g  Gastrointestinal: soft, NT, no HSM, BS-normal  Extremities: no clubbing, no cyanosis, edema absent  Neuro: patient alert, oriented and appropriate  Skin: no sig lesions      LABS:                        11.1   5.25  )-----------( 280      ( 04 Mar 2019 07:13 )             32.1       WBC 5.25  03-04 @ 07:13  WBC 4.48  03-02 @ 09:26  WBC 3.95  03-01 @ 08:56  WBC 4.03  02-28 @ 08:37  WBC 5.48  02-27 @ 08:19  WBC 10.01  02-26 @ 07:02  WBC 12.53  02-25 @ 19:38      03-03    144  |  106  |  13  ----------------------------<  95  3.4<L>   |  30  |  1.20    Ca    8.1<L>      03 Mar 2019 23:58        Creatinine, Serum: 1.20 mg/dL (03-03-19 @ 23:58)  Creatinine, Serum: 1.40 mg/dL (03-02-19 @ 09:26)  Creatinine, Serum: 1.30 mg/dL (03-01-19 @ 08:56)  Creatinine, Serum: 1.70 mg/dL (02-28-19 @ 08:37)  Creatinine, Serum: 1.60 mg/dL (02-27-19 @ 08:19)  Creatinine, Serum: 1.60 mg/dL (02-26-19 @ 07:02)  Creatinine, Serum: 1.60 mg/dL (02-25-19 @ 19:38)      MICROBIOLOGY:        RADIOLOGY & ADDITIONAL STUDIES:

## 2019-03-04 NOTE — PROGRESS NOTE ADULT - SUBJECTIVE AND OBJECTIVE BOX
[facility] Card[facility] Progress Note [phone] (Dr. Dinh, Karen, Jose R, Stephy)    CHIEF COMPLAINT: Patient is a 59y old  Male who presents with a chief complaint of cough (04 Mar 2019 06:32)      Follow Up Today: The patient denies any chest discomfort or shortness of breath.    HPI:  Pt is a 60 yo male who presents to the ED with a cc of flu-like illness.  PMHx of h/o prior drug abuse currently in remission, h/o ETOH abuse currently in remission, h/o pleural effusion, HLD, migraine, obesity, pneumonia, Schizophrenia.  Pt reports that for the last several days he has not been feeling well.  He reports fevers T max of 103.5 with associated chills, SOB, and cough productive of yellow sputum.  Pt reports that symptoms have been worsening and so he followed up with his PMD today and was told to come to the ED for concern for possible pneumonia.  Pt denies N/V/D/C, CP, ext numbness or weakness.  He reports that he did receive an influenza vaccine this year. Also c/o abdominal pain. (26 Feb 2019 11:23)      PAST MEDICAL & SURGICAL HISTORY:  H/O pleural effusion: DX IN 2011 when pt had pneumonia  chronic condition now  Obesity  Migraines  Pneumonia: 2011  Smoking hx  H/O ETOH abuse: sober x 20 yrs  Drug abuse, in remission: sober x 20 yrs  HLD (hyperlipidemia)  Schizophrenia: pt never stated he had schizophrenia during interview but is on medication for it.  Skin lesion: face   &quot;pre-cancerous&quot;  Cyst: scalp excised 20 yrs ago      MEDICATIONS  (STANDING):  ALBUTerol/ipratropium for Nebulization 3 milliLiter(s) Nebulizer every 6 hours  aluminum hydroxide/magnesium hydroxide/simethicone Suspension 30 milliLiter(s) Oral every 6 hours  atorvastatin 20 milliGRAM(s) Oral at bedtime  benzonatate 100 milliGRAM(s) Oral three times a day  cloZAPine 100 milliGRAM(s) Oral three times a day  docusate sodium 100 milliGRAM(s) Oral three times a day  enoxaparin Injectable 40 milliGRAM(s) SubCutaneous daily  glycerin Suppository - Adult 1 Suppository(s) Rectal at bedtime  guaiFENesin  milliGRAM(s) Oral every 12 hours  influenza   Vaccine 0.5 milliLiter(s) IntraMuscular once  pantoprazole    Tablet 40 milliGRAM(s) Oral before breakfast  PARoxetine 30 milliGRAM(s) Oral daily  piperacillin/tazobactam IVPB. 3.375 Gram(s) IV Intermittent every 8 hours  polyethylene glycol 3350 17 Gram(s) Oral daily  senna 2 Tablet(s) Oral at bedtime    MEDICATIONS  (PRN):  acetaminophen   Tablet .. 650 milliGRAM(s) Oral every 6 hours PRN Temp greater or equal to 38C (100.4F), Mild Pain (1 - 3)  clonazePAM Tablet 1 milliGRAM(s) Oral three times a day PRN anxiety/agitation  guaiFENesin    Syrup 100 milliGRAM(s) Oral every 6 hours PRN Cough  HYDROcodone/homatropine Syrup 5 milliLiter(s) Oral three times a day PRN severe cough  melatonin 3 milliGRAM(s) Oral at bedtime PRN Insomnia  traMADol 25 milliGRAM(s) Oral every 6 hours PRN Moderate Pain (4 - 6)  traMADol 50 milliGRAM(s) Oral every 6 hours PRN Severe Pain (7 - 10)      Allergies    No Known Allergies    Intolerances        REVIEW OF SYSTEMS:    All other review of systems is negative unless indicated above    Vital Signs Last 24 Hrs  T(C): 36.8 (04 Mar 2019 05:08), Max: 38.2 (03 Mar 2019 21:50)  T(F): 98.2 (04 Mar 2019 05:08), Max: 100.7 (03 Mar 2019 21:50)  HR: 72 (04 Mar 2019 05:08) (71 - 82)  BP: 99/64 (04 Mar 2019 05:08) (99/64 - 118/77)  BP(mean): 91 (03 Mar 2019 21:50) (91 - 91)  RR: 18 (04 Mar 2019 05:08) (16 - 18)  SpO2: 91% (04 Mar 2019 05:08) (91% - 94%)    I&O's Summary      PHYSICAL EXAM:    Constitutional: NAD, awake and alert, well-developed  Eyes:  EOMI,  Pupils round, No oral cyanosis.  HEENT: No exudate or erythema  Pulmonary: Decreased BS  Cardiovascular: Regular, S1 and S2, No murmurs  Gastrointestinal: Bowel Sounds present, soft, nontender.   Ext: No significant LE edema   Neurological: Alert, no gross focal motor deficits  Skin: No rashes.  Psych:  Mood & affect appropriate    LABS: All Labs Reviewed:                        11.1   5.25  )-----------( 280      ( 04 Mar 2019 07:13 )             32.1                         10.3   4.48  )-----------( 194      ( 02 Mar 2019 09:26 )             30.5                         10.5   3.95  )-----------( 158      ( 01 Mar 2019 08:56 )             30.7     03 Mar 2019 23:58    144    |  106    |  13     ----------------------------<  95     3.4     |  30     |  1.20   02 Mar 2019 09:26    140    |  105    |  14     ----------------------------<  88     3.3     |  26     |  1.40   01 Mar 2019 08:56    143    |  109    |  15     ----------------------------<  103    3.6     |  28     |  1.30     Ca    8.1        03 Mar 2019 23:58  Ca    7.7        02 Mar 2019 09:26  Ca    7.9        01 Mar 2019 08:56  Mg     2.3       02 Mar 2019 09:26  Mg     2.4       01 Mar 2019 08:56    TPro  6.3    /  Alb  2.3    /  TBili  0.9    /  DBili  .40    /  AST  114    /  ALT  87     /  AlkPhos  113    02 Mar 2019 09:26          Blood Culture: Organism --  Gram Stain Blood -- Gram Stain --  Specimen Source .Blood Blood  Culture-Blood --    Organism --  Gram Stain Blood -- Gram Stain --  Specimen Source .Blood Blood  Culture-Blood --            RADIOLOGY/EKG:    Attending Attestation:   20 minutes spent on total encounter; more than 50% of the visit was spent counseling and/or coordinating care by the attending physician.     ASSESSMENT AND PLAN [facility] Progress Note [phone] (Dr. Dinh, Karen, Jose R, Stephy)    CHIEF COMPLAINT: Patient is a 59y old  Male who presents with a chief complaint of cough (04 Mar 2019 06:32)      Follow Up Today: The patient denies any chest discomfort or shortness of breath. Fevers overnight and still with cough but is eager to go home.    HPI:  Pt is a 58 yo male who presents to the ED with a cc of flu-like illness.  PMHx of h/o prior drug abuse currently in remission, h/o ETOH abuse currently in remission, h/o pleural effusion, HLD, migraine, obesity, pneumonia, Schizophrenia.  Pt reports that for the last several days he has not been feeling well.  He reports fevers T max of 103.5 with associated chills, SOB, and cough productive of yellow sputum.  Pt reports that symptoms have been worsening and so he followed up with his PMD today and was told to come to the ED for concern for possible pneumonia.  Pt denies N/V/D/C, CP, ext numbness or weakness.  He reports that he did receive an influenza vaccine this year. Also c/o abdominal pain. (26 Feb 2019 11:23)      PAST MEDICAL & SURGICAL HISTORY:  H/O pleural effusion: DX IN 2011 when pt had pneumonia  chronic condition now  Obesity  Migraines  Pneumonia: 2011  Smoking hx  H/O ETOH abuse: sober x 20 yrs  Drug abuse, in remission: sober x 20 yrs  HLD (hyperlipidemia)  Schizophrenia: pt never stated he had schizophrenia during interview but is on medication for it.  Skin lesion: face   &quot;pre-cancerous&quot;  Cyst: scalp excised 20 yrs ago      MEDICATIONS  (STANDING):  ALBUTerol/ipratropium for Nebulization 3 milliLiter(s) Nebulizer every 6 hours  aluminum hydroxide/magnesium hydroxide/simethicone Suspension 30 milliLiter(s) Oral every 6 hours  atorvastatin 20 milliGRAM(s) Oral at bedtime  benzonatate 100 milliGRAM(s) Oral three times a day  cloZAPine 100 milliGRAM(s) Oral three times a day  docusate sodium 100 milliGRAM(s) Oral three times a day  enoxaparin Injectable 40 milliGRAM(s) SubCutaneous daily  glycerin Suppository - Adult 1 Suppository(s) Rectal at bedtime  guaiFENesin  milliGRAM(s) Oral every 12 hours  influenza   Vaccine 0.5 milliLiter(s) IntraMuscular once  pantoprazole    Tablet 40 milliGRAM(s) Oral before breakfast  PARoxetine 30 milliGRAM(s) Oral daily  piperacillin/tazobactam IVPB. 3.375 Gram(s) IV Intermittent every 8 hours  polyethylene glycol 3350 17 Gram(s) Oral daily  senna 2 Tablet(s) Oral at bedtime    MEDICATIONS  (PRN):  acetaminophen   Tablet .. 650 milliGRAM(s) Oral every 6 hours PRN Temp greater or equal to 38C (100.4F), Mild Pain (1 - 3)  clonazePAM Tablet 1 milliGRAM(s) Oral three times a day PRN anxiety/agitation  guaiFENesin    Syrup 100 milliGRAM(s) Oral every 6 hours PRN Cough  HYDROcodone/homatropine Syrup 5 milliLiter(s) Oral three times a day PRN severe cough  melatonin 3 milliGRAM(s) Oral at bedtime PRN Insomnia  traMADol 25 milliGRAM(s) Oral every 6 hours PRN Moderate Pain (4 - 6)  traMADol 50 milliGRAM(s) Oral every 6 hours PRN Severe Pain (7 - 10)      Allergies    No Known Allergies    Intolerances        REVIEW OF SYSTEMS:    All other review of systems is negative unless indicated above    Vital Signs Last 24 Hrs  T(C): 36.8 (04 Mar 2019 05:08), Max: 38.2 (03 Mar 2019 21:50)  T(F): 98.2 (04 Mar 2019 05:08), Max: 100.7 (03 Mar 2019 21:50)  HR: 72 (04 Mar 2019 05:08) (71 - 82)  BP: 99/64 (04 Mar 2019 05:08) (99/64 - 118/77)  BP(mean): 91 (03 Mar 2019 21:50) (91 - 91)  RR: 18 (04 Mar 2019 05:08) (16 - 18)  SpO2: 91% (04 Mar 2019 05:08) (91% - 94%)    I&O's Summary      PHYSICAL EXAM:    Constitutional: NAD, awake and alert, well-developed  Eyes:  EOMI,  Pupils round, No oral cyanosis.  HEENT: No exudate or erythema  Pulmonary: Decreased BS  Cardiovascular: Regular, S1 and S2, No murmurs  Gastrointestinal: Bowel Sounds present, soft, nontender.   Ext: No significant LE edema   Neurological: Alert, no gross focal motor deficits  Skin: No rashes.  Psych:  Mood & affect appropriate    LABS: All Labs Reviewed:                        11.1   5.25  )-----------( 280      ( 04 Mar 2019 07:13 )             32.1                         10.3   4.48  )-----------( 194      ( 02 Mar 2019 09:26 )             30.5                         10.5   3.95  )-----------( 158      ( 01 Mar 2019 08:56 )             30.7     03 Mar 2019 23:58    144    |  106    |  13     ----------------------------<  95     3.4     |  30     |  1.20   02 Mar 2019 09:26    140    |  105    |  14     ----------------------------<  88     3.3     |  26     |  1.40   01 Mar 2019 08:56    143    |  109    |  15     ----------------------------<  103    3.6     |  28     |  1.30     Ca    8.1        03 Mar 2019 23:58  Ca    7.7        02 Mar 2019 09:26  Ca    7.9        01 Mar 2019 08:56  Mg     2.3       02 Mar 2019 09:26  Mg     2.4       01 Mar 2019 08:56    TPro  6.3    /  Alb  2.3    /  TBili  0.9    /  DBili  .40    /  AST  114    /  ALT  87     /  AlkPhos  113    02 Mar 2019 09:26          Blood Culture: Organism --  Gram Stain Blood -- Gram Stain --  Specimen Source .Blood Blood  Culture-Blood --    Organism --  Gram Stain Blood -- Gram Stain --  Specimen Source .Blood Blood  Culture-Blood --            RADIOLOGY/EKG:    Attending Attestation:   20 minutes spent on total encounter; more than 50% of the visit was spent counseling and/or coordinating care by the attending physician.     ASSESSMENT AND PLAN

## 2019-03-04 NOTE — DIETITIAN INITIAL EVALUATION ADULT. - OTHER INFO
patient seen sleeping this AM. 75% of one meal recorded taken. per RN as above PO varies food from home soup and bagels taken. patient seen LOS 7 days.

## 2019-03-04 NOTE — PROGRESS NOTE ADULT - ASSESSMENT
59M with h/o smoking, Etoh abuse, pleural effusion and hyperlipidemia for endoscopy.      < from: TTE Echo Doppler w/o Cont (03.02.19 @ 10:59) >  Conclusion:   1. This a very technically limited study.  2. There is normal left ventricular size and systolic function with an   ejection fraction of 60%.  3. Views of endocardial definition are limited on this study which   inhibits the interpretation of wall motion abnormalities.  4. There is mild concentric left ventricular hypertrophy.  5. The aortic root is mildly dilated at 3.9 cm.  6. There is trivial tricuspid regurgitation.  7. There is trivial regurgitation.  8. There is mitral annular calcification.  9. There is a normal diastolic flow pattern.  10. There is normal right atrial and right ventricular size and systolic   function.  11. There is no gross pericardial effusion.    Blood Pressure 112/73 mmHg         BSA 2.22 sq m    Dimensions:    LA 3.6 cm       Normal Values: 2.0 - 4.0 cm    Ao 3.9 cm        Normal Values: 2.0 - 3.8 cm  IVSd 1.3 cm      Normal Values: 0.6 - 1.2 cm  PWd 1.1 cm       Normal Values: 0.6 - 1.1 cm  LVIDd 4.8 cm         Normal Values: 3.0 - 5.6 cm  LVIDs 3.9 cm         Normal Values: 1.8 - 4.0 cm    < end of copied text >    < from: Xray Chest 2 Views PA/Lat (03.02.19 @ 07:55) >    Findings.      There is persistent right upper lobeairspace consolidation.  There are low lung volumes resulting in crowding of the bronchovascular  markings at the lung bases. Probable small bibasilar effusion. Cardiac   and mediastinal structures are stable. Thoracic spondylosis.    Impression:    Persistent right upper lobe infiltrate follow-up to resolution is   suggested.    < end of copied text >      Suggest:    1. Cardiac optimized for EGD  2. Continue with Lipitor for hyperlipidemia  3. Advised to not resume cigarette smoking.  4. Abx per pulm  5. DVT prophylaxis.    6. Will follow 59M with h/o smoking, Etoh abuse, pleural effusion and hyperlipidemia for endoscopy. He appear clinically improving.      < from: TTE Echo Doppler w/o Cont (03.02.19 @ 10:59) >  Conclusion:   1. This a very technically limited study.  2. There is normal left ventricular size and systolic function with an   ejection fraction of 60%.  3. Views of endocardial definition are limited on this study which   inhibits the interpretation of wall motion abnormalities.  4. There is mild concentric left ventricular hypertrophy.  5. The aortic root is mildly dilated at 3.9 cm.  6. There is trivial tricuspid regurgitation.  7. There is trivial regurgitation.  8. There is mitral annular calcification.  9. There is a normal diastolic flow pattern.  10. There is normal right atrial and right ventricular size and systolic   function.  11. There is no gross pericardial effusion.    Blood Pressure 112/73 mmHg         BSA 2.22 sq m    Dimensions:    LA 3.6 cm       Normal Values: 2.0 - 4.0 cm    Ao 3.9 cm        Normal Values: 2.0 - 3.8 cm  IVSd 1.3 cm      Normal Values: 0.6 - 1.2 cm  PWd 1.1 cm       Normal Values: 0.6 - 1.1 cm  LVIDd 4.8 cm         Normal Values: 3.0 - 5.6 cm  LVIDs 3.9 cm         Normal Values: 1.8 - 4.0 cm    < end of copied text >    < from: Xray Chest 2 Views PA/Lat (03.02.19 @ 07:55) >    Findings.      There is persistent right upper lobeairspace consolidation.  There are low lung volumes resulting in crowding of the bronchovascular  markings at the lung bases. Probable small bibasilar effusion. Cardiac   and mediastinal structures are stable. Thoracic spondylosis.    Impression:    Persistent right upper lobe infiltrate follow-up to resolution is   suggested.    < end of copied text >      Suggest:    1. Cardiac optimized for EGD  2. Continue with Lipitor for hyperlipidemia  3. Advised to not resume cigarette smoking.  4. Abx per pulm  5. DVT prophylaxis.    6. Will follow

## 2019-03-04 NOTE — DISCHARGE NOTE PROVIDER - NSDCCPCAREPLAN_GEN_ALL_CORE_FT
PRINCIPAL DISCHARGE DIAGNOSIS  Problem: Pneumonia  Assessment and Plan of Treatment: Finish course of antibiotics.   Follow-up with your primary care doctor within 1 week.  You need a repeat chest xray in 6-8 weeks

## 2019-03-05 ENCOUNTER — TRANSCRIPTION ENCOUNTER (OUTPATIENT)
Age: 59
End: 2019-03-05

## 2019-03-05 VITALS — OXYGEN SATURATION: 90 %

## 2019-03-05 PROCEDURE — 71250 CT THORAX DX C-: CPT

## 2019-03-05 PROCEDURE — 99285 EMERGENCY DEPT VISIT HI MDM: CPT | Mod: 25

## 2019-03-05 PROCEDURE — 86140 C-REACTIVE PROTEIN: CPT

## 2019-03-05 PROCEDURE — 87631 RESP VIRUS 3-5 TARGETS: CPT

## 2019-03-05 PROCEDURE — 36415 COLL VENOUS BLD VENIPUNCTURE: CPT

## 2019-03-05 PROCEDURE — 99231 SBSQ HOSP IP/OBS SF/LOW 25: CPT

## 2019-03-05 PROCEDURE — 97116 GAIT TRAINING THERAPY: CPT

## 2019-03-05 PROCEDURE — 80307 DRUG TEST PRSMV CHEM ANLYZR: CPT

## 2019-03-05 PROCEDURE — 87581 M.PNEUMON DNA AMP PROBE: CPT

## 2019-03-05 PROCEDURE — 83605 ASSAY OF LACTIC ACID: CPT

## 2019-03-05 PROCEDURE — 97530 THERAPEUTIC ACTIVITIES: CPT

## 2019-03-05 PROCEDURE — 80053 COMPREHEN METABOLIC PANEL: CPT

## 2019-03-05 PROCEDURE — 83880 ASSAY OF NATRIURETIC PEPTIDE: CPT

## 2019-03-05 PROCEDURE — 71046 X-RAY EXAM CHEST 2 VIEWS: CPT

## 2019-03-05 PROCEDURE — 83735 ASSAY OF MAGNESIUM: CPT

## 2019-03-05 PROCEDURE — 74176 CT ABD & PELVIS W/O CONTRAST: CPT

## 2019-03-05 PROCEDURE — 87798 DETECT AGENT NOS DNA AMP: CPT

## 2019-03-05 PROCEDURE — 93005 ELECTROCARDIOGRAM TRACING: CPT

## 2019-03-05 PROCEDURE — 87633 RESP VIRUS 12-25 TARGETS: CPT

## 2019-03-05 PROCEDURE — 85652 RBC SED RATE AUTOMATED: CPT

## 2019-03-05 PROCEDURE — 93306 TTE W/DOPPLER COMPLETE: CPT

## 2019-03-05 PROCEDURE — 85027 COMPLETE CBC AUTOMATED: CPT

## 2019-03-05 PROCEDURE — 82803 BLOOD GASES ANY COMBINATION: CPT

## 2019-03-05 PROCEDURE — 84443 ASSAY THYROID STIM HORMONE: CPT

## 2019-03-05 PROCEDURE — 97162 PT EVAL MOD COMPLEX 30 MIN: CPT

## 2019-03-05 PROCEDURE — 87040 BLOOD CULTURE FOR BACTERIA: CPT

## 2019-03-05 PROCEDURE — 84484 ASSAY OF TROPONIN QUANT: CPT

## 2019-03-05 PROCEDURE — 94640 AIRWAY INHALATION TREATMENT: CPT

## 2019-03-05 PROCEDURE — 80076 HEPATIC FUNCTION PANEL: CPT

## 2019-03-05 PROCEDURE — 80048 BASIC METABOLIC PNL TOTAL CA: CPT

## 2019-03-05 PROCEDURE — 86803 HEPATITIS C AB TEST: CPT

## 2019-03-05 PROCEDURE — 84145 PROCALCITONIN (PCT): CPT

## 2019-03-05 PROCEDURE — 71045 X-RAY EXAM CHEST 1 VIEW: CPT

## 2019-03-05 PROCEDURE — 82550 ASSAY OF CK (CPK): CPT

## 2019-03-05 PROCEDURE — 94760 N-INVAS EAR/PLS OXIMETRY 1: CPT

## 2019-03-05 PROCEDURE — 87486 CHLMYD PNEUM DNA AMP PROBE: CPT

## 2019-03-05 PROCEDURE — 83690 ASSAY OF LIPASE: CPT

## 2019-03-05 PROCEDURE — 84436 ASSAY OF TOTAL THYROXINE: CPT

## 2019-03-05 PROCEDURE — 99221 1ST HOSP IP/OBS SF/LOW 40: CPT

## 2019-03-05 RX ADMIN — PIPERACILLIN AND TAZOBACTAM 25 GRAM(S): 4; .5 INJECTION, POWDER, LYOPHILIZED, FOR SOLUTION INTRAVENOUS at 05:47

## 2019-03-05 RX ADMIN — Medication 100 MILLIGRAM(S): at 05:48

## 2019-03-05 RX ADMIN — CLOZAPINE 100 MILLIGRAM(S): 150 TABLET, ORALLY DISINTEGRATING ORAL at 05:48

## 2019-03-05 RX ADMIN — Medication 30 MILLIGRAM(S): at 12:20

## 2019-03-05 RX ADMIN — Medication 600 MILLIGRAM(S): at 05:48

## 2019-03-05 RX ADMIN — Medication 3 MILLILITER(S): at 08:02

## 2019-03-05 RX ADMIN — PANTOPRAZOLE SODIUM 40 MILLIGRAM(S): 20 TABLET, DELAYED RELEASE ORAL at 05:48

## 2019-03-05 NOTE — PROGRESS NOTE ADULT - PROBLEM SELECTOR PLAN 3
Continue clozaril and klonopin
Creatinine normalized
meds adj accordingly, improving
will need to considered when starting PO abx.
Continue clozaril and klonopin

## 2019-03-05 NOTE — PROGRESS NOTE ADULT - REASON FOR ADMISSION
cough

## 2019-03-05 NOTE — PROGRESS NOTE ADULT - PROBLEM SELECTOR PLAN 1
With recurrent fever on Friday and last night -- now afebrile  Repeat blood cultures NTD  Continue iv zosyn  Initial cultures NTD  pulmonary, ID f/u  nebulizers  oxygen prn  CT chest noted  May proceed for bronch in AM if needed  Further work-up/management pending clinical course.
Continue iv zosyn  pan-cultured  pulmonary f/u  nebulizers  oxygen prn  CT chest noted  Further work-up/management pending clinical course.
Continue iv zosyn  pan-cultured  pulmonary f/u  nebulizers  oxygen prn  CT chest noted  Further work-up/management pending clinical course.
With recurrent fever  Continue iv zosyn  pan-cultured  pulmonary f/u  nebulizers  oxygen prn  CT chest noted  Further work-up/management pending clinical course.
With recurrent fever on Friday -- now afebrile  Repeat blood cultures NTD  Continue iv zosyn  Initial cultures NTD  pulmonary, ID f/u  nebulizers  oxygen prn  CT chest noted  Further work-up/management pending clinical course.
ct reviewed, inc stool burden  exam benign  bld cxs ngtd  reports +bms  cont bowel regimen  cont ppi  trend lfts  diet as tolerated  monitor exam  egd as an outpt
ct reviewed, inc stool burden  exam benign  bld cxs ngtd  reports +bms  cont bowel regimen  cont ppi  trend lfts  diet as tolerated  monitor exam  egd as an outpt
ct reviewed, inc stool burden  exam benign  bld cxs ngtd  reports +bms  cont bowel regimen  cont ppi  trend lfts  diet as tolerated  monitor exam  egd when optimized
ct reviewed, inc stool burden  exam benign  bld cxs ngtd  reports +bms  cont bowel regimen  cont ppi  trend lfts  diet as tolerated  monitor exam  will plan for egd on friday when medically optimized, dw pt  will follow
ct reviewed, inc stool burden  exam benign  bld cxs ngtd  reports +bms  cont bowel regimen  cont ppi  trend lfts  diet as tolerated  monitor exam  will plan for egd on friday when medically optimized, dw pt  will follow
divya  htn  obesity  pna  flu and rsv neg  febrile overnight  on Zosyn  ID following  will consider Bronchoscopy in am tomorrow, NPO after MN, will discuss with ID  serial labs  serial PE  RVP pending  CXR reviewed  TTE reviewed  on room air now  divya - refuses - CPAP  monitor vs and HD and Sat
divya  pna  atelectasis  obesity  not tolerating cpap  on IV ABX  ID following  cxr reviewed - no resolution   will need follow up CXR - 6 - 8 weeks to eval resolution  increase activity  assess sat on room air - keep sat > 88 pct
pna  atelectasis  obesity  divya  psych hx  on Zosyn  bronchodilators and cough rx regimen  tylenol PRN  out of bed as tolerated  pt is on o2 support - keep sat > 88 pct  poss EGD as per GI today - pt is moderate risk - comorbidities plus acute PNA infection  will follow  am labs pending  ID following  will need CXR in 6 - 8 weeks to eval resolution of PNA
pna  divya  obesity  psych hx  atelectasis  will check CXR PA and LAT this am  cont ABX IV regimen  ID follow up noted  tylenol prn for fever  out of bed as tolerated  on o2 support - keep sat > 88 pct  on NEBS and Cough Rx regimen  will follow  discussed with pt this am
pna  obesity  divya  does not want CPAP  does not want Bronchoscopy - afebrile - feels better  ID following  pt is on Zosyn  DC planning - overall much better  will follow up with Dr. Hutton - CXR in 6 - 8 weeks to eval resolution of PNA  increase activity  discussed with pt
pna, divya, obesity, psych hx, atelectasis  labs and imaging and cx reviewed  ID eval noted  cont Zosyn   attempt to de - escalate BZD and opioids  cough rx regimen -   I xochilt if tolerated and able to cooperate  mobilize  out of bed as tolerated  o2 support - keep sat > 88 pct  reassurance and emotional support  Ct chest reviewed with the patient - will need follow up imaging 6 - 8 weeks
psych hx  pna  atelectasis  obesity  poor dentition  cont ABX regimen - Zosyn  mucinex - tessalon p - prn, robitussin prn  will check VBG  tolerating room air  out of bed as tolerated  increase activity  ct chest reviewed - extensive right upper lobe pna - aspiration in the differential- plus poor dental hygiene  will follow and monitor  discussed above with patient
resolved, possibly due to constipation  bld cxs ngtd  cont bowel regimen  cont ppi  trend lfts  diet as tolerated  monitor exam  plan for egd as an outpt
Afebrile since the 3rd and doing well.  Suspect protracted fever related to delay in presentation and extent of pneumonia.  Patient will need f/u imaging in 6 weeks, and if not resolved further intervention.  Augmentin 875mg PO Q12H x3 days
Not ready for PO antibiotics yet  Would like to see patient afebrile 48h before discharge  Patient will need to have radiographic findings followed to resolution  Pulmonary follow up
Not ready for PO antibiotics yet  Would like to see patient afebrile 48h before discharge  Patient will need to have radiographic findings followed to resolution  Pulmonary follow up  cont zosyn  f/u blood cx done yest  nontoxic currently
Overall seems improved- WBC down, temps moderated.  Current antibiotics adequate further recs to follow on possible change to po abx Friday and transition to outpt status based on next 24 hours  -pt concerned about cough so will defer on symptomatic management
patient is slowly improving but does seem improved and would not recommend bronchoscopy at this point but will follow along with pulmonary with possbile bronchoscopy for 3/5 depending on clininical course  -continue Zosyn for now but today may be last day to complete 7 days
now afebrile  Repeat blood cultures NTD  Change to po abx  Initial cultures NTD  pulmonary, ID f/u  nebulizers  oxygen prn  CT chest noted  Needs repeat CXR in 6-8 weeks  Refusing bronch
With recurrent fever  Repeat blood cultures sent yesterday  Continue iv zosyn  Initial cultures NTD  pulmonary, ID f/u  nebulizers  oxygen prn  CT chest noted  Further work-up/management pending clinical course.

## 2019-03-05 NOTE — DISCHARGE NOTE NURSING/CASE MANAGEMENT/SOCIAL WORK - NSDCDPATPORTLINK_GEN_ALL_CORE
You can access the [facility] Patient Portal, offered by [facility], by registering with the following website: [email or web address]

## 2019-03-05 NOTE — PROGRESS NOTE ADULT - ATTENDING COMMENTS
Advanced care planning was discussed with patient and family.  Advanced care planning forms were reviewed and discussed.  Risks, benefits and alternatives of gastroenterologic procedures were discussed in detail and all questions were answered.    30 minutes spent.
Call if ID input needed over the weekend, Dr. Stephanie Merritt is on call.    Oscar Yepez MD  [phone]
D/W patient all questions answered to the best of my abililty.    Thank you for the courtesy of this referral.    D/W Dr. Colón    I'll sign off at this time.     Oscar Yepez MD  [phone]
d/w Dr Colón
D/C planning in AM on po abx if stable

## 2019-03-05 NOTE — PROGRESS NOTE ADULT - PROVIDER SPECIALTY LIST ADULT
Cardiology
Gastroenterology
Infectious Disease
Internal Medicine
Pulmonology
Cardiology
Infectious Disease
Internal Medicine

## 2019-03-05 NOTE — PROGRESS NOTE ADULT - SUBJECTIVE AND OBJECTIVE BOX
INTERVAL HPI/OVERNIGHT EVENTS:  pt seen and examined  denies n/v/abd pain, reports moving his bowels  per overnight rn no acute gi issues  afebrile overnight no new labs to assess    MEDICATIONS  (STANDING):  ALBUTerol/ipratropium for Nebulization 3 milliLiter(s) Nebulizer every 6 hours  aluminum hydroxide/magnesium hydroxide/simethicone Suspension 30 milliLiter(s) Oral every 6 hours  atorvastatin 20 milliGRAM(s) Oral at bedtime  benzonatate 100 milliGRAM(s) Oral three times a day  cloZAPine 100 milliGRAM(s) Oral three times a day  docusate sodium 100 milliGRAM(s) Oral three times a day  enoxaparin Injectable 40 milliGRAM(s) SubCutaneous daily  glycerin Suppository - Adult 1 Suppository(s) Rectal at bedtime  guaiFENesin  milliGRAM(s) Oral every 12 hours  influenza   Vaccine 0.5 milliLiter(s) IntraMuscular once  pantoprazole    Tablet 40 milliGRAM(s) Oral before breakfast  PARoxetine 30 milliGRAM(s) Oral daily  piperacillin/tazobactam IVPB. 3.375 Gram(s) IV Intermittent every 8 hours  polyethylene glycol 3350 17 Gram(s) Oral daily  senna 2 Tablet(s) Oral at bedtime    MEDICATIONS  (PRN):  acetaminophen   Tablet .. 650 milliGRAM(s) Oral every 6 hours PRN Temp greater or equal to 38C (100.4F), Mild Pain (1 - 3)  guaiFENesin    Syrup 100 milliGRAM(s) Oral every 6 hours PRN Cough  HYDROcodone/homatropine Syrup 5 milliLiter(s) Oral three times a day PRN severe cough  melatonin 3 milliGRAM(s) Oral at bedtime PRN Insomnia  traMADol 25 milliGRAM(s) Oral every 6 hours PRN Moderate Pain (4 - 6)  traMADol 50 milliGRAM(s) Oral every 6 hours PRN Severe Pain (7 - 10)      Allergies    No Known Allergies    Intolerances        Review of Systems:    General:  No wt loss, fevers, chills, night sweats, fatigue   Eyes:  Good vision, no reported pain  ENT:  No sore throat, pain, runny nose, dysphagia  CV:  No pain, palpitations, hypo/hypertension  Resp:  No dyspnea, cough, tachypnea, wheezing  GI:  No pain, No nausea, No vomiting, No diarrhea, No constipation, No weight loss, No fever, No pruritis, No rectal bleeding, No melena, No dysphagia  :  No pain, bleeding, incontinence, nocturia  Muscle:  No pain, weakness  Neuro:  No weakness, tingling, memory problems  Psych:  No fatigue, insomnia, mood problems, depression  Endocrine:  No polyuria, polydypsia, cold/heat intolerance  Heme:  No petechiae, ecchymosis, easy bruisability  Skin:  No rash, tattoos, scars, edema      Vital Signs Last 24 Hrs  T(C): 36.6 (05 Mar 2019 04:45), Max: 36.6 (04 Mar 2019 21:39)  T(F): 97.9 (05 Mar 2019 04:45), Max: 97.9 (05 Mar 2019 04:45)  HR: 75 (05 Mar 2019 04:45) (69 - 87)  BP: 106/66 (05 Mar 2019 04:45) (104/82 - 129/83)  BP(mean): 98 (04 Mar 2019 21:39) (98 - 98)  RR: 17 (05 Mar 2019 04:45) (17 - 18)  SpO2: 91% (05 Mar 2019 05:27) (91% - 94%)    PHYSICAL EXAM:      Constitutional: NAD  HEENT: ncat poor dentition  Neck: No LAD  Respiratory: dec bs  Cardiovascular: S1 and S2, RRR  Gastrointestinal: obese soft nt  mild dt  Extremities: No peripheral edema  Vascular: 2+ peripheral pulses  Neurological: Awake alert   Skin: No rashes    LABS:                        11.1   5.25  )-----------( 280      ( 04 Mar 2019 07:13 )             32.1     03-03    144  |  106  |  13  ----------------------------<  95  3.4<L>   |  30  |  1.20    Ca    8.1<L>      03 Mar 2019 23:58            RADIOLOGY & ADDITIONAL TESTS:

## 2019-03-05 NOTE — PROGRESS NOTE ADULT - PROBLEM SELECTOR PROBLEM 3
Schizophrenia
Renal insufficiency
Schizophrenia

## 2019-03-05 NOTE — PROGRESS NOTE ADULT - SUBJECTIVE AND OBJECTIVE BOX
Date/Time Patient Seen:  		  Referring MD:   Data Reviewed	       Patient is a 59y old  Male who presents with a chief complaint of cough (04 Mar 2019 21:22)      Subjective/HPI     PAST MEDICAL & SURGICAL HISTORY:  H/O pleural effusion: DX IN 2011 when pt had pneumonia  chronic condition now  CIRILO (obstructive sleep apnea)  Obesity  Migraines  Pneumonia: 2011  Smoking hx  H/O ETOH abuse: sober x 20 yrs  Drug abuse, in remission: sober x 20 yrs  HLD (hyperlipidemia)  Schizophrenia: pt never stated he had schizophrenia during interview but is on medication for it.  Skin lesion: face   &quot;pre-cancerous&quot;  Cyst: scalp excised 20 yrs ago        Medication list         MEDICATIONS  (STANDING):  ALBUTerol/ipratropium for Nebulization 3 milliLiter(s) Nebulizer every 6 hours  aluminum hydroxide/magnesium hydroxide/simethicone Suspension 30 milliLiter(s) Oral every 6 hours  atorvastatin 20 milliGRAM(s) Oral at bedtime  benzonatate 100 milliGRAM(s) Oral three times a day  cloZAPine 100 milliGRAM(s) Oral three times a day  docusate sodium 100 milliGRAM(s) Oral three times a day  enoxaparin Injectable 40 milliGRAM(s) SubCutaneous daily  glycerin Suppository - Adult 1 Suppository(s) Rectal at bedtime  guaiFENesin  milliGRAM(s) Oral every 12 hours  influenza   Vaccine 0.5 milliLiter(s) IntraMuscular once  pantoprazole    Tablet 40 milliGRAM(s) Oral before breakfast  PARoxetine 30 milliGRAM(s) Oral daily  piperacillin/tazobactam IVPB. 3.375 Gram(s) IV Intermittent every 8 hours  polyethylene glycol 3350 17 Gram(s) Oral daily  senna 2 Tablet(s) Oral at bedtime    MEDICATIONS  (PRN):  acetaminophen   Tablet .. 650 milliGRAM(s) Oral every 6 hours PRN Temp greater or equal to 38C (100.4F), Mild Pain (1 - 3)  guaiFENesin    Syrup 100 milliGRAM(s) Oral every 6 hours PRN Cough  HYDROcodone/homatropine Syrup 5 milliLiter(s) Oral three times a day PRN severe cough  melatonin 3 milliGRAM(s) Oral at bedtime PRN Insomnia  traMADol 25 milliGRAM(s) Oral every 6 hours PRN Moderate Pain (4 - 6)  traMADol 50 milliGRAM(s) Oral every 6 hours PRN Severe Pain (7 - 10)         Vitals log        ICU Vital Signs Last 24 Hrs  T(C): 36.6 (05 Mar 2019 04:45), Max: 36.6 (04 Mar 2019 21:39)  T(F): 97.9 (05 Mar 2019 04:45), Max: 97.9 (05 Mar 2019 04:45)  HR: 75 (05 Mar 2019 04:45) (69 - 87)  BP: 106/66 (05 Mar 2019 04:45) (104/82 - 129/83)  BP(mean): 98 (04 Mar 2019 21:39) (98 - 98)  ABP: --  ABP(mean): --  RR: 17 (05 Mar 2019 04:45) (17 - 18)  SpO2: 91% (05 Mar 2019 05:27) (91% - 94%)           Input and Output:  I&O's Detail      Lab Data                        11.1   5.25  )-----------( 280      ( 04 Mar 2019 07:13 )             32.1     03-03    144  |  106  |  13  ----------------------------<  95  3.4<L>   |  30  |  1.20    Ca    8.1<L>      03 Mar 2019 23:58              Review of Systems	      Objective     Physical Examination    obese  head at  heart s1s2  lung dec BS  abd soft  on room air  cn grossly int      Pertinent Lab findings & Imaging      Landon:  NO   Adequate UO     I&O's Detail           Discussed with:     Cultures:	        Radiology

## 2019-03-05 NOTE — PROGRESS NOTE ADULT - PROBLEM SELECTOR PLAN 2
Patient likely with CKD3  Stable  Monitor Cr  Further work-up/management pending clinical course.
abx  aspiration prec   pulm following  consider slp eval  care per medicine
abx per id  aspiration prec   pulm following  care per medicine
per medicine/psych
Patient likely with CKD3  Stable  Monitor Cr  Further work-up/management pending clinical course.
Patient likely with CKD3  Stable  Monitor Cr  Further work-up/management pending clinical course.

## 2019-03-05 NOTE — PROGRESS NOTE ADULT - PROBLEM SELECTOR PLAN 4
Possibly secondary to constipation  Resolved  He may not want EGD  Continue bowel regimen  GI f/u
Possibly secondary to constipation  Resolved  Refusing EGD  Continue bowel regimen  GI f/u
Possibly secondary to constipation  Improving  For EGD on Friday   Continue bowel regimen  GI f/u
Possibly secondary to constipation  Improving  For EGD on Friday if ok with pulmonary  Continue bowel regimen  GI f/u
Possibly secondary to constipation  Improving  May proceed for EGD -- patient moderate risk  Continue bowel regimen  GI f/u
Possibly secondary to constipation  Resolved  Refusing EGD  Continue bowel regimen  GI f/u
Possibly secondary to constipation  Resolved  Refusing EGD  Continue bowel regimen  GI f/u

## 2019-03-05 NOTE — PROGRESS NOTE ADULT - SUBJECTIVE AND OBJECTIVE BOX
[facility], Division of Infectious Diseases  CASANDRA Dyson A. Lee  [phone]    Name: JAME CHAIDEZ  Age: 59y  Gender: Male  MRN: [id number]    Interval History--  Notes reviewed. Feels ok. Cough lingering but improved. No fevers, chills, or rigors. Denies any SOB or CP. No other complaints. Tolerating PO without problems, Comfortable of RA.     Past Medical History--  H/O pleural effusion  CIRILO (obstructive sleep apnea)  Obesity  Migraines  Pneumonia  Smoking hx  H/O ETOH abuse  Drug abuse, in remission  HLD (hyperlipidemia)  Schizophrenia  Skin lesion  Cyst      For details regarding the patient's social history, family history, and other miscellaneous elements, please refer the initial infectious diseases consultation and/or the admitting history and physical examination for this admission.    Allergies    No Known Allergies    Intolerances        Medications--  Antibiotics: S/P Zosyn    Immunologic:  influenza   Vaccine 0.5 milliLiter(s) IntraMuscular once    Other:  acetaminophen   Tablet .. PRN  ALBUTerol/ipratropium for Nebulization  aluminum hydroxide/magnesium hydroxide/simethicone Suspension  atorvastatin  benzonatate  cloZAPine  docusate sodium  enoxaparin Injectable  glycerin Suppository - Adult  guaiFENesin    Syrup PRN  guaiFENesin ER  HYDROcodone/homatropine Syrup PRN  melatonin PRN  pantoprazole    Tablet  PARoxetine  polyethylene glycol 3350  senna  traMADol PRN  traMADol PRN      Review of Systems--  A 10-point review of systems was obtained.   Review of systems otherwise unchanged compared to prior visit except as previously noted.    Physical Examination--  Vital Signs: T(F): 97.9 (03-05-19 @ 04:45), Max: 97.9 (03-05-19 @ 04:45)  HR: 75 (03-05-19 @ 04:45)  BP: 106/66 (03-05-19 @ 04:45)  RR: 17 (03-05-19 @ 04:45)  SpO2: 91% (03-05-19 @ 05:27)  Wt(kg): --  General: Nontoxic-appearing Male in no acute distress.  HEENT: AT/NC. Anicteric. Conjunctiva pink and moist. Oropharynx clear. Dentition poor.  Neck: Not rigid. No sense of mass.  Nodes: None palpable.  Lungs: Better BS R lung. No R/W/R. L lung clear.  Heart: Regular rate and rhythm. No Murmur. No rub. No gallop. No palpable thrill.  Abdomen: Bowel sounds present and normoactive. Soft. Nondistended. Nontender. No sense of mass. No organomegaly. Obese.   Extremities: No cyanosis or clubbing. No edema.   Skin: Warm. Dry. Good turgor. No rash. No vasculitic stigmata.  Psychiatric: More appropriate affect, appropriate mood        Laboratory Studies--  CBC                        11.1   5.25  )-----------( 280      ( 04 Mar 2019 07:13 )             32.1       Chemistries  03-03    144  |  106  |  13  ----------------------------<  95  3.4<L>   |  30  |  1.20    Ca    8.1<L>      03 Mar 2019 23:58        Culture Data    Culture - Blood (collected 01 Mar 2019 22:11)  Source: .Blood Blood  Preliminary Report (02 Mar 2019 23:01):    No growth to date.    Culture - Blood (collected 01 Mar 2019 22:08)  Source: .Blood Blood  Preliminary Report (02 Mar 2019 23:01):    No growth to date.

## 2019-03-05 NOTE — PROGRESS NOTE ADULT - PROBLEM SELECTOR PLAN 5
GI/DVT prophylaxis

## 2019-03-05 NOTE — PROGRESS NOTE ADULT - PROBLEM SELECTOR PROBLEM 2
Renal insufficiency
Pneumonia
Renal insufficiency
Schizophrenia
Renal insufficiency
Renal insufficiency

## 2019-03-05 NOTE — PROGRESS NOTE ADULT - PROBLEM SELECTOR PROBLEM 4
Abdominal pain

## 2019-03-05 NOTE — PROGRESS NOTE ADULT - SUBJECTIVE AND OBJECTIVE BOX
[facility] Cardiology    CHIEF COMPLAINT: Patient is a 59y old  Male who presents with a chief complaint of cough (05 Mar 2019 07:59)      Follow Up: [ ] Chest Pain      [ ] Dyspnea     [ ] Palpitations    [ ] Atrial Fibrillation     [ ] Ventricular Dysrhythmia    [ ] Abnormal EKG                      [ ] Abnormal Cardiac Enzymes     [ ] Valvular Disease    HPI:  Pt is a 60 yo male who presents to the ED with a cc of flu-like illness.  PMHx of h/o prior drug abuse currently in remission, h/o ETOH abuse currently in remission, h/o pleural effusion, HLD, migraine, obesity, pneumonia, Schizophrenia.  Pt reports that for the last several days he has not been feeling well.  He reports fevers T max of 103.5 with associated chills, SOB, and cough productive of yellow sputum.  Pt reports that symptoms have been worsening and so he followed up with his PMD today and was told to come to the ED for concern for possible pneumonia.  Pt denies N/V/D/C, CP, ext numbness or weakness.  He reports that he did receive an influenza vaccine this year. Also c/o abdominal pain. (26 Feb 2019 11:23)    pt denies CP or SOB    PAST MEDICAL & SURGICAL HISTORY:  H/O pleural effusion: DX IN 2011 when pt had pneumonia  chronic condition now  Obesity  Migraines  Pneumonia: 2011  Smoking hx  H/O ETOH abuse: sober x 20 yrs  Drug abuse, in remission: sober x 20 yrs  HLD (hyperlipidemia)  Schizophrenia: pt never stated he had schizophrenia during interview but is on medication for it.  Skin lesion: face   &quot;pre-cancerous&quot;  Cyst: scalp excised 20 yrs ago      MEDICATIONS  (STANDING):  ALBUTerol/ipratropium for Nebulization 3 milliLiter(s) Nebulizer every 6 hours  aluminum hydroxide/magnesium hydroxide/simethicone Suspension 30 milliLiter(s) Oral every 6 hours  atorvastatin 20 milliGRAM(s) Oral at bedtime  benzonatate 100 milliGRAM(s) Oral three times a day  cloZAPine 100 milliGRAM(s) Oral three times a day  docusate sodium 100 milliGRAM(s) Oral three times a day  enoxaparin Injectable 40 milliGRAM(s) SubCutaneous daily  glycerin Suppository - Adult 1 Suppository(s) Rectal at bedtime  guaiFENesin  milliGRAM(s) Oral every 12 hours  influenza   Vaccine 0.5 milliLiter(s) IntraMuscular once  pantoprazole    Tablet 40 milliGRAM(s) Oral before breakfast  PARoxetine 30 milliGRAM(s) Oral daily  piperacillin/tazobactam IVPB. 3.375 Gram(s) IV Intermittent every 8 hours  polyethylene glycol 3350 17 Gram(s) Oral daily  senna 2 Tablet(s) Oral at bedtime    MEDICATIONS  (PRN):  acetaminophen   Tablet .. 650 milliGRAM(s) Oral every 6 hours PRN Temp greater or equal to 38C (100.4F), Mild Pain (1 - 3)  guaiFENesin    Syrup 100 milliGRAM(s) Oral every 6 hours PRN Cough  HYDROcodone/homatropine Syrup 5 milliLiter(s) Oral three times a day PRN severe cough  melatonin 3 milliGRAM(s) Oral at bedtime PRN Insomnia  traMADol 25 milliGRAM(s) Oral every 6 hours PRN Moderate Pain (4 - 6)  traMADol 50 milliGRAM(s) Oral every 6 hours PRN Severe Pain (7 - 10)      Allergies    No Known Allergies    Intolerances        REVIEW OF SYSTEMS:    CONSTITUTIONAL: No weakness, fevers or chills.   EYES/ENT: No visual changes;  No vertigo or throat pain   NECK: No pain or stiffness  RESPIRATORY: No cough, wheezing, hemoptysis; No shortness of breath  CARDIOVASCULAR: No chest pain or palpitations  GASTROINTESTINAL: No abdominal or epigastric pain. No nausea, vomiting, or hematemesis; No diarrhea or constipation. No melena or hematochezia.  GENITOURINARY: No dysuria, frequency or hematuria  NEUROLOGICAL: No numbness or weakness  SKIN: No itching, burning, rashes, or lesions   All other review of systems is negative unless indicated above    Vital Signs Last 24 Hrs  T(C): 36.6 (05 Mar 2019 04:45), Max: 36.6 (04 Mar 2019 21:39)  T(F): 97.9 (05 Mar 2019 04:45), Max: 97.9 (05 Mar 2019 04:45)  HR: 75 (05 Mar 2019 04:45) (69 - 87)  BP: 106/66 (05 Mar 2019 04:45) (104/82 - 129/83)  BP(mean): 98 (04 Mar 2019 21:39) (98 - 98)  RR: 17 (05 Mar 2019 04:45) (17 - 18)  SpO2: 91% (05 Mar 2019 05:27) (91% - 94%)    I&O's Summary      PHYSICAL EXAM:  Constitutional: NAD, awake and alert, well-developed  Eyes:  EOMI,  Pupils round, No oral cyanosis.  HEENT: No exudate or erythema  Pulmonary: Decreased BS  Cardiovascular: Regular, S1 and S2, No murmurs  Gastrointestinal: Bowel Sounds present, soft, nontender.   Ext: No significant LE edema   Neurological: Alert, no gross focal motor deficits  Skin: No rashes.  Psych:  Mood & affect appropriate    LABS: All Labs Reviewed:                        11.1   5.25  )-----------( 280      ( 04 Mar 2019 07:13 )             32.1     03-03    144  |  106  |  13  ----------------------------<  95  3.4<L>   |  30  |  1.20    Ca    8.1<L>      03 Mar 2019 23:58    Blood Culture: Organism --  Gram Stain Blood -- Gram Stain --  Specimen Source .Blood Blood  Culture-Blood --    Organism --  Gram Stain Blood -- Gram Stain --  Specimen Source .Blood Blood  Culture-Blood --      · Assessment		  59M with h/o smoking, Etoh abuse, pleural effusion and hyperlipidemia for endoscopy. He appear clinically improving.    < from: TTE Echo Doppler w/o Cont (03.02.19 @ 10:59) >  Conclusion: 1. This a very technically limited study.2. There is normal left ventricular size and systolic function with an ejection fraction of 60%. 3. Views of endocardial definition are limited on this study which inhibits the interpretation of wall motion abnormalities. 4. There is mild concentric left ventricular hypertrophy. 5. The aortic root is mildly dilated at 3.9 cm. 6. There is trivial tricuspid regurgitation. 7. There is trivial regurgitation. 8. There is mitral annular calcification. 9. There is a normal diastolic flow pattern. 10. There is normal right atrial and right ventricular size and systolic   function. 11. There is no gross pericardial effusion.    Xray Chest 2 Views PA/Lat (03.02.19 @ 07:55) Findings. There is persistent right upper lobeairspace consolidation. There are low lung volumes resulting in crowding of the bronchovascular markings at the lung bases. Probable small bibasilar effusion. Cardiac and mediastinal structures are stable. Thoracic spondylosis.    Impression: Persistent right upper lobe infiltrate follow-up to resolution is suggested.    Suggest:    1. EGD as outpt per GI  2. Continue with Lipitor for hyperlipidemia  3. Advised to not resume cigarette smoking, 3-5 min.  4. Abx per pulm  5. DVT prophylaxis.    6. Will follow

## 2019-03-05 NOTE — PROGRESS NOTE ADULT - SUBJECTIVE AND OBJECTIVE BOX
Patient is a 59y old  Male who presents with a chief complaint of cough (05 Mar 2019 08:09)      INTERVAL HPI/OVERNIGHT EVENTS: Patient seen and examined. NAD. No complaints.    Vital Signs Last 24 Hrs  T(C): 36.6 (05 Mar 2019 04:45), Max: 36.6 (04 Mar 2019 21:39)  T(F): 97.9 (05 Mar 2019 04:45), Max: 97.9 (05 Mar 2019 04:45)  HR: 75 (05 Mar 2019 04:45) (69 - 87)  BP: 106/66 (05 Mar 2019 04:45) (104/82 - 129/83)  BP(mean): 98 (04 Mar 2019 21:39) (98 - 98)  RR: 17 (05 Mar 2019 04:45) (17 - 18)  SpO2: 91% (05 Mar 2019 05:27) (91% - 94%)    03-03    144  |  106  |  13  ----------------------------<  95  3.4<L>   |  30  |  1.20    Ca    8.1<L>      03 Mar 2019 23:58                            11.1   5.25  )-----------( 280      ( 04 Mar 2019 07:13 )             32.1       CAPILLARY BLOOD GLUCOSE                  acetaminophen   Tablet .. 650 milliGRAM(s) Oral every 6 hours PRN  ALBUTerol/ipratropium for Nebulization 3 milliLiter(s) Nebulizer every 6 hours  aluminum hydroxide/magnesium hydroxide/simethicone Suspension 30 milliLiter(s) Oral every 6 hours  atorvastatin 20 milliGRAM(s) Oral at bedtime  benzonatate 100 milliGRAM(s) Oral three times a day  cloZAPine 100 milliGRAM(s) Oral three times a day  docusate sodium 100 milliGRAM(s) Oral three times a day  enoxaparin Injectable 40 milliGRAM(s) SubCutaneous daily  glycerin Suppository - Adult 1 Suppository(s) Rectal at bedtime  guaiFENesin    Syrup 100 milliGRAM(s) Oral every 6 hours PRN  guaiFENesin  milliGRAM(s) Oral every 12 hours  HYDROcodone/homatropine Syrup 5 milliLiter(s) Oral three times a day PRN  influenza   Vaccine 0.5 milliLiter(s) IntraMuscular once  melatonin 3 milliGRAM(s) Oral at bedtime PRN  pantoprazole    Tablet 40 milliGRAM(s) Oral before breakfast  PARoxetine 30 milliGRAM(s) Oral daily  piperacillin/tazobactam IVPB. 3.375 Gram(s) IV Intermittent every 8 hours  polyethylene glycol 3350 17 Gram(s) Oral daily  senna 2 Tablet(s) Oral at bedtime  traMADol 25 milliGRAM(s) Oral every 6 hours PRN  traMADol 50 milliGRAM(s) Oral every 6 hours PRN              REVIEW OF SYSTEMS:  CONSTITUTIONAL: No fever, no weight loss, or no fatigue  NECK: No pain, no stiffness  RESPIRATORY: No cough, no wheezing, no chills, no hemoptysis, No shortness of breath  CARDIOVASCULAR: No chest pain, no palpitations, no dizziness, no leg swelling  GASTROINTESTINAL: No abdominal pain. No nausea, no vomiting, no hematemesis; No diarrhea, no constipation. No melena, no hematochezia.  GENITOURINARY: No dysuria, no frequency, no hematuria, no incontinence  NEUROLOGICAL: No headaches, no loss of strength, no numbness, no tremors  SKIN: No itching, no burning  MUSCULOSKELETAL: No joint pain, no swelling; No muscle, no back, no extremity pain  PSYCHIATRIC: No depression, no mood swings,   HEME/LYMPH: No easy bruising, no bleeding gums  ALLERY AND IMMUNOLOGIC: No hives       Consultant(s) Notes Reviewed:  [X] YES  [ ] NO    PHYSICAL EXAM:  GENERAL: NAD  HEAD:  Atraumatic, Normocephalic  EYES: EOMI, PERRLA, conjunctiva and sclera clear  ENMT: No tonsillar erythema, exudates, or enlargement; Moist mucous membranes  NECK: Supple, No JVD  NERVOUS SYSTEM:  Awake & alert  CHEST/LUNG: Clear to auscultation bilaterally; No rales, rhonchi, wheezing,  HEART: Regular rate and rhythm  ABDOMEN: Soft, Nontender, Nondistended; Bowel sounds present  EXTREMITIES:  No clubbing, cyanosis, or edema  LYMPH: No lymphadenopathy noted  SKIN: No rashes      Advanced care planning discussed with patient/family [X] YES   [ ] NO    Advanced care planning discussed with patient/family. Advanced care planning forms reviewed/discussed/completed. 20 minutes spent.

## 2019-10-31 ENCOUNTER — INPATIENT (INPATIENT)
Facility: HOSPITAL | Age: 59
LOS: 5 days | Discharge: HOME | DRG: 682 | End: 2019-11-06
Attending: INTERNAL MEDICINE | Admitting: INTERNAL MEDICINE
Payer: MEDICARE

## 2019-10-31 VITALS
TEMPERATURE: 100 F | SYSTOLIC BLOOD PRESSURE: 112 MMHG | DIASTOLIC BLOOD PRESSURE: 66 MMHG | HEART RATE: 92 BPM | OXYGEN SATURATION: 96 % | WEIGHT: 240.08 LBS | RESPIRATION RATE: 18 BRPM

## 2019-10-31 DIAGNOSIS — E78.5 HYPERLIPIDEMIA, UNSPECIFIED: ICD-10-CM

## 2019-10-31 DIAGNOSIS — I10 ESSENTIAL (PRIMARY) HYPERTENSION: ICD-10-CM

## 2019-10-31 DIAGNOSIS — J18.9 PNEUMONIA, UNSPECIFIED ORGANISM: ICD-10-CM

## 2019-10-31 DIAGNOSIS — F20.9 SCHIZOPHRENIA, UNSPECIFIED: ICD-10-CM

## 2019-10-31 LAB
ALBUMIN SERPL ELPH-MCNC: 3.7 G/DL — SIGNIFICANT CHANGE UP (ref 3.3–5)
ALP SERPL-CCNC: 105 U/L — SIGNIFICANT CHANGE UP (ref 40–120)
ALT FLD-CCNC: 18 U/L — SIGNIFICANT CHANGE UP (ref 12–78)
ANION GAP SERPL CALC-SCNC: 12 MMOL/L — SIGNIFICANT CHANGE UP (ref 5–17)
APPEARANCE UR: CLEAR — SIGNIFICANT CHANGE UP
AST SERPL-CCNC: 19 U/L — SIGNIFICANT CHANGE UP (ref 15–37)
BACTERIA # UR AUTO: ABNORMAL
BASE EXCESS BLDV CALC-SCNC: -1.1 MMOL/L — SIGNIFICANT CHANGE UP (ref -2–2)
BASOPHILS # BLD AUTO: 0.03 K/UL — SIGNIFICANT CHANGE UP (ref 0–0.2)
BASOPHILS NFR BLD AUTO: 0.2 % — SIGNIFICANT CHANGE UP (ref 0–2)
BILIRUB SERPL-MCNC: 1.6 MG/DL — HIGH (ref 0.2–1.2)
BILIRUB UR-MCNC: NEGATIVE — SIGNIFICANT CHANGE UP
BLOOD GAS COMMENTS, VENOUS: SIGNIFICANT CHANGE UP
BLOOD GAS COMMENTS, VENOUS: SIGNIFICANT CHANGE UP
BUN SERPL-MCNC: 22 MG/DL — SIGNIFICANT CHANGE UP (ref 7–23)
CALCIUM SERPL-MCNC: 8.9 MG/DL — SIGNIFICANT CHANGE UP (ref 8.5–10.1)
CHLORIDE SERPL-SCNC: 104 MMOL/L — SIGNIFICANT CHANGE UP (ref 96–108)
CO2 SERPL-SCNC: 22 MMOL/L — SIGNIFICANT CHANGE UP (ref 22–31)
COLOR SPEC: YELLOW — SIGNIFICANT CHANGE UP
COMMENT - URINE: SIGNIFICANT CHANGE UP
CREAT SERPL-MCNC: 1.8 MG/DL — HIGH (ref 0.5–1.3)
DIFF PNL FLD: NEGATIVE — SIGNIFICANT CHANGE UP
EOSINOPHIL # BLD AUTO: 0.01 K/UL — SIGNIFICANT CHANGE UP (ref 0–0.5)
EOSINOPHIL NFR BLD AUTO: 0.1 % — SIGNIFICANT CHANGE UP (ref 0–6)
EPI CELLS # UR: SIGNIFICANT CHANGE UP
FLU A RESULT: SIGNIFICANT CHANGE UP
FLU A RESULT: SIGNIFICANT CHANGE UP
FLUAV AG NPH QL: SIGNIFICANT CHANGE UP
FLUBV AG NPH QL: SIGNIFICANT CHANGE UP
GLUCOSE SERPL-MCNC: 100 MG/DL — HIGH (ref 70–99)
GLUCOSE UR QL: NEGATIVE — SIGNIFICANT CHANGE UP
GRAN CASTS # UR COMP ASSIST: ABNORMAL /LPF
HCO3 BLDV-SCNC: 24 MMOL/L — SIGNIFICANT CHANGE UP (ref 21–29)
HCT VFR BLD CALC: 38.4 % — LOW (ref 39–50)
HGB BLD-MCNC: 13 G/DL — SIGNIFICANT CHANGE UP (ref 13–17)
HOROWITZ INDEX BLDV+IHG-RTO: 21 — SIGNIFICANT CHANGE UP
HYALINE CASTS # UR AUTO: ABNORMAL /LPF
IMM GRANULOCYTES NFR BLD AUTO: 0.6 % — SIGNIFICANT CHANGE UP (ref 0–1.5)
KETONES UR-MCNC: ABNORMAL
LACTATE SERPL-SCNC: 1.5 MMOL/L — SIGNIFICANT CHANGE UP (ref 0.7–2)
LEUKOCYTE ESTERASE UR-ACNC: NEGATIVE — SIGNIFICANT CHANGE UP
LYMPHOCYTES # BLD AUTO: 0.79 K/UL — LOW (ref 1–3.3)
LYMPHOCYTES # BLD AUTO: 6.4 % — LOW (ref 13–44)
MCHC RBC-ENTMCNC: 29.8 PG — SIGNIFICANT CHANGE UP (ref 27–34)
MCHC RBC-ENTMCNC: 33.9 GM/DL — SIGNIFICANT CHANGE UP (ref 32–36)
MCV RBC AUTO: 88.1 FL — SIGNIFICANT CHANGE UP (ref 80–100)
MONOCYTES # BLD AUTO: 1.31 K/UL — HIGH (ref 0–0.9)
MONOCYTES NFR BLD AUTO: 10.7 % — SIGNIFICANT CHANGE UP (ref 2–14)
NEUTROPHILS # BLD AUTO: 10.09 K/UL — HIGH (ref 1.8–7.4)
NEUTROPHILS NFR BLD AUTO: 82 % — HIGH (ref 43–77)
NITRITE UR-MCNC: NEGATIVE — SIGNIFICANT CHANGE UP
NRBC # BLD: 0 /100 WBCS — SIGNIFICANT CHANGE UP (ref 0–0)
PCO2 BLDV: 34 MMHG — LOW (ref 35–50)
PH BLDV: 7.44 — SIGNIFICANT CHANGE UP (ref 7.35–7.45)
PH UR: 5 — SIGNIFICANT CHANGE UP (ref 5–8)
PLATELET # BLD AUTO: 215 K/UL — SIGNIFICANT CHANGE UP (ref 150–400)
PO2 BLDV: <50 MMHG — HIGH (ref 25–45)
POTASSIUM SERPL-MCNC: 3.8 MMOL/L — SIGNIFICANT CHANGE UP (ref 3.5–5.3)
POTASSIUM SERPL-SCNC: 3.8 MMOL/L — SIGNIFICANT CHANGE UP (ref 3.5–5.3)
PROT SERPL-MCNC: 8.2 G/DL — SIGNIFICANT CHANGE UP (ref 6–8.3)
PROT UR-MCNC: 25 MG/DL
RBC # BLD: 4.36 M/UL — SIGNIFICANT CHANGE UP (ref 4.2–5.8)
RBC # FLD: 13.1 % — SIGNIFICANT CHANGE UP (ref 10.3–14.5)
RBC CASTS # UR COMP ASSIST: SIGNIFICANT CHANGE UP /HPF (ref 0–4)
RSV RESULT: SIGNIFICANT CHANGE UP
RSV RNA RESP QL NAA+PROBE: SIGNIFICANT CHANGE UP
SAO2 % BLDV: 77 % — SIGNIFICANT CHANGE UP (ref 67–88)
SODIUM SERPL-SCNC: 138 MMOL/L — SIGNIFICANT CHANGE UP (ref 135–145)
SP GR SPEC: 1.01 — SIGNIFICANT CHANGE UP (ref 1.01–1.02)
TSH SERPL-MCNC: 0.77 UIU/ML — SIGNIFICANT CHANGE UP (ref 0.36–3.74)
UROBILINOGEN FLD QL: NEGATIVE — SIGNIFICANT CHANGE UP
WBC # BLD: 12.3 K/UL — HIGH (ref 3.8–10.5)
WBC # FLD AUTO: 12.3 K/UL — HIGH (ref 3.8–10.5)
WBC UR QL: SIGNIFICANT CHANGE UP

## 2019-10-31 PROCEDURE — 99285 EMERGENCY DEPT VISIT HI MDM: CPT

## 2019-10-31 PROCEDURE — 71046 X-RAY EXAM CHEST 2 VIEWS: CPT | Mod: 26

## 2019-10-31 PROCEDURE — 93010 ELECTROCARDIOGRAM REPORT: CPT

## 2019-10-31 RX ORDER — LANOLIN ALCOHOL/MO/W.PET/CERES
3 CREAM (GRAM) TOPICAL AT BEDTIME
Refills: 0 | Status: DISCONTINUED | OUTPATIENT
Start: 2019-10-31 | End: 2019-11-06

## 2019-10-31 RX ORDER — VERAPAMIL HCL 240 MG
120 CAPSULE, EXTENDED RELEASE PELLETS 24 HR ORAL THREE TIMES A DAY
Refills: 0 | Status: DISCONTINUED | OUTPATIENT
Start: 2019-10-31 | End: 2019-10-31

## 2019-10-31 RX ORDER — SODIUM CHLORIDE 9 MG/ML
1000 INJECTION INTRAMUSCULAR; INTRAVENOUS; SUBCUTANEOUS ONCE
Refills: 0 | Status: COMPLETED | OUTPATIENT
Start: 2019-10-31 | End: 2019-10-31

## 2019-10-31 RX ORDER — VERAPAMIL HCL 240 MG
120 CAPSULE, EXTENDED RELEASE PELLETS 24 HR ORAL DAILY
Refills: 0 | Status: DISCONTINUED | OUTPATIENT
Start: 2019-10-31 | End: 2019-11-03

## 2019-10-31 RX ORDER — VERAPAMIL HCL 240 MG
1 CAPSULE, EXTENDED RELEASE PELLETS 24 HR ORAL
Qty: 0 | Refills: 0 | DISCHARGE

## 2019-10-31 RX ORDER — CEFTRIAXONE 500 MG/1
1000 INJECTION, POWDER, FOR SOLUTION INTRAMUSCULAR; INTRAVENOUS EVERY 24 HOURS
Refills: 0 | Status: COMPLETED | OUTPATIENT
Start: 2019-11-01 | End: 2019-11-05

## 2019-10-31 RX ORDER — CLOZAPINE 150 MG/1
200 TABLET, ORALLY DISINTEGRATING ORAL AT BEDTIME
Refills: 0 | Status: DISCONTINUED | OUTPATIENT
Start: 2019-10-31 | End: 2019-11-06

## 2019-10-31 RX ORDER — ACETAMINOPHEN 500 MG
650 TABLET ORAL EVERY 6 HOURS
Refills: 0 | Status: DISCONTINUED | OUTPATIENT
Start: 2019-10-31 | End: 2019-11-06

## 2019-10-31 RX ORDER — ATORVASTATIN CALCIUM 80 MG/1
20 TABLET, FILM COATED ORAL AT BEDTIME
Refills: 0 | Status: DISCONTINUED | OUTPATIENT
Start: 2019-10-31 | End: 2019-11-06

## 2019-10-31 RX ORDER — SODIUM CHLORIDE 9 MG/ML
1000 INJECTION INTRAMUSCULAR; INTRAVENOUS; SUBCUTANEOUS
Refills: 0 | Status: DISCONTINUED | OUTPATIENT
Start: 2019-10-31 | End: 2019-11-01

## 2019-10-31 RX ORDER — IPRATROPIUM/ALBUTEROL SULFATE 18-103MCG
3 AEROSOL WITH ADAPTER (GRAM) INHALATION ONCE
Refills: 0 | Status: COMPLETED | OUTPATIENT
Start: 2019-10-31 | End: 2019-10-31

## 2019-10-31 RX ORDER — CLONAZEPAM 1 MG
1 TABLET ORAL
Qty: 0 | Refills: 0 | DISCHARGE

## 2019-10-31 RX ORDER — CLONAZEPAM 1 MG
2 TABLET ORAL AT BEDTIME
Refills: 0 | Status: DISCONTINUED | OUTPATIENT
Start: 2019-10-31 | End: 2019-11-06

## 2019-10-31 RX ORDER — IBUPROFEN 200 MG
600 TABLET ORAL ONCE
Refills: 0 | Status: COMPLETED | OUTPATIENT
Start: 2019-10-31 | End: 2019-10-31

## 2019-10-31 RX ORDER — AZITHROMYCIN 500 MG/1
500 TABLET, FILM COATED ORAL ONCE
Refills: 0 | Status: COMPLETED | OUTPATIENT
Start: 2019-10-31 | End: 2019-10-31

## 2019-10-31 RX ORDER — AZITHROMYCIN 500 MG/1
500 TABLET, FILM COATED ORAL EVERY 24 HOURS
Refills: 0 | Status: COMPLETED | OUTPATIENT
Start: 2019-11-01 | End: 2019-11-04

## 2019-10-31 RX ORDER — CLONAZEPAM 1 MG
1 TABLET ORAL THREE TIMES A DAY
Refills: 0 | Status: DISCONTINUED | OUTPATIENT
Start: 2019-10-31 | End: 2019-10-31

## 2019-10-31 RX ORDER — PANTOPRAZOLE SODIUM 20 MG/1
40 TABLET, DELAYED RELEASE ORAL
Refills: 0 | Status: DISCONTINUED | OUTPATIENT
Start: 2019-10-31 | End: 2019-11-06

## 2019-10-31 RX ORDER — CLOZAPINE 150 MG/1
1 TABLET, ORALLY DISINTEGRATING ORAL
Qty: 0 | Refills: 0 | DISCHARGE

## 2019-10-31 RX ORDER — CEFTRIAXONE 500 MG/1
1000 INJECTION, POWDER, FOR SOLUTION INTRAMUSCULAR; INTRAVENOUS ONCE
Refills: 0 | Status: COMPLETED | OUTPATIENT
Start: 2019-10-31 | End: 2019-10-31

## 2019-10-31 RX ORDER — CLOZAPINE 150 MG/1
100 TABLET, ORALLY DISINTEGRATING ORAL THREE TIMES A DAY
Refills: 0 | Status: DISCONTINUED | OUTPATIENT
Start: 2019-10-31 | End: 2019-10-31

## 2019-10-31 RX ORDER — FLUTICASONE PROPIONATE 50 MCG
2 SPRAY, SUSPENSION NASAL DAILY
Refills: 0 | Status: DISCONTINUED | OUTPATIENT
Start: 2019-10-31 | End: 2019-11-06

## 2019-10-31 RX ADMIN — SODIUM CHLORIDE 1000 MILLILITER(S): 9 INJECTION INTRAMUSCULAR; INTRAVENOUS; SUBCUTANEOUS at 12:39

## 2019-10-31 RX ADMIN — Medication 3 MILLIGRAM(S): at 22:16

## 2019-10-31 RX ADMIN — SODIUM CHLORIDE 1000 MILLILITER(S): 9 INJECTION INTRAMUSCULAR; INTRAVENOUS; SUBCUTANEOUS at 10:55

## 2019-10-31 RX ADMIN — CEFTRIAXONE 1000 MILLIGRAM(S): 500 INJECTION, POWDER, FOR SOLUTION INTRAMUSCULAR; INTRAVENOUS at 11:22

## 2019-10-31 RX ADMIN — AZITHROMYCIN 500 MILLIGRAM(S): 500 TABLET, FILM COATED ORAL at 12:39

## 2019-10-31 RX ADMIN — Medication 30 MILLIGRAM(S): at 21:05

## 2019-10-31 RX ADMIN — Medication 600 MILLIGRAM(S): at 10:55

## 2019-10-31 RX ADMIN — SODIUM CHLORIDE 100 MILLILITER(S): 9 INJECTION INTRAMUSCULAR; INTRAVENOUS; SUBCUTANEOUS at 12:39

## 2019-10-31 RX ADMIN — CLOZAPINE 200 MILLIGRAM(S): 150 TABLET, ORALLY DISINTEGRATING ORAL at 21:05

## 2019-10-31 RX ADMIN — CEFTRIAXONE 100 MILLIGRAM(S): 500 INJECTION, POWDER, FOR SOLUTION INTRAMUSCULAR; INTRAVENOUS at 11:01

## 2019-10-31 RX ADMIN — SODIUM CHLORIDE 1000 MILLILITER(S): 9 INJECTION INTRAMUSCULAR; INTRAVENOUS; SUBCUTANEOUS at 09:59

## 2019-10-31 RX ADMIN — AZITHROMYCIN 255 MILLIGRAM(S): 500 TABLET, FILM COATED ORAL at 11:21

## 2019-10-31 RX ADMIN — Medication 600 MILLIGRAM(S): at 10:15

## 2019-10-31 RX ADMIN — Medication 3 MILLILITER(S): at 10:23

## 2019-10-31 RX ADMIN — ATORVASTATIN CALCIUM 20 MILLIGRAM(S): 80 TABLET, FILM COATED ORAL at 21:05

## 2019-10-31 RX ADMIN — Medication 2 MILLIGRAM(S): at 21:05

## 2019-10-31 NOTE — SWALLOW BEDSIDE ASSESSMENT ADULT - COMMENTS
Consult received and chart reviewed. The patient is a "60 y/o male who presents to the ED for cough, fever, nausea x1 week. He reports tmax of 104 and describes his cough as dry and unproductive. The patient was seen by his PMD 1 week ago and was sent home with nasal decongestants. The patient has been taking advil with minimal relief. The patient reports that his symptoms feel similar to last hospitalization with PNA in february/march of 2019." Recent CXR revealed, "Prior infiltrate in the right lung has resolved. There is a new, recurrent airspace infiltrate in the right upper lobe consistent with pneumonia. No pleural collection. No change heart mediastinum." Discussed results and recommendations from this evaluation with the patient, patient's parents, RN, and call out to MD. Consult received and chart reviewed. The patient is denying any swallowing difficulties at this time. Patient's parents present at bedside throughout this evaluation. The patient is a "58 y/o male who presents to the ED for cough, fever, nausea x1 week. He reports tmax of 104 and describes his cough as dry and unproductive. The patient was seen by his PMD 1 week ago and was sent home with nasal decongestants. The patient has been taking advil with minimal relief. The patient reports that his symptoms feel similar to last hospitalization with PNA in february/march of 2019." Recent CXR revealed, "Prior infiltrate in the right lung has resolved. There is a new, recurrent airspace infiltrate in the right upper lobe consistent with pneumonia. No pleural collection. No change heart mediastinum." Discussed results and recommendations from this evaluation with the patient, patient's parents, RN, and call out to MD.

## 2019-10-31 NOTE — H&P ADULT - NSICDXPASTMEDICALHX_GEN_ALL_CORE_FT
PAST MEDICAL HISTORY:  Drug abuse, in remission sober x 20 yrs    H/O ETOH abuse sober x 20 yrs    H/O pleural effusion DX IN 2011 when pt had pneumonia  chronic condition now    HLD (hyperlipidemia)     Migraines     Obesity     Pneumonia 2011    Schizophrenia pt never stated he had schizophrenia during interview but is on medication for it.    Smoking hx

## 2019-10-31 NOTE — SWALLOW BEDSIDE ASSESSMENT ADULT - ASR SWALLOW ASPIRATION MONITOR
change of breathing pattern/pneumonia/throat clearing/upper respiratory infection/position upright (90Y)/cough/fever/oral hygiene/gurgly voice

## 2019-10-31 NOTE — CONSULT NOTE ADULT - SUBJECTIVE AND OBJECTIVE BOX
Date/Time Patient Seen:  		  Referring MD:   Data Reviewed	       Patient is a 59y old  Male who presents with a chief complaint of fever and not feeling well with cough (31 Oct 2019 12:51)      Subjective/HPI    in bed  seen and examined  vs and meds reviewed    H and P reviewed  ER provider note reviewed  labs reviewed    cxr reviewed    recurrent PNA    sees and follows with Dr. Guerra - ? schedule for bronchoscopy -     non smoker  non drinker  lives alone    60yo male who presents to the ED for cough, fever, nausea x1 week. He reports tmax of 104 and describes his cough as dry and unproductive. The patient was seen by his PMD 1 week ago and was sent home with nasal decongestants. The patient has been taking advil with minimal relief. The patient reports that his symptoms feel similar to last hospitalization with PNA in february/march of 2019.    PAST SURGICAL HISTORY:  Cyst scalp excised 20 yrs ago    Skin lesion face   "pre-cancerous".     FAMILY HISTORY:  No pertinent family history in first degree relatives.     No Pertinent Family History in first degree relatives of: na.     Social History:  Social History (marital status, living situation, occupation, tobacco use, alcohol and drug use, and sexual history): lives at home  	former smoker  no etoh     Tobacco Screening:  · Core Measure Site	Yes  · Has the patient used tobacco in the past 30 days?	No    Risk Assessment:    Present on Admission:  Deep Venous Thrombosis	no  Pulmonary Embolus	no     Heart Failure:  Does this patient have a history of or has been diagnosed with heart failure? no.    HIV Screen (per [facility] Department of Health, HIV screening must be offered to every individual between ages 13 and 64)	Unable to offer due to clinical condition       PAST MEDICAL & SURGICAL HISTORY:  H/O pleural effusion: DX IN 2011 when pt had pneumonia  chronic condition now  CIRILO (obstructive sleep apnea)  Obesity  Migraines  Pneumonia: 2011  Smoking hx  H/O ETOH abuse: sober x 20 yrs  Drug abuse, in remission: sober x 20 yrs  HLD (hyperlipidemia)  Schizophrenia: pt never stated he had schizophrenia during interview but is on medication for it.  Skin lesion: face   &quot;pre-cancerous&quot;  Cyst: scalp excised 20 yrs ago        Medication list         MEDICATIONS  (STANDING):  atorvastatin 20 milliGRAM(s) Oral at bedtime  azithromycin  IVPB 500 milliGRAM(s) IV Intermittent every 24 hours  cefTRIAXone   IVPB 1000 milliGRAM(s) IV Intermittent every 24 hours  clonazePAM  Tablet 2 milliGRAM(s) Oral at bedtime  cloZAPine 200 milliGRAM(s) Oral at bedtime  fluticasone propionate 50 MICROgram(s)/spray Nasal Spray 2 Spray(s) Both Nostrils daily  pantoprazole    Tablet 40 milliGRAM(s) Oral before breakfast  PARoxetine 30 milliGRAM(s) Oral two times a day  sodium chloride 0.9%. 1000 milliLiter(s) (100 mL/Hr) IV Continuous <Continuous>  verapamil  milliGRAM(s) Oral daily    MEDICATIONS  (PRN):  acetaminophen   Tablet .. 650 milliGRAM(s) Oral every 6 hours PRN Temp greater or equal to 38C (100.4F), Moderate Pain (4 - 6)         Vitals log        ICU Vital Signs Last 24 Hrs  T(C): 36.7 (31 Oct 2019 16:54), Max: 37.7 (31 Oct 2019 11:11)  T(F): 98 (31 Oct 2019 16:54), Max: 99.8 (31 Oct 2019 11:11)  HR: 68 (31 Oct 2019 16:54) (68 - 92)  BP: 104/62 (31 Oct 2019 16:54) (104/62 - 112/66)  BP(mean): --  ABP: --  ABP(mean): --  RR: 16 (31 Oct 2019 16:54) (16 - 18)  SpO2: 95% (31 Oct 2019 16:54) (95% - 98%)           Input and Output:  I&O's Detail      Lab Data                        13.0   12.30 )-----------( 215      ( 31 Oct 2019 10:01 )             38.4     10-31    138  |  104  |  22  ----------------------------<  100<H>  3.8   |  22  |  1.80<H>    Ca    8.9      31 Oct 2019 10:01    TPro  8.2  /  Alb  3.7  /  TBili  1.6<H>  /  DBili  x   /  AST  19  /  ALT  18  /  AlkPhos  105  10-31            Review of Systems	  fever      Objective     Physical Examination      heart s1s2  lung dec BS  abd soft    Pertinent Lab findings & Imaging      Navarrete:  NO   Adequate UO     I&O's Detail           Discussed with:     Cultures:	        Radiology          EXAM:  XR CHEST PA LAT 2V                            PROCEDURE DATE:  10/31/2019          INTERPRETATION:  Cough, fever.    PA lateral. Prior 3/2/2019.    Prior infiltrate in the right lung has resolved. There is a new,   recurrent airspace infiltrate in the right upper lobe consistent with   pneumonia. No pleural collection. No change heart mediastinum.    Impression: As above                PRAMOD WINSLOW M.D., ATTENDING RADIOLOGIST  This document has been electronically signed. Oct 31 2019 10:54AM               EXAM:  ECHO TTE WO CON COMP W DOPPLR         PROCEDURE DATE:  03/02/2019        INTERPRETATION:  INDICATION: Shortness of breath    Conclusion:   1. This a very technically limited study.  2. There is normal left ventricular size and systolic function with an   ejection fraction of 60%.  3. Views of endocardial definition are limited on this study which   inhibits the interpretation of wall motion abnormalities.  4. There is mild concentric left ventricular hypertrophy.  5. The aortic root is mildly dilated at 3.9 cm.  6. There is trivial tricuspid regurgitation.  7. There is trivial regurgitation.  8. There is mitral annular calcification.  9. There is a normal diastolic flow pattern.  10. There is normal right atrial and right ventricular size and systolic   function.  11. There is no gross pericardial effusion.    Blood Pressure 112/73 mmHg         BSA 2.22 sq m    Dimensions:    LA 3.6 cm       Normal Values: 2.0 - 4.0 cm    Ao 3.9 cm        Normal Values: 2.0 - 3.8 cm  IVSd 1.3 cm       Normal Values: 0.6 - 1.2 cm  PWd 1.1 cm       Normal Values: 0.6 - 1.1 cm  LVIDd 4.8 cm         Normal Values: 3.0 - 5.6 cm  LVIDs 3.9 cm         Normal Values: 1.8 - 4.0 cm                    KATIE SALAZAR M.D., ATTENDING CARDIOLOGIST  This document has been electronically signed. Mar  3 2019  7:25AM            EXAM:  CT CHEST                            PROCEDURE DATE:  02/26/2019          INTERPRETATION:  History: Sepsis    CT chest abdomen and pelvis only oral contrast.  There is extensive right upper lobe consolidation with air bronchogram   abutting the ipsilateral hilum and costal pleural surfaces consistent   with pneumonia. There is mild volume loss suggesting atelectatic   component. These follow to resolution.  Trace right pleural effusion.  Central airways patent. Scattered subcentimeter mediastinal lymph nodes.   Lack of IV contrast precludes accurate evaluation for hilar adenopathy.   Normal caliber thoracic aorta.  Normal heart size. No pericardial abnormality.  Chest wall intact. Severe bilateral gynecomastia.  No radiodense gallstones or biliary dilatation. Unenhanced liver pancreas   not remarkable. Spleen is moderately enlarged.  No adrenal nodules  No hydronephrosis or urolithiasis. Bilateral perirenal stranding is   nonspecific could be chronic can also be a manifestation of UTI. Please   correlate. There is an exophytic 3.4 cm left renal cortical cyst.  Normal caliber abdominal aorta.  No suspicious retroperitoneal adenopathy.  No evidence appendicitis actively inflamed or obstructed bowel. Moderate   retained colonic stool. No extraluminal fluid or gas.  Normal size prostate with calcification. Bladder not remarkable.  No acute or aggressive osseous abnormality.    Impression:    Limited by lack of IV contrast.  Extensive consolidative pneumonia right upper lobe. Please follow to   resolution to exclude underlying occult lesion. Trace right pleural   effusion.  Moderate splenomegaly.  Nonspecific perirenal stranding. Correlate for potential UTI                PRAMOD WINSLOW M.D., ATTENDING RADIOLOGIST  This document has been electronically signed. Feb 26 2019  3:08PM

## 2019-10-31 NOTE — ED PROVIDER NOTE - ATTENDING CONTRIBUTION TO CARE
I have personally performed a face to face bedside history and physical examination of this patient. I have discussed the history, examination, review of systems, assessment and plan of management with the medical student. I have reviewed the electronic medical record and amended it to reflect my history, review of systems, physical exam, assessment and plan. Patient c/o cough, mild productive sputum, fever, tmax 104, for past 7-10 days, seen by PMD today sent to ER for admission, patient alert and oriented, heart and lungs clear, abdomen soft, no pedal edema, f/u labs, chest xray, ekg, iv fluids, antibtiocs, admit.

## 2019-10-31 NOTE — ED PROVIDER NOTE - OBJECTIVE STATEMENT
Patient is a 58yo male who presents to the ED for cough, fever, nausea x1 week. He reports tmax of 104 and describes his cough as dry and unproductive. The patient was seen by his PMD 1 week ago and was sent home with nasal decongestants. The patient has been taking advil with minimal relief. The patient reports that his symptoms feel similar to last hospitalization with PNA in february/march of 2019.

## 2019-10-31 NOTE — H&P ADULT - PROBLEM SELECTOR PLAN 1
pt with recurrent pna  pulm eval called  speech and swallow eval  iv rocephin and zithromas  trend labs  fu cultures sputum and blood  may need bronch on this admission - agreeable this time

## 2019-10-31 NOTE — SWALLOW BEDSIDE ASSESSMENT ADULT - SWALLOW EVAL: DIAGNOSIS
1. The patient demonstrated functional oral management of puree and thin liquid textures marked by adequate bolus collection, transfer, and posterior transport. 2. The patient demonstrated a mild oral dysphagia for solids marked by prolonged oral manipulation and decreased mastication abilities likely 2/2 absent upper dentition and sparse lower dentition resulting in delayed bolus collection, transfer, and posterior transport. 3. The patient demonstrated a mild pharyngeal dysphagia for puree, solid, and thin liquid textures marked by a delayed pharyngeal swallow trigger with reduced hyolaryngeal elevation upon digital palpation w/o evidence of airway penetration.

## 2019-10-31 NOTE — CONSULT NOTE ADULT - PROBLEM SELECTOR RECOMMENDATION 9
recurrent pna  work up in progress  rocephin and zithro   last pna episode - 3 months ago  swallow eval noted  pt has poor dentition and is on psych rx regimen - at risk for aspiration  keep HOB elev  asp prec  oral hygiene  tylenol prn for fever  cx pending  sputum cx  ct chest from old records reviewed  will contact PMD pulm for ? re - Bronchoscopy -   will follow  pt is on room air - no resp distress at the moment  will check TSH and VBG  suspect CIRILO - will need outpatient PSG

## 2019-11-01 ENCOUNTER — OUTPATIENT (OUTPATIENT)
Dept: OUTPATIENT SERVICES | Facility: HOSPITAL | Age: 59
LOS: 1 days | End: 2019-11-01

## 2019-11-01 LAB
ANION GAP SERPL CALC-SCNC: 9 MMOL/L — SIGNIFICANT CHANGE UP (ref 5–17)
BUN SERPL-MCNC: 16 MG/DL — SIGNIFICANT CHANGE UP (ref 7–23)
CALCIUM SERPL-MCNC: 8 MG/DL — LOW (ref 8.5–10.1)
CHLORIDE SERPL-SCNC: 113 MMOL/L — HIGH (ref 96–108)
CO2 SERPL-SCNC: 21 MMOL/L — LOW (ref 22–31)
CREAT SERPL-MCNC: 1.1 MG/DL — SIGNIFICANT CHANGE UP (ref 0.5–1.3)
CULTURE RESULTS: NO GROWTH — SIGNIFICANT CHANGE UP
GLUCOSE SERPL-MCNC: 84 MG/DL — SIGNIFICANT CHANGE UP (ref 70–99)
HCT VFR BLD CALC: 31.5 % — LOW (ref 39–50)
HGB BLD-MCNC: 10.7 G/DL — LOW (ref 13–17)
MCHC RBC-ENTMCNC: 29.7 PG — SIGNIFICANT CHANGE UP (ref 27–34)
MCHC RBC-ENTMCNC: 34 GM/DL — SIGNIFICANT CHANGE UP (ref 32–36)
MCV RBC AUTO: 87.5 FL — SIGNIFICANT CHANGE UP (ref 80–100)
NRBC # BLD: 0 /100 WBCS — SIGNIFICANT CHANGE UP (ref 0–0)
PLATELET # BLD AUTO: 179 K/UL — SIGNIFICANT CHANGE UP (ref 150–400)
POTASSIUM SERPL-MCNC: 3.7 MMOL/L — SIGNIFICANT CHANGE UP (ref 3.5–5.3)
POTASSIUM SERPL-SCNC: 3.7 MMOL/L — SIGNIFICANT CHANGE UP (ref 3.5–5.3)
RBC # BLD: 3.6 M/UL — LOW (ref 4.2–5.8)
RBC # FLD: 13.2 % — SIGNIFICANT CHANGE UP (ref 10.3–14.5)
SODIUM SERPL-SCNC: 143 MMOL/L — SIGNIFICANT CHANGE UP (ref 135–145)
SPECIMEN SOURCE: SIGNIFICANT CHANGE UP
WBC # BLD: 6.43 K/UL — SIGNIFICANT CHANGE UP (ref 3.8–10.5)
WBC # FLD AUTO: 6.43 K/UL — SIGNIFICANT CHANGE UP (ref 3.8–10.5)

## 2019-11-01 RX ORDER — ENOXAPARIN SODIUM 100 MG/ML
40 INJECTION SUBCUTANEOUS DAILY
Refills: 0 | Status: DISCONTINUED | OUTPATIENT
Start: 2019-11-01 | End: 2019-11-06

## 2019-11-01 RX ADMIN — SODIUM CHLORIDE 100 MILLILITER(S): 9 INJECTION INTRAMUSCULAR; INTRAVENOUS; SUBCUTANEOUS at 00:39

## 2019-11-01 RX ADMIN — AZITHROMYCIN 255 MILLIGRAM(S): 500 TABLET, FILM COATED ORAL at 13:23

## 2019-11-01 RX ADMIN — Medication 2 MILLIGRAM(S): at 21:11

## 2019-11-01 RX ADMIN — PANTOPRAZOLE SODIUM 40 MILLIGRAM(S): 20 TABLET, DELAYED RELEASE ORAL at 05:29

## 2019-11-01 RX ADMIN — Medication 3 MILLIGRAM(S): at 21:11

## 2019-11-01 RX ADMIN — Medication 100 MILLIGRAM(S): at 21:11

## 2019-11-01 RX ADMIN — SODIUM CHLORIDE 100 MILLILITER(S): 9 INJECTION INTRAMUSCULAR; INTRAVENOUS; SUBCUTANEOUS at 09:17

## 2019-11-01 RX ADMIN — CEFTRIAXONE 100 MILLIGRAM(S): 500 INJECTION, POWDER, FOR SOLUTION INTRAMUSCULAR; INTRAVENOUS at 12:27

## 2019-11-01 RX ADMIN — CLOZAPINE 200 MILLIGRAM(S): 150 TABLET, ORALLY DISINTEGRATING ORAL at 21:11

## 2019-11-01 RX ADMIN — Medication 100 MILLIGRAM(S): at 12:33

## 2019-11-01 RX ADMIN — Medication 2 SPRAY(S): at 11:54

## 2019-11-01 RX ADMIN — Medication 200 MILLIGRAM(S): at 08:54

## 2019-11-01 RX ADMIN — Medication 30 MILLIGRAM(S): at 05:29

## 2019-11-01 RX ADMIN — Medication 30 MILLIGRAM(S): at 17:11

## 2019-11-01 RX ADMIN — ATORVASTATIN CALCIUM 20 MILLIGRAM(S): 80 TABLET, FILM COATED ORAL at 21:11

## 2019-11-01 RX ADMIN — ENOXAPARIN SODIUM 40 MILLIGRAM(S): 100 INJECTION SUBCUTANEOUS at 11:54

## 2019-11-01 RX ADMIN — Medication 120 MILLIGRAM(S): at 05:29

## 2019-11-01 NOTE — PROGRESS NOTE ADULT - SUBJECTIVE AND OBJECTIVE BOX
Date/Time Patient Seen:  		  Referring MD:   Data Reviewed	       Patient is a 59y old  Male who presents with a chief complaint of fever and not feeling well with cough (31 Oct 2019 17:12)      Subjective/HPI     PAST MEDICAL & SURGICAL HISTORY:  H/O pleural effusion: DX IN 2011 when pt had pneumonia  chronic condition now  CIRILO (obstructive sleep apnea)  Obesity  Migraines  Pneumonia: 2011  Smoking hx  H/O ETOH abuse: sober x 20 yrs  Drug abuse, in remission: sober x 20 yrs  HLD (hyperlipidemia)  Schizophrenia: pt never stated he had schizophrenia during interview but is on medication for it.  Skin lesion: face   &quot;pre-cancerous&quot;  Cyst: scalp excised 20 yrs ago        Medication list         MEDICATIONS  (STANDING):  atorvastatin 20 milliGRAM(s) Oral at bedtime  azithromycin  IVPB 500 milliGRAM(s) IV Intermittent every 24 hours  cefTRIAXone   IVPB 1000 milliGRAM(s) IV Intermittent every 24 hours  clonazePAM  Tablet 2 milliGRAM(s) Oral at bedtime  cloZAPine 200 milliGRAM(s) Oral at bedtime  fluticasone propionate 50 MICROgram(s)/spray Nasal Spray 2 Spray(s) Both Nostrils daily  melatonin 3 milliGRAM(s) Oral at bedtime  pantoprazole    Tablet 40 milliGRAM(s) Oral before breakfast  PARoxetine 30 milliGRAM(s) Oral two times a day  sodium chloride 0.9%. 1000 milliLiter(s) (100 mL/Hr) IV Continuous <Continuous>  verapamil  milliGRAM(s) Oral daily    MEDICATIONS  (PRN):  acetaminophen   Tablet .. 650 milliGRAM(s) Oral every 6 hours PRN Temp greater or equal to 38C (100.4F), Moderate Pain (4 - 6)         Vitals log        ICU Vital Signs Last 24 Hrs  T(C): 36.9 (01 Nov 2019 05:10), Max: 37.7 (31 Oct 2019 11:11)  T(F): 98.5 (01 Nov 2019 05:10), Max: 99.8 (31 Oct 2019 11:11)  HR: 78 (01 Nov 2019 05:10) (68 - 92)  BP: 105/72 (01 Nov 2019 05:10) (104/59 - 112/66)  BP(mean): --  ABP: --  ABP(mean): --  RR: 17 (01 Nov 2019 05:10) (16 - 18)  SpO2: 94% (01 Nov 2019 05:10) (94% - 98%)           Input and Output:  I&O's Detail    31 Oct 2019 07:01  -  01 Nov 2019 06:16  --------------------------------------------------------  IN:    sodium chloride 0.9%.: 1100 mL  Total IN: 1100 mL    OUT:  Total OUT: 0 mL    Total NET: 1100 mL          Lab Data                        13.0   12.30 )-----------( 215      ( 31 Oct 2019 10:01 )             38.4     10-31    138  |  104  |  22  ----------------------------<  100<H>  3.8   |  22  |  1.80<H>    Ca    8.9      31 Oct 2019 10:01    TPro  8.2  /  Alb  3.7  /  TBili  1.6<H>  /  DBili  x   /  AST  19  /  ALT  18  /  AlkPhos  105  10-31            Review of Systems	      Objective     Physical Examination    heart s1s2  lung dc BS  abd soft  poor dentition      Pertinent Lab findings & Imaging      Landon:  NO   Adequate UO     I&O's Detail    31 Oct 2019 07:01  -  01 Nov 2019 06:16  --------------------------------------------------------  IN:    sodium chloride 0.9%.: 1100 mL  Total IN: 1100 mL    OUT:  Total OUT: 0 mL    Total NET: 1100 mL               Discussed with:     Cultures:	        Radiology

## 2019-11-01 NOTE — PROGRESS NOTE ADULT - PROBLEM SELECTOR PROBLEM 4
Hyperlipidemia Detail Level: Zone Quality 130: Documentation Of Current Medications In The Medical Record: Current Medications Documented Quality 128: Preventive Care And Screening: Body Mass Index (Bmi) Screening And Follow-Up Plan: BMI is documented within normal parameters and no follow-up plan is required.

## 2019-11-01 NOTE — CHART NOTE - NSCHARTNOTEFT_GEN_A_CORE
no availability for Bronchoscopy add on today  pt will have Bronchoscopy on Monday at noon - scheduled -   will follow  discussed with patient

## 2019-11-01 NOTE — PROGRESS NOTE ADULT - PROBLEM SELECTOR PLAN 1
recurrent pna - poor dentition - psych meds - may be due to recurrent aspiration and poor oral hygiene -   ct chest reviewed - may need Bronchoscopy - discussed with outpatient Pulm MD -   poss Bronchoscopy this am - will be an add on - will find out later this am - NPO for now -   on IVF for JOCELYN  on dual ABX for CAP - asp pna -   cx pending  labs and imaging reviewed

## 2019-11-01 NOTE — PROGRESS NOTE ADULT - SUBJECTIVE AND OBJECTIVE BOX
Patient is a 59y old  Male who presents with a chief complaint of fever and not feeling well with cough (2019 06:16)      INTERVAL HPI/OVERNIGHT EVENTS: pt still with cough and not feeling well, pulm fu noted    MEDICATIONS  (STANDING):  atorvastatin 20 milliGRAM(s) Oral at bedtime  azithromycin  IVPB 500 milliGRAM(s) IV Intermittent every 24 hours  cefTRIAXone   IVPB 1000 milliGRAM(s) IV Intermittent every 24 hours  clonazePAM  Tablet 2 milliGRAM(s) Oral at bedtime  cloZAPine 200 milliGRAM(s) Oral at bedtime  enoxaparin Injectable 40 milliGRAM(s) SubCutaneous daily  fluticasone propionate 50 MICROgram(s)/spray Nasal Spray 2 Spray(s) Both Nostrils daily  melatonin 3 milliGRAM(s) Oral at bedtime  pantoprazole    Tablet 40 milliGRAM(s) Oral before breakfast  PARoxetine 30 milliGRAM(s) Oral two times a day  verapamil  milliGRAM(s) Oral daily    MEDICATIONS  (PRN):  acetaminophen   Tablet .. 650 milliGRAM(s) Oral every 6 hours PRN Temp greater or equal to 38C (100.4F), Moderate Pain (4 - 6)  guaiFENesin   Syrup  (Sugar-Free) 200 milliGRAM(s) Oral every 6 hours PRN Cough      Allergies    No Known Allergies    Intolerances        REVIEW OF SYSTEMS:  CONSTITUTIONAL: No fever, weight loss, or fatigue  EYES: No eye pain, visual disturbances  ENMT:  No difficulty hearing, tinnitus, vertigo; No sinus or throat pain  NECK: No pain or stiffness  RESPIRATORY: cough with sob  CARDIOVASCULAR: No chest pain, palpitations, dizziness  GASTROINTESTINAL: No abdominal or epigastric pain. No nausea, vomiting, or hematemesis; No diarrhea or constipation. No melena or hematochezia.  GENITOURINARY: No dysuria, frequency, hematuria, or incontinence  NEUROLOGICAL: No headaches, memory loss, loss of strength, numbness, or tremors  SKIN: No itching, burning  LYMPH NODES: No enlarged glands  MUSCULOSKELETAL: No joint pain or swelling; No muscle, back, or extremity pain  PSYCHIATRIC: No depression, mood swings  HEME/LYMPH: No easy bruising, or bleeding gums  ALLERGY AND IMMUNOLOGIC: No hives    Vital Signs Last 24 Hrs  T(C): 36.9 (2019 05:10), Max: 36.9 (2019 05:10)  T(F): 98.5 (2019 05:10), Max: 98.5 (2019 05:10)  HR: 78 (2019 05:10) (68 - 78)  BP: 105/72 (2019 05:10) (104/59 - 105/73)  BP(mean): --  RR: 17 (2019 05:10) (16 - 17)  SpO2: 94% (2019 05:10) (94% - 97%)    PHYSICAL EXAM:  GENERAL: NAD, well-groomed, well-developed  HEAD:  Atraumatic, Normocephalic  EYES: EOMI, PERRLA, conjunctiva and sclera clear  ENMT: No tonsillar erythema, exudates, or enlargement   NECK: Supple, No JVD  NERVOUS SYSTEM:  Alert & Oriented X3, Good concentration  CHEST/LUNG: Clear to auscultation bilaterally; No rales, rhonchi, wheezing  HEART: Regular rate and rhythm  ABDOMEN: Soft, Nontender, Nondistended; Bowel sounds present  EXTREMITIES:  2+ Peripheral Pulses   LYMPH: No lymphadenopathy noted  SKIN: No rashes     LABS:                        10.7   6.43  )-----------( 179      ( 2019 08:37 )             31.5     2019 08:37    143    |  113    |  16     ----------------------------<  84     3.7     |  21     |  1.10     Ca    8.0        2019 08:37        Urinalysis Basic - ( 31 Oct 2019 13:46 )    Color: Yellow / Appearance: Clear / S.015 / pH: x  Gluc: x / Ketone: Large  / Bili: Negative / Urobili: Negative   Blood: x / Protein: 25 mg/dL / Nitrite: Negative   Leuk Esterase: Negative / RBC: 0-2 /HPF / WBC 0-2   Sq Epi: x / Non Sq Epi: Occasional / Bacteria: Occasional      CAPILLARY BLOOD GLUCOSE                RADIOLOGY & ADDITIONAL TESTS:  no new      Consultant(s) Notes Reviewed:  [ x] YES  [ ] NO    Care Discussed with Consultants/Other Providers [ x] YES  [ ] NO    Advanced care planning discussed with patient and family, advanced care planning forms reviewed, discussed, and completed.  20 minutes spent.

## 2019-11-01 NOTE — PROGRESS NOTE ADULT - PROBLEM SELECTOR PLAN 1
pt with recurrent pna  pulm eval called and noted  for bronch on monday  speech and swallow eval noted  mbs ordered and diet adjusted  iv rocephin and zithromax  trend labs  fu cultures sputum and blood

## 2019-11-02 LAB
ANION GAP SERPL CALC-SCNC: 7 MMOL/L — SIGNIFICANT CHANGE UP (ref 5–17)
BUN SERPL-MCNC: 13 MG/DL — SIGNIFICANT CHANGE UP (ref 7–23)
CALCIUM SERPL-MCNC: 7.8 MG/DL — LOW (ref 8.5–10.1)
CHLORIDE SERPL-SCNC: 110 MMOL/L — HIGH (ref 96–108)
CO2 SERPL-SCNC: 25 MMOL/L — SIGNIFICANT CHANGE UP (ref 22–31)
CREAT SERPL-MCNC: 1.3 MG/DL — SIGNIFICANT CHANGE UP (ref 0.5–1.3)
GLUCOSE SERPL-MCNC: 99 MG/DL — SIGNIFICANT CHANGE UP (ref 70–99)
HCT VFR BLD CALC: 30 % — LOW (ref 39–50)
HGB BLD-MCNC: 10.2 G/DL — LOW (ref 13–17)
MCHC RBC-ENTMCNC: 29.5 PG — SIGNIFICANT CHANGE UP (ref 27–34)
MCHC RBC-ENTMCNC: 34 GM/DL — SIGNIFICANT CHANGE UP (ref 32–36)
MCV RBC AUTO: 86.7 FL — SIGNIFICANT CHANGE UP (ref 80–100)
NRBC # BLD: 0 /100 WBCS — SIGNIFICANT CHANGE UP (ref 0–0)
PLATELET # BLD AUTO: 206 K/UL — SIGNIFICANT CHANGE UP (ref 150–400)
POTASSIUM SERPL-MCNC: 3.3 MMOL/L — LOW (ref 3.5–5.3)
POTASSIUM SERPL-SCNC: 3.3 MMOL/L — LOW (ref 3.5–5.3)
RBC # BLD: 3.46 M/UL — LOW (ref 4.2–5.8)
RBC # FLD: 13.1 % — SIGNIFICANT CHANGE UP (ref 10.3–14.5)
SODIUM SERPL-SCNC: 142 MMOL/L — SIGNIFICANT CHANGE UP (ref 135–145)
WBC # BLD: 6.51 K/UL — SIGNIFICANT CHANGE UP (ref 3.8–10.5)
WBC # FLD AUTO: 6.51 K/UL — SIGNIFICANT CHANGE UP (ref 3.8–10.5)

## 2019-11-02 RX ORDER — POTASSIUM CHLORIDE 20 MEQ
40 PACKET (EA) ORAL ONCE
Refills: 0 | Status: COMPLETED | OUTPATIENT
Start: 2019-11-02 | End: 2019-11-02

## 2019-11-02 RX ADMIN — CEFTRIAXONE 100 MILLIGRAM(S): 500 INJECTION, POWDER, FOR SOLUTION INTRAMUSCULAR; INTRAVENOUS at 12:55

## 2019-11-02 RX ADMIN — Medication 100 MILLIGRAM(S): at 05:32

## 2019-11-02 RX ADMIN — Medication 100 MILLIGRAM(S): at 21:48

## 2019-11-02 RX ADMIN — CLOZAPINE 200 MILLIGRAM(S): 150 TABLET, ORALLY DISINTEGRATING ORAL at 21:48

## 2019-11-02 RX ADMIN — Medication 2 SPRAY(S): at 11:28

## 2019-11-02 RX ADMIN — AZITHROMYCIN 255 MILLIGRAM(S): 500 TABLET, FILM COATED ORAL at 13:56

## 2019-11-02 RX ADMIN — ENOXAPARIN SODIUM 40 MILLIGRAM(S): 100 INJECTION SUBCUTANEOUS at 11:28

## 2019-11-02 RX ADMIN — Medication 100 MILLIGRAM(S): at 13:29

## 2019-11-02 RX ADMIN — Medication 30 MILLIGRAM(S): at 17:49

## 2019-11-02 RX ADMIN — Medication 30 MILLIGRAM(S): at 05:32

## 2019-11-02 RX ADMIN — PANTOPRAZOLE SODIUM 40 MILLIGRAM(S): 20 TABLET, DELAYED RELEASE ORAL at 05:32

## 2019-11-02 RX ADMIN — Medication 2 MILLIGRAM(S): at 21:48

## 2019-11-02 RX ADMIN — ATORVASTATIN CALCIUM 20 MILLIGRAM(S): 80 TABLET, FILM COATED ORAL at 21:48

## 2019-11-02 RX ADMIN — Medication 3 MILLIGRAM(S): at 21:48

## 2019-11-02 RX ADMIN — Medication 40 MILLIEQUIVALENT(S): at 13:29

## 2019-11-02 NOTE — PROGRESS NOTE ADULT - SUBJECTIVE AND OBJECTIVE BOX
Patient is a 59y old  Male who presents with a chief complaint of fever and not feeling well with cough (2019 06:03)      INTERVAL HPI/OVERNIGHT EVENTS: with low grade fever, but feels better    MEDICATIONS  (STANDING):  atorvastatin 20 milliGRAM(s) Oral at bedtime  azithromycin  IVPB 500 milliGRAM(s) IV Intermittent every 24 hours  benzonatate 100 milliGRAM(s) Oral three times a day  cefTRIAXone   IVPB 1000 milliGRAM(s) IV Intermittent every 24 hours  clonazePAM  Tablet 2 milliGRAM(s) Oral at bedtime  cloZAPine 200 milliGRAM(s) Oral at bedtime  enoxaparin Injectable 40 milliGRAM(s) SubCutaneous daily  fluticasone propionate 50 MICROgram(s)/spray Nasal Spray 2 Spray(s) Both Nostrils daily  melatonin 3 milliGRAM(s) Oral at bedtime  pantoprazole    Tablet 40 milliGRAM(s) Oral before breakfast  PARoxetine 30 milliGRAM(s) Oral two times a day  potassium chloride    Tablet ER 40 milliEquivalent(s) Oral once  verapamil  milliGRAM(s) Oral daily    MEDICATIONS  (PRN):  acetaminophen   Tablet .. 650 milliGRAM(s) Oral every 6 hours PRN Temp greater or equal to 38C (100.4F), Moderate Pain (4 - 6)  guaiFENesin   Syrup  (Sugar-Free) 200 milliGRAM(s) Oral every 6 hours PRN Cough      Allergies    No Known Allergies    Intolerances        REVIEW OF SYSTEMS:  CONSTITUTIONAL: low grade fever  EYES: No eye pain, visual disturbances  ENMT:  No difficulty hearing, tinnitus, vertigo; No sinus or throat pain  NECK: No pain or stiffness  RESPIRATORY: No cough, wheezing, chills or hemoptysis; No shortness of breath  CARDIOVASCULAR: No chest pain, palpitations, dizziness  GASTROINTESTINAL: No abdominal or epigastric pain. No nausea, vomiting, or hematemesis; No diarrhea or constipation. No melena or hematochezia.  GENITOURINARY: No dysuria, frequency, hematuria, or incontinence  NEUROLOGICAL: No headaches, memory loss, loss of strength, numbness, or tremors  SKIN: No itching, burning  LYMPH NODES: No enlarged glands  MUSCULOSKELETAL: No joint pain or swelling; No muscle, back, or extremity pain  PSYCHIATRIC: No depression, mood swings  HEME/LYMPH: No easy bruising, or bleeding gums  ALLERGY AND IMMUNOLOGIC: No hives    Vital Signs Last 24 Hrs  T(C): 37.3 (2019 04:45), Max: 37.3 (2019 12:26)  T(F): 99.1 (2019 04:45), Max: 99.2 (2019 12:26)  HR: 69 (2019 04:45) (69 - 77)  BP: 101/66 (2019 04:45) (101/66 - 103/62)  BP(mean): --  RR: 18 (2019 04:45) (18 - 18)  SpO2: 94% (2019 04:45) (94% - 96%)    PHYSICAL EXAM:  GENERAL: NAD, well-groomed, well-developed  HEAD:  Atraumatic, Normocephalic  EYES: EOMI, PERRLA, conjunctiva and sclera clear  ENMT: No tonsillar erythema, exudates, or enlargement   NECK: Supple, No JVD  NERVOUS SYSTEM:  Alert & Oriented X3, Good concentration  CHEST/LUNG: Clear to auscultation bilaterally; No rales, rhonchi, wheezing  HEART: Regular rate and rhythm  ABDOMEN: Soft, Nontender, Nondistended; Bowel sounds present  EXTREMITIES:  2+ Peripheral Pulses   LYMPH: No lymphadenopathy noted  SKIN: No rashes     LABS:                        10.2   6.51  )-----------( 206      ( 2019 05:37 )             30.0     2019 05:37    142    |  110    |  13     ----------------------------<  99     3.3     |  25     |  1.30     Ca    7.8        2019 05:37        Urinalysis Basic - ( 31 Oct 2019 13:46 )    Color: Yellow / Appearance: Clear / S.015 / pH: x  Gluc: x / Ketone: Large  / Bili: Negative / Urobili: Negative   Blood: x / Protein: 25 mg/dL / Nitrite: Negative   Leuk Esterase: Negative / RBC: 0-2 /HPF / WBC 0-2   Sq Epi: x / Non Sq Epi: Occasional / Bacteria: Occasional      CAPILLARY BLOOD GLUCOSE        blood culture --   No growth   10-31 @ 18:32    blood culture --   No growth to date.   10-31 @ 17:01      urine culture --  10-31 @ 18:32  results   No growth 10-31 @ 18:32  urine culture --  10-31 @ 17:01  results   No growth to date. 10-31 @ :01    wound with gram statin --    10-31 @ :32  organism  --   10-31 @ 18:32  specimen source .Urine Clean Catch (Midstream)  10-31 @ 18:32  wound with gram statin --    10-31 @ 17:01  organism  --   10-31 @ :01  specimen source .Blood Blood-Peripheral  10-31 @ 17:01      RADIOLOGY & ADDITIONAL TESTS:no new      Consultant(s) Notes Reviewed:  [x ] YES  [ ] NO    Care Discussed with Consultants/Other Providers [x ] YES  [ ] NO    Advanced care planning discussed with patient and family, advanced care planning forms reviewed, discussed, and completed.  20 minutes spent.

## 2019-11-02 NOTE — PROGRESS NOTE ADULT - SUBJECTIVE AND OBJECTIVE BOX
Date/Time Patient Seen:  		  Referring MD:   Data Reviewed	       Patient is a 59y old  Male who presents with a chief complaint of fever and not feeling well with cough (01 Nov 2019 11:36)      Subjective/HPI     PAST MEDICAL & SURGICAL HISTORY:  H/O pleural effusion: DX IN 2011 when pt had pneumonia  chronic condition now  CIRILO (obstructive sleep apnea)  Obesity  Migraines  Pneumonia: 2011  Smoking hx  H/O ETOH abuse: sober x 20 yrs  Drug abuse, in remission: sober x 20 yrs  HLD (hyperlipidemia)  Schizophrenia: pt never stated he had schizophrenia during interview but is on medication for it.  Skin lesion: face   &quot;pre-cancerous&quot;  Cyst: scalp excised 20 yrs ago        Medication list         MEDICATIONS  (STANDING):  atorvastatin 20 milliGRAM(s) Oral at bedtime  azithromycin  IVPB 500 milliGRAM(s) IV Intermittent every 24 hours  benzonatate 100 milliGRAM(s) Oral three times a day  cefTRIAXone   IVPB 1000 milliGRAM(s) IV Intermittent every 24 hours  clonazePAM  Tablet 2 milliGRAM(s) Oral at bedtime  cloZAPine 200 milliGRAM(s) Oral at bedtime  enoxaparin Injectable 40 milliGRAM(s) SubCutaneous daily  fluticasone propionate 50 MICROgram(s)/spray Nasal Spray 2 Spray(s) Both Nostrils daily  melatonin 3 milliGRAM(s) Oral at bedtime  pantoprazole    Tablet 40 milliGRAM(s) Oral before breakfast  PARoxetine 30 milliGRAM(s) Oral two times a day  verapamil  milliGRAM(s) Oral daily    MEDICATIONS  (PRN):  acetaminophen   Tablet .. 650 milliGRAM(s) Oral every 6 hours PRN Temp greater or equal to 38C (100.4F), Moderate Pain (4 - 6)  guaiFENesin   Syrup  (Sugar-Free) 200 milliGRAM(s) Oral every 6 hours PRN Cough         Vitals log        ICU Vital Signs Last 24 Hrs  T(C): 37.3 (02 Nov 2019 04:45), Max: 37.3 (01 Nov 2019 12:26)  T(F): 99.1 (02 Nov 2019 04:45), Max: 99.2 (01 Nov 2019 12:26)  HR: 69 (02 Nov 2019 04:45) (69 - 77)  BP: 101/66 (02 Nov 2019 04:45) (101/66 - 103/62)  BP(mean): --  ABP: --  ABP(mean): --  RR: 18 (02 Nov 2019 04:45) (18 - 18)  SpO2: 94% (02 Nov 2019 04:45) (94% - 96%)           Input and Output:  I&O's Detail    31 Oct 2019 07:01  -  01 Nov 2019 07:00  --------------------------------------------------------  IN:    sodium chloride 0.9%: 1100 mL  Total IN: 1100 mL    OUT:  Total OUT: 0 mL    Total NET: 1100 mL      01 Nov 2019 07:01  -  02 Nov 2019 06:03  --------------------------------------------------------  IN:    Oral Fluid: 600 mL    sodium chloride 0.9%: 400 mL    Solution: 50 mL    Solution: 250 mL  Total IN: 1300 mL    OUT:    Voided: 1100 mL  Total OUT: 1100 mL    Total NET: 200 mL          Lab Data                        10.2   6.51  )-----------( 206      ( 02 Nov 2019 05:37 )             30.0     11-02    142  |  110<H>  |  13  ----------------------------<  99  3.3<L>   |  25  |  1.30    Ca    7.8<L>      02 Nov 2019 05:37    TPro  8.2  /  Alb  3.7  /  TBili  1.6<H>  /  DBili  x   /  AST  19  /  ALT  18  /  AlkPhos  105  10-31            Review of Systems	      Objective     Physical Examination    heart s1s2  lung dec BS      Pertinent Lab findings & Imaging      Landon:  NO   Adequate UO     I&O's Detail    31 Oct 2019 07:01  -  01 Nov 2019 07:00  --------------------------------------------------------  IN:    sodium chloride 0.9%: 1100 mL  Total IN: 1100 mL    OUT:  Total OUT: 0 mL    Total NET: 1100 mL      01 Nov 2019 07:01  -  02 Nov 2019 06:03  --------------------------------------------------------  IN:    Oral Fluid: 600 mL    sodium chloride 0.9%: 400 mL    Solution: 50 mL    Solution: 250 mL  Total IN: 1300 mL    OUT:    Voided: 1100 mL  Total OUT: 1100 mL    Total NET: 200 mL               Discussed with:     Cultures:	        Radiology

## 2019-11-03 DIAGNOSIS — E86.0 DEHYDRATION: ICD-10-CM

## 2019-11-03 LAB
ANION GAP SERPL CALC-SCNC: 7 MMOL/L — SIGNIFICANT CHANGE UP (ref 5–17)
BUN SERPL-MCNC: 10 MG/DL — SIGNIFICANT CHANGE UP (ref 7–23)
CALCIUM SERPL-MCNC: 8.4 MG/DL — LOW (ref 8.5–10.1)
CHLORIDE SERPL-SCNC: 110 MMOL/L — HIGH (ref 96–108)
CO2 SERPL-SCNC: 25 MMOL/L — SIGNIFICANT CHANGE UP (ref 22–31)
CREAT SERPL-MCNC: 1.1 MG/DL — SIGNIFICANT CHANGE UP (ref 0.5–1.3)
GLUCOSE SERPL-MCNC: 88 MG/DL — SIGNIFICANT CHANGE UP (ref 70–99)
HCT VFR BLD CALC: 30.8 % — LOW (ref 39–50)
HGB BLD-MCNC: 10.5 G/DL — LOW (ref 13–17)
MCHC RBC-ENTMCNC: 29.6 PG — SIGNIFICANT CHANGE UP (ref 27–34)
MCHC RBC-ENTMCNC: 34.1 GM/DL — SIGNIFICANT CHANGE UP (ref 32–36)
MCV RBC AUTO: 86.8 FL — SIGNIFICANT CHANGE UP (ref 80–100)
NRBC # BLD: 0 /100 WBCS — SIGNIFICANT CHANGE UP (ref 0–0)
PLATELET # BLD AUTO: 241 K/UL — SIGNIFICANT CHANGE UP (ref 150–400)
POTASSIUM SERPL-MCNC: 3.5 MMOL/L — SIGNIFICANT CHANGE UP (ref 3.5–5.3)
POTASSIUM SERPL-SCNC: 3.5 MMOL/L — SIGNIFICANT CHANGE UP (ref 3.5–5.3)
RBC # BLD: 3.55 M/UL — LOW (ref 4.2–5.8)
RBC # FLD: 12.9 % — SIGNIFICANT CHANGE UP (ref 10.3–14.5)
SODIUM SERPL-SCNC: 142 MMOL/L — SIGNIFICANT CHANGE UP (ref 135–145)
WBC # BLD: 4.91 K/UL — SIGNIFICANT CHANGE UP (ref 3.8–10.5)
WBC # FLD AUTO: 4.91 K/UL — SIGNIFICANT CHANGE UP (ref 3.8–10.5)

## 2019-11-03 RX ORDER — VERAPAMIL HCL 240 MG
120 CAPSULE, EXTENDED RELEASE PELLETS 24 HR ORAL DAILY
Refills: 0 | Status: DISCONTINUED | OUTPATIENT
Start: 2019-11-03 | End: 2019-11-06

## 2019-11-03 RX ORDER — SODIUM CHLORIDE 9 MG/ML
1000 INJECTION INTRAMUSCULAR; INTRAVENOUS; SUBCUTANEOUS
Refills: 0 | Status: DISCONTINUED | OUTPATIENT
Start: 2019-11-03 | End: 2019-11-04

## 2019-11-03 RX ORDER — POTASSIUM CHLORIDE 20 MEQ
20 PACKET (EA) ORAL ONCE
Refills: 0 | Status: COMPLETED | OUTPATIENT
Start: 2019-11-03 | End: 2019-11-03

## 2019-11-03 RX ADMIN — Medication 20 MILLIEQUIVALENT(S): at 10:51

## 2019-11-03 RX ADMIN — SODIUM CHLORIDE 100 MILLILITER(S): 9 INJECTION INTRAMUSCULAR; INTRAVENOUS; SUBCUTANEOUS at 10:51

## 2019-11-03 RX ADMIN — Medication 30 MILLIGRAM(S): at 05:08

## 2019-11-03 RX ADMIN — Medication 2 MILLIGRAM(S): at 21:15

## 2019-11-03 RX ADMIN — Medication 2 SPRAY(S): at 11:05

## 2019-11-03 RX ADMIN — ATORVASTATIN CALCIUM 20 MILLIGRAM(S): 80 TABLET, FILM COATED ORAL at 21:16

## 2019-11-03 RX ADMIN — CEFTRIAXONE 100 MILLIGRAM(S): 500 INJECTION, POWDER, FOR SOLUTION INTRAMUSCULAR; INTRAVENOUS at 11:34

## 2019-11-03 RX ADMIN — Medication 100 MILLIGRAM(S): at 05:08

## 2019-11-03 RX ADMIN — Medication 30 MILLIGRAM(S): at 17:28

## 2019-11-03 RX ADMIN — AZITHROMYCIN 255 MILLIGRAM(S): 500 TABLET, FILM COATED ORAL at 12:37

## 2019-11-03 RX ADMIN — PANTOPRAZOLE SODIUM 40 MILLIGRAM(S): 20 TABLET, DELAYED RELEASE ORAL at 05:08

## 2019-11-03 RX ADMIN — SODIUM CHLORIDE 100 MILLILITER(S): 9 INJECTION INTRAMUSCULAR; INTRAVENOUS; SUBCUTANEOUS at 22:27

## 2019-11-03 RX ADMIN — CLOZAPINE 200 MILLIGRAM(S): 150 TABLET, ORALLY DISINTEGRATING ORAL at 21:16

## 2019-11-03 RX ADMIN — Medication 3 MILLIGRAM(S): at 21:16

## 2019-11-03 NOTE — PROGRESS NOTE ADULT - PROBLEM/PLAN-4
DISPLAY PLAN FREE TEXT Oculoplastic Surgeon Procedure Text (C): After obtaining clear surgical margins the patient was sent to oculoplastics for surgical repair.  The patient understands they will receive post-surgical care and follow-up from the referring physician's office.

## 2019-11-03 NOTE — PROGRESS NOTE ADULT - PROBLEM SELECTOR PLAN 1
recurrent PNA  planned for Bronchoscopy tomorrow at [facility]  biomarkers pending or never sent out  on room air  cough rx regimen  out of bed  poor dentition - education and counseling - oral hygiene  psych hx -   on DUAL ABX regimen - cx reviewed - CT chest reviewed -

## 2019-11-03 NOTE — PROGRESS NOTE ADULT - SUBJECTIVE AND OBJECTIVE BOX
Date/Time Patient Seen:  		  Referring MD:   Data Reviewed	       Patient is a 59y old  Male who presents with a chief complaint of fever and not feeling well with cough (02 Nov 2019 10:33)      Subjective/HPI     PAST MEDICAL & SURGICAL HISTORY:  H/O pleural effusion: DX IN 2011 when pt had pneumonia  chronic condition now  CIRILO (obstructive sleep apnea)  Obesity  Migraines  Pneumonia: 2011  Smoking hx  H/O ETOH abuse: sober x 20 yrs  Drug abuse, in remission: sober x 20 yrs  HLD (hyperlipidemia)  Schizophrenia: pt never stated he had schizophrenia during interview but is on medication for it.  Skin lesion: face   &quot;pre-cancerous&quot;  Cyst: scalp excised 20 yrs ago        Medication list         MEDICATIONS  (STANDING):  atorvastatin 20 milliGRAM(s) Oral at bedtime  azithromycin  IVPB 500 milliGRAM(s) IV Intermittent every 24 hours  cefTRIAXone   IVPB 1000 milliGRAM(s) IV Intermittent every 24 hours  clonazePAM  Tablet 2 milliGRAM(s) Oral at bedtime  cloZAPine 200 milliGRAM(s) Oral at bedtime  enoxaparin Injectable 40 milliGRAM(s) SubCutaneous daily  fluticasone propionate 50 MICROgram(s)/spray Nasal Spray 2 Spray(s) Both Nostrils daily  melatonin 3 milliGRAM(s) Oral at bedtime  pantoprazole    Tablet 40 milliGRAM(s) Oral before breakfast  PARoxetine 30 milliGRAM(s) Oral two times a day  verapamil  milliGRAM(s) Oral daily    MEDICATIONS  (PRN):  acetaminophen   Tablet .. 650 milliGRAM(s) Oral every 6 hours PRN Temp greater or equal to 38C (100.4F), Moderate Pain (4 - 6)  guaiFENesin   Syrup  (Sugar-Free) 200 milliGRAM(s) Oral every 6 hours PRN Cough         Vitals log        ICU Vital Signs Last 24 Hrs  T(C): 36.9 (03 Nov 2019 04:48), Max: 37.6 (02 Nov 2019 20:24)  T(F): 98.4 (03 Nov 2019 04:48), Max: 99.6 (02 Nov 2019 20:24)  HR: 77 (03 Nov 2019 04:48) (71 - 85)  BP: 100/62 (03 Nov 2019 04:48) (100/62 - 128/79)  BP(mean): --  ABP: --  ABP(mean): --  RR: 18 (03 Nov 2019 04:48) (14 - 18)  SpO2: 94% (03 Nov 2019 04:48) (93% - 95%)           Input and Output:  I&O's Detail    01 Nov 2019 07:01  -  02 Nov 2019 07:00  --------------------------------------------------------  IN:    Oral Fluid: 600 mL    sodium chloride 0.9%: 400 mL    Solution: 50 mL    Solution: 250 mL  Total IN: 1300 mL    OUT:    Voided: 1100 mL  Total OUT: 1100 mL    Total NET: 200 mL      02 Nov 2019 08:01  -  03 Nov 2019 06:03  --------------------------------------------------------  IN:    Solution: 250 mL    Solution: 50 mL  Total IN: 300 mL    OUT:    Voided: 1050 mL  Total OUT: 1050 mL    Total NET: -750 mL          Lab Data                        10.2   6.51  )-----------( 206      ( 02 Nov 2019 05:37 )             30.0     11-02    142  |  110<H>  |  13  ----------------------------<  99  3.3<L>   |  25  |  1.30    Ca    7.8<L>      02 Nov 2019 05:37              Review of Systems	      Objective     Physical Examination  heart s1s2  lung dec BS        Pertinent Lab findings & Imaging      Landon:  NO   Adequate UO     I&O's Detail    01 Nov 2019 07:01  -  02 Nov 2019 07:00  --------------------------------------------------------  IN:    Oral Fluid: 600 mL    sodium chloride 0.9%: 400 mL    Solution: 50 mL    Solution: 250 mL  Total IN: 1300 mL    OUT:    Voided: 1100 mL  Total OUT: 1100 mL    Total NET: 200 mL      02 Nov 2019 08:01  -  03 Nov 2019 06:03  --------------------------------------------------------  IN:    Solution: 250 mL    Solution: 50 mL  Total IN: 300 mL    OUT:    Voided: 1050 mL  Total OUT: 1050 mL    Total NET: -750 mL               Discussed with:     Cultures:	        Radiology

## 2019-11-03 NOTE — PROGRESS NOTE ADULT - SUBJECTIVE AND OBJECTIVE BOX
Patient is a 59y old  Male who presents with a chief complaint of fever and not feeling well with cough (03 Nov 2019 06:03)      INTERVAL HPI/OVERNIGHT EVENTS: stable, tired today, poor po intake    MEDICATIONS  (STANDING):  atorvastatin 20 milliGRAM(s) Oral at bedtime  azithromycin  IVPB 500 milliGRAM(s) IV Intermittent every 24 hours  cefTRIAXone   IVPB 1000 milliGRAM(s) IV Intermittent every 24 hours  clonazePAM  Tablet 2 milliGRAM(s) Oral at bedtime  cloZAPine 200 milliGRAM(s) Oral at bedtime  enoxaparin Injectable 40 milliGRAM(s) SubCutaneous daily  fluticasone propionate 50 MICROgram(s)/spray Nasal Spray 2 Spray(s) Both Nostrils daily  melatonin 3 milliGRAM(s) Oral at bedtime  pantoprazole    Tablet 40 milliGRAM(s) Oral before breakfast  PARoxetine 30 milliGRAM(s) Oral two times a day  potassium chloride    Tablet ER 20 milliEquivalent(s) Oral once  sodium chloride 0.9%. 1000 milliLiter(s) (100 mL/Hr) IV Continuous <Continuous>  verapamil  milliGRAM(s) Oral daily    MEDICATIONS  (PRN):  acetaminophen   Tablet .. 650 milliGRAM(s) Oral every 6 hours PRN Temp greater or equal to 38C (100.4F), Moderate Pain (4 - 6)  guaiFENesin   Syrup  (Sugar-Free) 200 milliGRAM(s) Oral every 6 hours PRN Cough      Allergies    No Known Allergies    Intolerances        REVIEW OF SYSTEMS:  CONSTITUTIONAL: No fever, weight loss, or fatigue  EYES: No eye pain, visual disturbances  ENMT:  No difficulty hearing, tinnitus, vertigo; No sinus or throat pain  NECK: No pain or stiffness  RESPIRATORY: No cough, wheezing, chills or hemoptysis; No shortness of breath  CARDIOVASCULAR: No chest pain, palpitations, dizziness  GASTROINTESTINAL: No abdominal or epigastric pain. No nausea, vomiting, or hematemesis; No diarrhea or constipation. No melena or hematochezia.  GENITOURINARY: No dysuria, frequency, hematuria, or incontinence  NEUROLOGICAL: No headaches, memory loss, loss of strength, numbness, or tremors  SKIN: No itching, burning  LYMPH NODES: No enlarged glands  MUSCULOSKELETAL: No joint pain or swelling; No muscle, back, or extremity pain  PSYCHIATRIC: No depression, mood swings  HEME/LYMPH: No easy bruising, or bleeding gums  ALLERGY AND IMMUNOLOGIC: No hives    Vital Signs Last 24 Hrs  T(C): 36.9 (03 Nov 2019 04:48), Max: 37.6 (02 Nov 2019 20:24)  T(F): 98.4 (03 Nov 2019 04:48), Max: 99.6 (02 Nov 2019 20:24)  HR: 77 (03 Nov 2019 04:48) (71 - 85)  BP: 100/62 (03 Nov 2019 04:48) (100/62 - 128/79)  BP(mean): --  RR: 18 (03 Nov 2019 04:48) (14 - 18)  SpO2: 94% (03 Nov 2019 04:48) (93% - 95%)    PHYSICAL EXAM:  GENERAL: NAD, well-groomed, well-developed  HEAD:  Atraumatic, Normocephalic  EYES: EOMI, PERRLA, conjunctiva and sclera clear  ENMT: No tonsillar erythema, exudates, or enlargement   NECK: Supple, No JVD  NERVOUS SYSTEM:  Alert & Oriented X3, Good concentration  CHEST/LUNG: Clear to auscultation bilaterally; No rales, rhonchi, wheezing  HEART: Regular rate and rhythm  ABDOMEN: Soft, Nontender, Nondistended; Bowel sounds present  EXTREMITIES:  2+ Peripheral Pulses   LYMPH: No lymphadenopathy noted  SKIN: No rashes     LABS:                        10.5   4.91  )-----------( 241      ( 03 Nov 2019 07:08 )             30.8     03 Nov 2019 07:08    142    |  110    |  10     ----------------------------<  88     3.5     |  25     |  1.10     Ca    8.4        03 Nov 2019 07:08          CAPILLARY BLOOD GLUCOSE        blood culture --   No growth   10-31 @ 18:32    blood culture --   No growth to date.   10-31 @ 17:01      urine culture --  10-31 @ 18:32  results   No growth 10-31 @ 18:32  urine culture --  10-31 @ 17:01  results   No growth to date. 10-31 @ 17:01    wound with gram statin --    10-31 @ 18:32  organism  --   10-31 @ 18:32  specimen source .Urine Clean Catch (Midstream)  10-31 @ 18:32  wound with gram statin --    10-31 @ 17:01  organism  --   10-31 @ 17:01  specimen source .Blood Blood-Peripheral  10-31 @ 17:01      RADIOLOGY & ADDITIONAL TESTS:  no new    Consultant(s) Notes Reviewed:  [x ] YES  [ ] NO    Care Discussed with Consultants/Other Providers [x ] YES  [ ] NO    Advanced care planning discussed with patient and family, advanced care planning forms reviewed, discussed, and completed.  20 minutes spent.

## 2019-11-04 ENCOUNTER — RESULT REVIEW (OUTPATIENT)
Age: 59
End: 2019-11-04

## 2019-11-04 LAB
ANION GAP SERPL CALC-SCNC: 7 MMOL/L — SIGNIFICANT CHANGE UP (ref 5–17)
BUN SERPL-MCNC: 9 MG/DL — SIGNIFICANT CHANGE UP (ref 7–23)
CALCIUM SERPL-MCNC: 8.3 MG/DL — LOW (ref 8.5–10.1)
CHLORIDE SERPL-SCNC: 111 MMOL/L — HIGH (ref 96–108)
CO2 SERPL-SCNC: 26 MMOL/L — SIGNIFICANT CHANGE UP (ref 22–31)
CREAT SERPL-MCNC: 1 MG/DL — SIGNIFICANT CHANGE UP (ref 0.5–1.3)
GLUCOSE SERPL-MCNC: 90 MG/DL — SIGNIFICANT CHANGE UP (ref 70–99)
GRAM STN FLD: SIGNIFICANT CHANGE UP
HCT VFR BLD CALC: 31.4 % — LOW (ref 39–50)
HGB BLD-MCNC: 10.7 G/DL — LOW (ref 13–17)
MCHC RBC-ENTMCNC: 29.5 PG — SIGNIFICANT CHANGE UP (ref 27–34)
MCHC RBC-ENTMCNC: 34.1 GM/DL — SIGNIFICANT CHANGE UP (ref 32–36)
MCV RBC AUTO: 86.5 FL — SIGNIFICANT CHANGE UP (ref 80–100)
NRBC # BLD: 0 /100 WBCS — SIGNIFICANT CHANGE UP (ref 0–0)
PLATELET # BLD AUTO: 256 K/UL — SIGNIFICANT CHANGE UP (ref 150–400)
POTASSIUM SERPL-MCNC: 3.6 MMOL/L — SIGNIFICANT CHANGE UP (ref 3.5–5.3)
POTASSIUM SERPL-SCNC: 3.6 MMOL/L — SIGNIFICANT CHANGE UP (ref 3.5–5.3)
RBC # BLD: 3.63 M/UL — LOW (ref 4.2–5.8)
RBC # FLD: 12.8 % — SIGNIFICANT CHANGE UP (ref 10.3–14.5)
SODIUM SERPL-SCNC: 144 MMOL/L — SIGNIFICANT CHANGE UP (ref 135–145)
SPECIMEN SOURCE: SIGNIFICANT CHANGE UP
WBC # BLD: 4.89 K/UL — SIGNIFICANT CHANGE UP (ref 3.8–10.5)
WBC # FLD AUTO: 4.89 K/UL — SIGNIFICANT CHANGE UP (ref 3.8–10.5)

## 2019-11-04 PROCEDURE — 88305 TISSUE EXAM BY PATHOLOGIST: CPT | Mod: 26

## 2019-11-04 PROCEDURE — 88108 CYTOPATH CONCENTRATE TECH: CPT | Mod: 26

## 2019-11-04 RX ORDER — SODIUM CHLORIDE 9 MG/ML
1000 INJECTION INTRAMUSCULAR; INTRAVENOUS; SUBCUTANEOUS
Refills: 0 | Status: DISCONTINUED | OUTPATIENT
Start: 2019-11-04 | End: 2019-11-05

## 2019-11-04 RX ORDER — OXYCODONE AND ACETAMINOPHEN 5; 325 MG/1; MG/1
1 TABLET ORAL EVERY 4 HOURS
Refills: 0 | Status: DISCONTINUED | OUTPATIENT
Start: 2019-11-04 | End: 2019-11-05

## 2019-11-04 RX ADMIN — Medication 30 MILLIGRAM(S): at 21:08

## 2019-11-04 RX ADMIN — AZITHROMYCIN 255 MILLIGRAM(S): 500 TABLET, FILM COATED ORAL at 15:27

## 2019-11-04 RX ADMIN — CEFTRIAXONE 100 MILLIGRAM(S): 500 INJECTION, POWDER, FOR SOLUTION INTRAMUSCULAR; INTRAVENOUS at 14:39

## 2019-11-04 RX ADMIN — Medication 2 MILLIGRAM(S): at 21:08

## 2019-11-04 RX ADMIN — ATORVASTATIN CALCIUM 20 MILLIGRAM(S): 80 TABLET, FILM COATED ORAL at 21:08

## 2019-11-04 RX ADMIN — OXYCODONE AND ACETAMINOPHEN 1 TABLET(S): 5; 325 TABLET ORAL at 21:08

## 2019-11-04 RX ADMIN — Medication 3 MILLIGRAM(S): at 21:08

## 2019-11-04 RX ADMIN — Medication 30 MILLIGRAM(S): at 05:02

## 2019-11-04 RX ADMIN — SODIUM CHLORIDE 100 MILLILITER(S): 9 INJECTION INTRAMUSCULAR; INTRAVENOUS; SUBCUTANEOUS at 14:38

## 2019-11-04 RX ADMIN — PANTOPRAZOLE SODIUM 40 MILLIGRAM(S): 20 TABLET, DELAYED RELEASE ORAL at 05:03

## 2019-11-04 RX ADMIN — OXYCODONE AND ACETAMINOPHEN 1 TABLET(S): 5; 325 TABLET ORAL at 22:00

## 2019-11-04 RX ADMIN — CLOZAPINE 200 MILLIGRAM(S): 150 TABLET, ORALLY DISINTEGRATING ORAL at 21:08

## 2019-11-04 RX ADMIN — Medication 2 SPRAY(S): at 14:40

## 2019-11-04 NOTE — SWALLOW VFSS/MBS ASSESSMENT ADULT - COMMENTS
Consult received and chart reviewed. Modified Barium Swallow Study ordered, however, patient currently NPO after midnight for bronchoscopy to be perfromed this afternoon 11/4. MBSS to be performed on 11/5 when patient must no longer remain NPO. Discussed with RN and radiology department. Consult received and chart reviewed. Modified Barium Swallow Study ordered, however, patient currently NPO after midnight for bronchoscopy to be performed this afternoon 11/4. MBSS to be performed on 11/5 when patient must no longer remain NPO. Discussed with RN and radiology department.

## 2019-11-04 NOTE — PROVIDER CONTACT NOTE (OTHER) - ACTION/TREATMENT ORDERED:
awaiting for call back awaiting for call back  [id number]> Dr. Courtney called back , stating " I have spoke to the mother already" offer cough meds if pt. want s, keep on humidifier O 2 , no new orders at this time.

## 2019-11-04 NOTE — PROCEDURE NOTE - ADDITIONAL PROCEDURE DETAILS
Bronchoscopy done under general anesthesia - with Anesthesia MD at the bedside  pt tolerated the procedure well  copious secretions - upper and lower airway  cobblestoning - c/w Post nasal drip  left lung - normal appearance  right lung - RUL - atelectasis - edema of airway - local irritation   Bruch and Wash done RUL  specimens sent for analysis - Micro and Path  discussed with pt and mother

## 2019-11-04 NOTE — PROGRESS NOTE ADULT - PROBLEM SELECTOR PROBLEM 1
Pneumonia due to infectious organism, unspecified laterality, unspecified part of lung Spine appears normal, range of motion is not limited, no muscle or joint tenderness

## 2019-11-04 NOTE — PROGRESS NOTE ADULT - SUBJECTIVE AND OBJECTIVE BOX
Patient is a 59y old  Male who presents with a chief complaint of fever and not feeling well with cough (04 Nov 2019 06:02)      INTERVAL HPI/OVERNIGHT EVENTS: still with cough and lethargic    MEDICATIONS  (STANDING):  atorvastatin 20 milliGRAM(s) Oral at bedtime  azithromycin  IVPB 500 milliGRAM(s) IV Intermittent every 24 hours  cefTRIAXone   IVPB 1000 milliGRAM(s) IV Intermittent every 24 hours  clonazePAM  Tablet 2 milliGRAM(s) Oral at bedtime  cloZAPine 200 milliGRAM(s) Oral at bedtime  enoxaparin Injectable 40 milliGRAM(s) SubCutaneous daily  fluticasone propionate 50 MICROgram(s)/spray Nasal Spray 2 Spray(s) Both Nostrils daily  melatonin 3 milliGRAM(s) Oral at bedtime  pantoprazole    Tablet 40 milliGRAM(s) Oral before breakfast  PARoxetine 30 milliGRAM(s) Oral two times a day  sodium chloride 0.9%. 1000 milliLiter(s) (100 mL/Hr) IV Continuous <Continuous>  verapamil  milliGRAM(s) Oral daily    MEDICATIONS  (PRN):  acetaminophen   Tablet .. 650 milliGRAM(s) Oral every 6 hours PRN Temp greater or equal to 38C (100.4F), Moderate Pain (4 - 6)  guaiFENesin   Syrup  (Sugar-Free) 200 milliGRAM(s) Oral every 6 hours PRN Cough      Allergies    No Known Allergies    Intolerances        REVIEW OF SYSTEMS:  CONSTITUTIONAL: No fever, weight loss, or fatigue  EYES: No eye pain, visual disturbances  ENMT:  No difficulty hearing, tinnitus, vertigo; No sinus or throat pain  NECK: No pain or stiffness  RESPIRATORY: no sob, cough  CARDIOVASCULAR: No chest pain, palpitations, dizziness  GASTROINTESTINAL: No abdominal or epigastric pain. No nausea, vomiting, or hematemesis; No diarrhea or constipation. No melena or hematochezia.  GENITOURINARY: No dysuria, frequency, hematuria, or incontinence  NEUROLOGICAL: No headaches, memory loss, loss of strength, numbness, or tremors  SKIN: No itching, burning  LYMPH NODES: No enlarged glands  MUSCULOSKELETAL: No joint pain or swelling; No muscle, back, or extremity pain  PSYCHIATRIC: No depression, mood swings  HEME/LYMPH: No easy bruising, or bleeding gums  ALLERGY AND IMMUNOLOGIC: No hives    Vital Signs Last 24 Hrs  T(C): 36.8 (04 Nov 2019 04:38), Max: 37.4 (03 Nov 2019 21:09)  T(F): 98.3 (04 Nov 2019 04:38), Max: 99.4 (03 Nov 2019 21:09)  HR: 74 (04 Nov 2019 04:38) (72 - 79)  BP: 100/61 (04 Nov 2019 04:38) (96/57 - 116/78)  BP(mean): --  RR: 18 (04 Nov 2019 04:38) (18 - 18)  SpO2: 92% (04 Nov 2019 04:38) (92% - 95%)    PHYSICAL EXAM:  GENERAL: NAD, well-groomed, well-developed  HEAD:  Atraumatic, Normocephalic  EYES: EOMI, PERRLA, conjunctiva and sclera clear  ENMT: No tonsillar erythema, exudates, or enlargement   NECK: Supple, No JVD  NERVOUS SYSTEM:  Alert & Oriented X3, Good concentration  CHEST/LUNG: Clear to auscultation bilaterally; No rales, rhonchi, wheezing  HEART: Regular rate and rhythm  ABDOMEN: Soft, Nontender, Nondistended; Bowel sounds present  EXTREMITIES:  2+ Peripheral Pulses   LYMPH: No lymphadenopathy noted  SKIN: No rashes     LABS:                        10.7   4.89  )-----------( 256      ( 04 Nov 2019 07:54 )             31.4     04 Nov 2019 07:54    144    |  111    |  9      ----------------------------<  90     3.6     |  26     |  1.00     Ca    8.3        04 Nov 2019 07:54          CAPILLARY BLOOD GLUCOSE        blood culture --   No growth   10-31 @ 18:32    blood culture --   No growth to date.   10-31 @ 17:01      urine culture --  10-31 @ 18:32  results   No growth 10-31 @ 18:32  urine culture --  10-31 @ 17:01  results   No growth to date. 10-31 @ 17:01    wound with gram statin --    10-31 @ 18:32  organism  --   10-31 @ 18:32  specimen source .Urine Clean Catch (Midstream)  10-31 @ 18:32  wound with gram statin --    10-31 @ 17:01  organism  --   10-31 @ 17:01  specimen source .Blood Blood-Peripheral  10-31 @ 17:01      RADIOLOGY & ADDITIONAL TESTS:  no new      Consultant(s) Notes Reviewed:  [x ] YES  [ ] NO    Care Discussed with Consultants/Other Providers [ x] YES  [ ] NO    Advanced care planning discussed with patient and family, advanced care planning forms reviewed, discussed, and completed.  20 minutes spent.

## 2019-11-04 NOTE — PROGRESS NOTE ADULT - SUBJECTIVE AND OBJECTIVE BOX
Date/Time Patient Seen:  		  Referring MD:   Data Reviewed	       Patient is a 59y old  Male who presents with a chief complaint of fever and not feeling well with cough (03 Nov 2019 09:58)      Subjective/HPI     PAST MEDICAL & SURGICAL HISTORY:  H/O pleural effusion: DX IN 2011 when pt had pneumonia  chronic condition now  CIRILO (obstructive sleep apnea)  Obesity  Migraines  Pneumonia: 2011  Smoking hx  H/O ETOH abuse: sober x 20 yrs  Drug abuse, in remission: sober x 20 yrs  HLD (hyperlipidemia)  Schizophrenia: pt never stated he had schizophrenia during interview but is on medication for it.  Skin lesion: face   &quot;pre-cancerous&quot;  Cyst: scalp excised 20 yrs ago        Medication list         MEDICATIONS  (STANDING):  atorvastatin 20 milliGRAM(s) Oral at bedtime  azithromycin  IVPB 500 milliGRAM(s) IV Intermittent every 24 hours  cefTRIAXone   IVPB 1000 milliGRAM(s) IV Intermittent every 24 hours  clonazePAM  Tablet 2 milliGRAM(s) Oral at bedtime  cloZAPine 200 milliGRAM(s) Oral at bedtime  enoxaparin Injectable 40 milliGRAM(s) SubCutaneous daily  fluticasone propionate 50 MICROgram(s)/spray Nasal Spray 2 Spray(s) Both Nostrils daily  melatonin 3 milliGRAM(s) Oral at bedtime  pantoprazole    Tablet 40 milliGRAM(s) Oral before breakfast  PARoxetine 30 milliGRAM(s) Oral two times a day  sodium chloride 0.9%. 1000 milliLiter(s) (45 mL/Hr) IV Continuous <Continuous>  verapamil  milliGRAM(s) Oral daily    MEDICATIONS  (PRN):  acetaminophen   Tablet .. 650 milliGRAM(s) Oral every 6 hours PRN Temp greater or equal to 38C (100.4F), Moderate Pain (4 - 6)  guaiFENesin   Syrup  (Sugar-Free) 200 milliGRAM(s) Oral every 6 hours PRN Cough         Vitals log        ICU Vital Signs Last 24 Hrs  T(C): 36.8 (04 Nov 2019 04:38), Max: 37.4 (03 Nov 2019 21:09)  T(F): 98.3 (04 Nov 2019 04:38), Max: 99.4 (03 Nov 2019 21:09)  HR: 74 (04 Nov 2019 04:38) (72 - 79)  BP: 100/61 (04 Nov 2019 04:38) (96/57 - 116/78)  BP(mean): --  ABP: --  ABP(mean): --  RR: 18 (04 Nov 2019 04:38) (18 - 18)  SpO2: 92% (04 Nov 2019 04:38) (92% - 95%)           Input and Output:  I&O's Detail    02 Nov 2019 08:01  -  03 Nov 2019 07:00  --------------------------------------------------------  IN:    Solution: 250 mL    Solution: 50 mL  Total IN: 300 mL    OUT:    Voided: 1050 mL  Total OUT: 1050 mL    Total NET: -750 mL      03 Nov 2019 07:01  -  04 Nov 2019 06:02  --------------------------------------------------------  IN:    sodium chloride 0.9%.: 1000 mL  Total IN: 1000 mL    OUT:    Voided: 300 mL  Total OUT: 300 mL    Total NET: 700 mL          Lab Data                        10.5   4.91  )-----------( 241      ( 03 Nov 2019 07:08 )             30.8     11-03    142  |  110<H>  |  10  ----------------------------<  88  3.5   |  25  |  1.10    Ca    8.4<L>      03 Nov 2019 07:08              Review of Systems	      Objective     Physical Examination    heart s1s2  lung dec BS  abd soft  obese    Pertinent Lab findings & Imaging      Landon:  NO   Adequate UO     I&O's Detail    02 Nov 2019 08:01  -  03 Nov 2019 07:00  --------------------------------------------------------  IN:    Solution: 250 mL    Solution: 50 mL  Total IN: 300 mL    OUT:    Voided: 1050 mL  Total OUT: 1050 mL    Total NET: -750 mL      03 Nov 2019 07:01  -  04 Nov 2019 06:02  --------------------------------------------------------  IN:    sodium chloride 0.9%.: 1000 mL  Total IN: 1000 mL    OUT:    Voided: 300 mL  Total OUT: 300 mL    Total NET: 700 mL               Discussed with:     Cultures:	        Radiology

## 2019-11-04 NOTE — PROGRESS NOTE ADULT - PROBLEM SELECTOR PLAN 1
recurrent PNA  obesity  poor dentition  planned for Bronch today at noon  oral hygiene  on Dual ABX regimen  out of bed  will follow and monitor  vs and meds and labs and imaging reviewed

## 2019-11-05 LAB
CULTURE RESULTS: SIGNIFICANT CHANGE UP
CULTURE RESULTS: SIGNIFICANT CHANGE UP
NT-PROBNP SERPL-SCNC: 198 PG/ML — HIGH (ref 0–125)
SPECIMEN SOURCE: SIGNIFICANT CHANGE UP
SPECIMEN SOURCE: SIGNIFICANT CHANGE UP

## 2019-11-05 PROCEDURE — 74230 X-RAY XM SWLNG FUNCJ C+: CPT | Mod: 26

## 2019-11-05 RX ADMIN — Medication 200 MILLIGRAM(S): at 12:15

## 2019-11-05 RX ADMIN — Medication 3 MILLIGRAM(S): at 21:19

## 2019-11-05 RX ADMIN — Medication 30 MILLIGRAM(S): at 18:37

## 2019-11-05 RX ADMIN — SODIUM CHLORIDE 100 MILLILITER(S): 9 INJECTION INTRAMUSCULAR; INTRAVENOUS; SUBCUTANEOUS at 05:25

## 2019-11-05 RX ADMIN — PANTOPRAZOLE SODIUM 40 MILLIGRAM(S): 20 TABLET, DELAYED RELEASE ORAL at 05:25

## 2019-11-05 RX ADMIN — Medication 30 MILLIGRAM(S): at 05:25

## 2019-11-05 RX ADMIN — Medication 2 MILLIGRAM(S): at 21:19

## 2019-11-05 RX ADMIN — ATORVASTATIN CALCIUM 20 MILLIGRAM(S): 80 TABLET, FILM COATED ORAL at 21:19

## 2019-11-05 RX ADMIN — Medication 2 SPRAY(S): at 12:11

## 2019-11-05 RX ADMIN — CLOZAPINE 200 MILLIGRAM(S): 150 TABLET, ORALLY DISINTEGRATING ORAL at 21:19

## 2019-11-05 RX ADMIN — CEFTRIAXONE 100 MILLIGRAM(S): 500 INJECTION, POWDER, FOR SOLUTION INTRAMUSCULAR; INTRAVENOUS at 12:11

## 2019-11-05 NOTE — SWALLOW VFSS/MBS ASSESSMENT ADULT - RECOMMENDED CONSISTENCY
1. Soft solids with thin liquids, as tolerated  2. Small bites/sips  3. Upright positioning during and ~30 min post meals  4. Aspiration precautions  5. Strict oral care

## 2019-11-05 NOTE — SWALLOW VFSS/MBS ASSESSMENT ADULT - DEMONSTRATES NEED FOR REFERRAL TO ANOTHER SERVICE
To facilitate adequate daily caloric intake, as patient is reporting decreased appetite./Registered Dietitian

## 2019-11-05 NOTE — PROGRESS NOTE ADULT - PROBLEM SELECTOR PLAN 1
recurrent PNA - CT reviewed - s/p Bronch - path and micro pending -   dysphagia diet  consider tapering IVF - monitor for volume overload -   cough - robitussin - PRN -   keep HOB elev  MBS planned for today to eval Dysphagia -   spoke with sister and mother of the patient   oral hygiene  skin care  assist with ADL  labs and imaging reviewed  will follow

## 2019-11-05 NOTE — PROGRESS NOTE ADULT - SUBJECTIVE AND OBJECTIVE BOX
Patient is a 59y old  Male who presents with a chief complaint of fever and not feeling well with cough (05 Nov 2019 06:36)      INTERVAL HPI/OVERNIGHT EVENTS: chart noted, s/p bronch, will fu grace dela cruz planning    MEDICATIONS  (STANDING):  atorvastatin 20 milliGRAM(s) Oral at bedtime  cefTRIAXone   IVPB 1000 milliGRAM(s) IV Intermittent every 24 hours  clonazePAM  Tablet 2 milliGRAM(s) Oral at bedtime  cloZAPine 200 milliGRAM(s) Oral at bedtime  enoxaparin Injectable 40 milliGRAM(s) SubCutaneous daily  fluticasone propionate 50 MICROgram(s)/spray Nasal Spray 2 Spray(s) Both Nostrils daily  melatonin 3 milliGRAM(s) Oral at bedtime  pantoprazole    Tablet 40 milliGRAM(s) Oral before breakfast  PARoxetine 30 milliGRAM(s) Oral two times a day  verapamil  milliGRAM(s) Oral daily    MEDICATIONS  (PRN):  acetaminophen   Tablet .. 650 milliGRAM(s) Oral every 6 hours PRN Temp greater or equal to 38C (100.4F), Moderate Pain (4 - 6)  guaiFENesin   Syrup  (Sugar-Free) 200 milliGRAM(s) Oral every 6 hours PRN Cough      Allergies    No Known Allergies    Intolerances        REVIEW OF SYSTEMS:  CONSTITUTIONAL: No fever, weight loss, or fatigue  EYES: No eye pain, visual disturbances  ENMT:  No difficulty hearing, tinnitus, vertigo; No sinus or throat pain  NECK: No pain or stiffness  RESPIRATORY: cough, no sob  CARDIOVASCULAR: No chest pain, palpitations, dizziness  GASTROINTESTINAL: No abdominal or epigastric pain. No nausea, vomiting, or hematemesis; No diarrhea or constipation. No melena or hematochezia.  GENITOURINARY: No dysuria, frequency, hematuria, or incontinence  NEUROLOGICAL: No headaches, memory loss, loss of strength, numbness, or tremors  SKIN: No itching, burning  LYMPH NODES: No enlarged glands  MUSCULOSKELETAL: No joint pain or swelling; No muscle, back, or extremity pain  PSYCHIATRIC: No depression, mood swings  HEME/LYMPH: No easy bruising, or bleeding gums  ALLERGY AND IMMUNOLOGIC: No hives    Vital Signs Last 24 Hrs  T(C): 36.5 (05 Nov 2019 05:13), Max: 37.1 (04 Nov 2019 16:33)  T(F): 97.7 (05 Nov 2019 05:13), Max: 98.8 (04 Nov 2019 16:33)  HR: 66 (05 Nov 2019 05:13) (66 - 100)  BP: 102/68 (05 Nov 2019 05:13) (96/69 - 119/73)  BP(mean): --  RR: 18 (05 Nov 2019 05:13) (14 - 25)  SpO2: 99% (05 Nov 2019 05:13) (90% - 99%)    PHYSICAL EXAM:  GENERAL: NAD, well-groomed, well-developed  HEAD:  Atraumatic, Normocephalic  EYES: EOMI, PERRLA, conjunctiva and sclera clear  ENMT: No tonsillar erythema, exudates, or enlargement   NECK: Supple, No JVD  NERVOUS SYSTEM:  Alert & Oriented   CHEST/LUNG: Clear to auscultation bilaterally; No rales, rhonchi, wheezing  HEART: Regular rate and rhythm  ABDOMEN: Soft, Nontender, Nondistended; Bowel sounds present  EXTREMITIES:  2+ Peripheral Pulses   LYMPH: No lymphadenopathy noted  SKIN: No rashes     LABS:      Ca    8.3        04 Nov 2019 07:54          CAPILLARY BLOOD GLUCOSE        blood culture --   Testing in progress   11-04 @ 21:03    blood culture --   No growth   10-31 @ 18:32    blood culture --   No growth to date.   10-31 @ 17:01      urine culture --  11-04 @ 21:03  results   Testing in progress 11-04 @ 21:03  urine culture --  10-31 @ 18:32  results   No growth 10-31 @ 18:32  urine culture --  10-31 @ 17:01  results   No growth to date. 10-31 @ 17:01    wound with gram statin --    11-04 @ 21:03  organism  --   11-04 @ 21:03  specimen source Bronch Wash Bronchoalveolar Washings  11-04 @ 21:03  wound with gram statin --    10-31 @ 18:32  organism  --   10-31 @ 18:32  specimen source .Urine Clean Catch (Midstream)  10-31 @ 18:32  wound with gram statin --    10-31 @ 17:01  organism  --   10-31 @ 17:01  specimen source .Blood Blood-Peripheral  10-31 @ 17:01      RADIOLOGY & ADDITIONAL TESTS:  no new      Consultant(s) Notes Reviewed:  [x ] YES  [ ] NO    Care Discussed with Consultants/Other Providers [x ] YES  [ ] NO    Advanced care planning discussed with patient and family, advanced care planning forms reviewed, discussed, and completed.  20 minutes spent.

## 2019-11-05 NOTE — SWALLOW VFSS/MBS ASSESSMENT ADULT - NS SWALLOW VFSS REC ASPIR MON
cough/position upright (90Y)/gurgly voice/pneumonia/throat clearing/upper respiratory infection/oral hygiene/change of breathing pattern/fever

## 2019-11-05 NOTE — SWALLOW VFSS/MBS ASSESSMENT ADULT - RECOMMENDED FEEDING/EATING TECHNIQUES
position upright (90 degrees)/allow for swallow between intakes/crush medication (when feasible)/provide rest periods between swallows/maintain upright posture during/after eating for 30 mins/oral hygiene/small sips/bites

## 2019-11-05 NOTE — SWALLOW VFSS/MBS ASSESSMENT ADULT - COMMENTS
The patient was received in the radiology suite this AM, at which time he was alert and cooperative. Patient currently receiving supplemental O2 via NC in place. The patient is known to this department as he was initially seen for a bedside swallow evaluation on 10/31/19 (please see full report for details), at which time a soft solid with thin liquid diet and a Modified Barium Swallow Study were recommended.     Per charting, the patient is a "59 y/old  Male who presents with a chief complaint of fever and not feeling well with cough." The patient has been experiencing repeated pneumonias with an unknown cause. Recent CXR revealed, "Prior infiltrate in the right lung has resolved. There is a new, recurrent airspace infiltrate in the right upper lobe consistent with pneumonia. No pleural collection. No change heart mediastinum." Discussed results and recommendations from this evaluation with the patient and call out to MD.

## 2019-11-05 NOTE — SWALLOW VFSS/MBS ASSESSMENT ADULT - DIAGNOSTIC IMPRESSIONS
1. The patient demonstrated functional oral management of puree, nectar thick, and thin liquid textures marked by adequate bolus collection, transfer, and AP transit time.  2. The patient demonstrated a mild oral dysphagia for solids marked by prolonged oral manipulation and decreased mastication abilities likely 2/2 missing dentition resulting in delayed bolus collection, transfer, and AP transit time.  3. The patient demonstrated a mild pharyngeal dysphagia for puree, solid, nectar thick, and thin liquid textures marked by a timely pharyngeal swallow trigger with adequate base of tongue retraction, adequate epiglottic deflection, mildly reduced hyolaryngeal elevation, and adequate pharyngeal contractility resulting in trace stasis noted along the base of tongue and in the vallecula which cleared with a spontaneous re-swallow. No laryngeal penetration/aspiration observed.

## 2019-11-05 NOTE — PROGRESS NOTE ADULT - SUBJECTIVE AND OBJECTIVE BOX
Date/Time Patient Seen:  		  Referring MD:   Data Reviewed	       Patient is a 59y old  Male who presents with a chief complaint of fever and not feeling well with cough (04 Nov 2019 11:01)      Subjective/HPI     PAST MEDICAL & SURGICAL HISTORY:  H/O pleural effusion: DX IN 2011 when pt had pneumonia  chronic condition now  CIRILO (obstructive sleep apnea)  Obesity  Migraines  Pneumonia: 2011  Smoking hx  H/O ETOH abuse: sober x 20 yrs  Drug abuse, in remission: sober x 20 yrs  HLD (hyperlipidemia)  Schizophrenia: pt never stated he had schizophrenia during interview but is on medication for it.  Skin lesion: face   &quot;pre-cancerous&quot;  Cyst: scalp excised 20 yrs ago        Medication list         MEDICATIONS  (STANDING):  atorvastatin 20 milliGRAM(s) Oral at bedtime  cefTRIAXone   IVPB 1000 milliGRAM(s) IV Intermittent every 24 hours  clonazePAM  Tablet 2 milliGRAM(s) Oral at bedtime  cloZAPine 200 milliGRAM(s) Oral at bedtime  enoxaparin Injectable 40 milliGRAM(s) SubCutaneous daily  fluticasone propionate 50 MICROgram(s)/spray Nasal Spray 2 Spray(s) Both Nostrils daily  melatonin 3 milliGRAM(s) Oral at bedtime  pantoprazole    Tablet 40 milliGRAM(s) Oral before breakfast  PARoxetine 30 milliGRAM(s) Oral two times a day  sodium chloride 0.9%. 1000 milliLiter(s) (100 mL/Hr) IV Continuous <Continuous>  verapamil  milliGRAM(s) Oral daily    MEDICATIONS  (PRN):  acetaminophen   Tablet .. 650 milliGRAM(s) Oral every 6 hours PRN Temp greater or equal to 38C (100.4F), Moderate Pain (4 - 6)  guaiFENesin   Syrup  (Sugar-Free) 200 milliGRAM(s) Oral every 6 hours PRN Cough  oxycodone    5 mG/acetaminophen 325 mG 1 Tablet(s) Oral every 4 hours PRN Severe Pain (7 - 10)         Vitals log        ICU Vital Signs Last 24 Hrs  T(C): 36.5 (05 Nov 2019 05:13), Max: 37.1 (04 Nov 2019 16:33)  T(F): 97.7 (05 Nov 2019 05:13), Max: 98.8 (04 Nov 2019 16:33)  HR: 66 (05 Nov 2019 05:13) (66 - 100)  BP: 102/68 (05 Nov 2019 05:13) (96/69 - 119/73)  BP(mean): --  ABP: --  ABP(mean): --  RR: 18 (05 Nov 2019 05:13) (14 - 25)  SpO2: 99% (05 Nov 2019 05:13) (90% - 99%)           Input and Output:  I&O's Detail    03 Nov 2019 07:01  -  04 Nov 2019 07:00  --------------------------------------------------------  IN:    sodium chloride 0.9%: 1200 mL  Total IN: 1200 mL    OUT:    Voided: 300 mL  Total OUT: 300 mL    Total NET: 900 mL      04 Nov 2019 07:01  -  05 Nov 2019 06:36  --------------------------------------------------------  IN:    sodium chloride 0.9%.: 1200 mL  Total IN: 1200 mL    OUT:    Voided: 1100 mL  Total OUT: 1100 mL    Total NET: 100 mL          Lab Data                        10.7   4.89  )-----------( 256      ( 04 Nov 2019 07:54 )             31.4     11-04    144  |  111<H>  |  9   ----------------------------<  90  3.6   |  26  |  1.00    Ca    8.3<L>      04 Nov 2019 07:54              Review of Systems	      Objective     Physical Examination    heart 1s2  lung dec BS  abd soft  poor dentition      Pertinent Lab findings & Imaging      Landon:  NO   Adequate UO     I&O's Detail    03 Nov 2019 07:01  -  04 Nov 2019 07:00  --------------------------------------------------------  IN:    sodium chloride 0.9%: 1200 mL  Total IN: 1200 mL    OUT:    Voided: 300 mL  Total OUT: 300 mL    Total NET: 900 mL      04 Nov 2019 07:01  -  05 Nov 2019 06:36  --------------------------------------------------------  IN:    sodium chloride 0.9%.: 1200 mL  Total IN: 1200 mL    OUT:    Voided: 1100 mL  Total OUT: 1100 mL    Total NET: 100 mL               Discussed with:     Cultures:	  Culture Results:   Testing in progress (11-04 @ 21:03)        Radiology

## 2019-11-06 ENCOUNTER — TRANSCRIPTION ENCOUNTER (OUTPATIENT)
Age: 59
End: 2019-11-06

## 2019-11-06 VITALS
RESPIRATION RATE: 18 BRPM | SYSTOLIC BLOOD PRESSURE: 123 MMHG | DIASTOLIC BLOOD PRESSURE: 79 MMHG | TEMPERATURE: 98 F | HEART RATE: 67 BPM | WEIGHT: 225.53 LBS | OXYGEN SATURATION: 95 %

## 2019-11-06 LAB
CULTURE RESULTS: SIGNIFICANT CHANGE UP
SPECIMEN SOURCE: SIGNIFICANT CHANGE UP

## 2019-11-06 PROCEDURE — 87040 BLOOD CULTURE FOR BACTERIA: CPT

## 2019-11-06 PROCEDURE — 92610 EVALUATE SWALLOWING FUNCTION: CPT

## 2019-11-06 PROCEDURE — 93005 ELECTROCARDIOGRAM TRACING: CPT

## 2019-11-06 PROCEDURE — 97161 PT EVAL LOW COMPLEX 20 MIN: CPT

## 2019-11-06 PROCEDURE — 74230 X-RAY XM SWLNG FUNCJ C+: CPT

## 2019-11-06 PROCEDURE — 99285 EMERGENCY DEPT VISIT HI MDM: CPT | Mod: 25

## 2019-11-06 PROCEDURE — 83605 ASSAY OF LACTIC ACID: CPT

## 2019-11-06 PROCEDURE — A9698: CPT

## 2019-11-06 PROCEDURE — 88108 CYTOPATH CONCENTRATE TECH: CPT

## 2019-11-06 PROCEDURE — 85027 COMPLETE CBC AUTOMATED: CPT

## 2019-11-06 PROCEDURE — 71046 X-RAY EXAM CHEST 2 VIEWS: CPT

## 2019-11-06 PROCEDURE — 87070 CULTURE OTHR SPECIMN AEROBIC: CPT

## 2019-11-06 PROCEDURE — 87631 RESP VIRUS 3-5 TARGETS: CPT

## 2019-11-06 PROCEDURE — 94760 N-INVAS EAR/PLS OXIMETRY 1: CPT

## 2019-11-06 PROCEDURE — 87086 URINE CULTURE/COLONY COUNT: CPT

## 2019-11-06 PROCEDURE — 88305 TISSUE EXAM BY PATHOLOGIST: CPT

## 2019-11-06 PROCEDURE — 96365 THER/PROPH/DIAG IV INF INIT: CPT

## 2019-11-06 PROCEDURE — 80048 BASIC METABOLIC PNL TOTAL CA: CPT

## 2019-11-06 PROCEDURE — 81001 URINALYSIS AUTO W/SCOPE: CPT

## 2019-11-06 PROCEDURE — 83880 ASSAY OF NATRIURETIC PEPTIDE: CPT

## 2019-11-06 PROCEDURE — 92611 MOTION FLUOROSCOPY/SWALLOW: CPT

## 2019-11-06 PROCEDURE — 36415 COLL VENOUS BLD VENIPUNCTURE: CPT

## 2019-11-06 PROCEDURE — 87102 FUNGUS ISOLATION CULTURE: CPT

## 2019-11-06 PROCEDURE — 84443 ASSAY THYROID STIM HORMONE: CPT

## 2019-11-06 PROCEDURE — 82803 BLOOD GASES ANY COMBINATION: CPT

## 2019-11-06 PROCEDURE — 96367 TX/PROPH/DG ADDL SEQ IV INF: CPT

## 2019-11-06 PROCEDURE — 80053 COMPREHEN METABOLIC PANEL: CPT

## 2019-11-06 PROCEDURE — 94640 AIRWAY INHALATION TREATMENT: CPT

## 2019-11-06 RX ORDER — PANTOPRAZOLE SODIUM 20 MG/1
1 TABLET, DELAYED RELEASE ORAL
Qty: 30 | Refills: 0
Start: 2019-11-06 | End: 2019-12-05

## 2019-11-06 RX ORDER — CIPROFLOXACIN LACTATE 400MG/40ML
1 VIAL (ML) INTRAVENOUS
Qty: 3 | Refills: 0
Start: 2019-11-06 | End: 2019-11-08

## 2019-11-06 RX ADMIN — PANTOPRAZOLE SODIUM 40 MILLIGRAM(S): 20 TABLET, DELAYED RELEASE ORAL at 05:12

## 2019-11-06 RX ADMIN — Medication 120 MILLIGRAM(S): at 05:12

## 2019-11-06 RX ADMIN — Medication 30 MILLIGRAM(S): at 05:12

## 2019-11-06 RX ADMIN — Medication 2 SPRAY(S): at 11:37

## 2019-11-06 NOTE — DISCHARGE NOTE NURSING/CASE MANAGEMENT/SOCIAL WORK - PATIENT PORTAL LINK FT
You can access the FollowMyHealth Patient Portal offered by [facility] by registering at the following website: [email or web address]. By joining [employer]’s FollowMyHealth portal, you will also be able to view your health information using other applications (apps) compatible with our system.

## 2019-11-06 NOTE — DISCHARGE NOTE PROVIDER - HOSPITAL COURSE
pt admitted for recurrent pna, had bronch done showing aspiration causing recurrent pna.    was also seen by speech and swallow and pulm.    his diet consistency was adjusted, he is responding to iv abx and is changed to oral abx    he is being discharged on oral abx and will fu with his pulm in community    he will also need sleep studies as outpt.    >35 minutes spent on discharge

## 2019-11-06 NOTE — DISCHARGE NOTE PROVIDER - NSDCMRMEDTOKEN_GEN_ALL_CORE_FT
Clozaril 100 mg oral tablet: 2 tab(s) orally once a day (in the evening)  ipratropium 42 mcg/inh (0.06%) nasal spray: 2 spray(s) nasal every 12 hours  KlonoPIN 2 mg oral tablet: 1 tab(s) orally once a day (in the evening)  Levaquin 500 mg oral tablet: 1 tab(s) orally every 24 hours   Nasonex 50 mcg/inh nasal spray: 2 spray(s) nasal once a day  pantoprazole 40 mg oral delayed release tablet: 1 tab(s) orally once a day (before a meal)  Paxil 30 mg oral tablet: 2 tab(s) orally once a day  ProAir HFA 90 mcg/inh inhalation aerosol: 2 puff(s) inhaled 4 times a day  rosuvastatin 5 mg oral tablet: 1 tab(s) orally once a day (at bedtime)  verapamil 120 mg/24 hours oral capsule, extended release: 1 cap(s) orally once a day

## 2019-11-06 NOTE — PROGRESS NOTE ADULT - SUBJECTIVE AND OBJECTIVE BOX
Date/Time Patient Seen:  		  Referring MD:   Data Reviewed	       Patient is a 59y old  Male who presents with a chief complaint of fever and not feeling well with cough (05 Nov 2019 11:07)      Subjective/HPI     PAST MEDICAL & SURGICAL HISTORY:  H/O pleural effusion: DX IN 2011 when pt had pneumonia  chronic condition now  CIRILO (obstructive sleep apnea)  Obesity  Migraines  Pneumonia: 2011  Smoking hx  H/O ETOH abuse: sober x 20 yrs  Drug abuse, in remission: sober x 20 yrs  HLD (hyperlipidemia)  Schizophrenia: pt never stated he had schizophrenia during interview but is on medication for it.  Skin lesion: face   &quot;pre-cancerous&quot;  Cyst: scalp excised 20 yrs ago        Medication list         MEDICATIONS  (STANDING):  atorvastatin 20 milliGRAM(s) Oral at bedtime  clonazePAM  Tablet 2 milliGRAM(s) Oral at bedtime  cloZAPine 200 milliGRAM(s) Oral at bedtime  enoxaparin Injectable 40 milliGRAM(s) SubCutaneous daily  fluticasone propionate 50 MICROgram(s)/spray Nasal Spray 2 Spray(s) Both Nostrils daily  melatonin 3 milliGRAM(s) Oral at bedtime  pantoprazole    Tablet 40 milliGRAM(s) Oral before breakfast  PARoxetine 30 milliGRAM(s) Oral two times a day  verapamil  milliGRAM(s) Oral daily    MEDICATIONS  (PRN):  acetaminophen   Tablet .. 650 milliGRAM(s) Oral every 6 hours PRN Temp greater or equal to 38C (100.4F), Moderate Pain (4 - 6)  guaiFENesin   Syrup  (Sugar-Free) 200 milliGRAM(s) Oral every 6 hours PRN Cough         Vitals log        ICU Vital Signs Last 24 Hrs  T(C): 36.7 (06 Nov 2019 04:58), Max: 37.1 (05 Nov 2019 20:11)  T(F): 98.1 (06 Nov 2019 04:58), Max: 98.7 (05 Nov 2019 20:11)  HR: 67 (06 Nov 2019 04:58) (67 - 92)  BP: 123/79 (06 Nov 2019 04:58) (111/70 - 123/79)  BP(mean): --  ABP: --  ABP(mean): --  RR: 18 (06 Nov 2019 04:58) (17 - 18)  SpO2: 95% (06 Nov 2019 04:58) (95% - 98%)           Input and Output:  I&O's Detail    04 Nov 2019 07:01  -  05 Nov 2019 07:00  --------------------------------------------------------  IN:    sodium chloride 0.9%: 1200 mL  Total IN: 1200 mL    OUT:    Voided: 1100 mL  Total OUT: 1100 mL    Total NET: 100 mL      05 Nov 2019 07:01  -  06 Nov 2019 06:06  --------------------------------------------------------  IN:    Oral Fluid: 200 mL    Solution: 50 mL  Total IN: 250 mL    OUT:    Voided: 700 mL  Total OUT: 700 mL    Total NET: -450 mL          Lab Data                        10.7   4.89  )-----------( 256      ( 04 Nov 2019 07:54 )             31.4     11-04    144  |  111<H>  |  9   ----------------------------<  90  3.6   |  26  |  1.00    Ca    8.3<L>      04 Nov 2019 07:54              Review of Systems	      Objective     Physical Examination  heart s1s2  lung dec BS  abd soft  on room air        Pertinent Lab findings & Imaging      Landon:  NO   Adequate UO     I&O's Detail    04 Nov 2019 07:01  -  05 Nov 2019 07:00  --------------------------------------------------------  IN:    sodium chloride 0.9%: 1200 mL  Total IN: 1200 mL    OUT:    Voided: 1100 mL  Total OUT: 1100 mL    Total NET: 100 mL      05 Nov 2019 07:01  -  06 Nov 2019 06:06  --------------------------------------------------------  IN:    Oral Fluid: 200 mL    Solution: 50 mL  Total IN: 250 mL    OUT:    Voided: 700 mL  Total OUT: 700 mL    Total NET: -450 mL               Discussed with:     Cultures:	        Radiology

## 2019-11-06 NOTE — DISCHARGE NOTE PROVIDER - CARE PROVIDER_API CALL
Ananya Guerra)  Critical Care Medicine; Internal Medicine; Pulmonary Disease  [address], [address]  [place] [address]  Phone: [phone]  Fax: [phone]  Follow Up Time: 1 week

## 2019-11-06 NOTE — PROGRESS NOTE ADULT - REASON FOR ADMISSION
fever and not feeling well with cough

## 2019-11-06 NOTE — DISCHARGE NOTE PROVIDER - NSDCCPCAREPLAN_GEN_ALL_CORE_FT
PRINCIPAL DISCHARGE DIAGNOSIS  Diagnosis: Pneumonia due to infectious organism, unspecified laterality, unspecified part of lung  Assessment and Plan of Treatment: finish course of abx  outpt pulm fu  outpt sleep studies

## 2019-11-11 DIAGNOSIS — Z71.89 OTHER SPECIFIED COUNSELING: ICD-10-CM

## 2019-12-04 LAB
CULTURE RESULTS: SIGNIFICANT CHANGE UP
SPECIMEN SOURCE: SIGNIFICANT CHANGE UP

## 2020-11-05 NOTE — CONSULT NOTE ADULT - CONSULT REQUESTED BY NAME
Impression: S/P 25G PPV/EL/Gas vs SO, poss SB OS OS - 35 Days. 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 Plan: Retina attached. No s/s of infection. IOP elevated but acceptable. RD/Endoph WS discussed. OCT shows good macular contour, no ERM or CME. 

RTC 4-6 weeks PO OS
indirect laser retinopexy OD x lattice degen OD MD

## 2020-11-29 ENCOUNTER — APPOINTMENT (OUTPATIENT)
Dept: DISASTER EMERGENCY | Facility: CLINIC | Age: 60
End: 2020-11-29

## 2020-11-30 LAB — SARS-COV-2 N GENE NPH QL NAA+PROBE: NOT DETECTED

## 2020-12-01 ENCOUNTER — TRANSCRIPTION ENCOUNTER (OUTPATIENT)
Age: 60
End: 2020-12-01

## 2020-12-02 ENCOUNTER — OUTPATIENT (OUTPATIENT)
Dept: OUTPATIENT SERVICES | Facility: HOSPITAL | Age: 60
LOS: 1 days | End: 2020-12-02
Payer: COMMERCIAL

## 2020-12-02 ENCOUNTER — RESULT REVIEW (OUTPATIENT)
Age: 60
End: 2020-12-02

## 2020-12-02 DIAGNOSIS — R19.8 OTHER SPECIFIED SYMPTOMS AND SIGNS INVOLVING THE DIGESTIVE SYSTEM AND ABDOMEN: ICD-10-CM

## 2020-12-02 PROCEDURE — 88305 TISSUE EXAM BY PATHOLOGIST: CPT | Mod: 26

## 2020-12-02 PROCEDURE — 87116 MYCOBACTERIA CULTURE: CPT

## 2020-12-02 PROCEDURE — 87102 FUNGUS ISOLATION CULTURE: CPT

## 2020-12-02 PROCEDURE — 71045 X-RAY EXAM CHEST 1 VIEW: CPT | Mod: 26

## 2020-12-02 PROCEDURE — 31623 DX BRONCHOSCOPE/BRUSH: CPT

## 2020-12-02 PROCEDURE — 87015 SPECIMEN INFECT AGNT CONCNTJ: CPT

## 2020-12-02 PROCEDURE — 87206 SMEAR FLUORESCENT/ACID STAI: CPT

## 2020-12-02 PROCEDURE — 88305 TISSUE EXAM BY PATHOLOGIST: CPT

## 2020-12-02 PROCEDURE — 87070 CULTURE OTHR SPECIMN AEROBIC: CPT

## 2020-12-02 PROCEDURE — 71045 X-RAY EXAM CHEST 1 VIEW: CPT

## 2020-12-02 PROCEDURE — 88108 CYTOPATH CONCENTRATE TECH: CPT

## 2020-12-02 PROCEDURE — 88108 CYTOPATH CONCENTRATE TECH: CPT | Mod: 26

## 2020-12-03 LAB
GRAM STN FLD: SIGNIFICANT CHANGE UP
NIGHT BLUE STAIN TISS: SIGNIFICANT CHANGE UP
NON-GYNECOLOGICAL CYTOLOGY STUDY: SIGNIFICANT CHANGE UP
NON-GYNECOLOGICAL CYTOLOGY STUDY: SIGNIFICANT CHANGE UP
SPECIMEN SOURCE: SIGNIFICANT CHANGE UP
SPECIMEN SOURCE: SIGNIFICANT CHANGE UP
SURGICAL PATHOLOGY STUDY: SIGNIFICANT CHANGE UP

## 2020-12-04 LAB
CULTURE RESULTS: SIGNIFICANT CHANGE UP
SPECIMEN SOURCE: SIGNIFICANT CHANGE UP

## 2020-12-23 ENCOUNTER — APPOINTMENT (OUTPATIENT)
Dept: THORACIC SURGERY | Facility: CLINIC | Age: 60
End: 2020-12-23
Payer: MEDICARE

## 2020-12-23 VITALS
RESPIRATION RATE: 14 BRPM | DIASTOLIC BLOOD PRESSURE: 66 MMHG | HEIGHT: 66 IN | SYSTOLIC BLOOD PRESSURE: 99 MMHG | WEIGHT: 180 LBS | OXYGEN SATURATION: 97 % | HEART RATE: 88 BPM | BODY MASS INDEX: 28.93 KG/M2

## 2020-12-23 DIAGNOSIS — Z87.09 PERSONAL HISTORY OF OTHER DISEASES OF THE RESPIRATORY SYSTEM: ICD-10-CM

## 2020-12-23 DIAGNOSIS — Z86.59 PERSONAL HISTORY OF OTHER MENTAL AND BEHAVIORAL DISORDERS: ICD-10-CM

## 2020-12-23 DIAGNOSIS — Z87.448 PERSONAL HISTORY OF OTHER DISEASES OF URINARY SYSTEM: ICD-10-CM

## 2020-12-23 DIAGNOSIS — Z87.891 PERSONAL HISTORY OF NICOTINE DEPENDENCE: ICD-10-CM

## 2020-12-23 DIAGNOSIS — Z87.01 PERSONAL HISTORY OF PNEUMONIA (RECURRENT): ICD-10-CM

## 2020-12-23 DIAGNOSIS — G43.909 MIGRAINE, UNSPECIFIED, NOT INTRACTABLE, W/OUT STATUS MIGRAINOSUS: ICD-10-CM

## 2020-12-23 DIAGNOSIS — R91.8 OTHER NONSPECIFIC ABNORMAL FINDING OF LUNG FIELD: ICD-10-CM

## 2020-12-23 PROCEDURE — 99072 ADDL SUPL MATRL&STAF TM PHE: CPT

## 2020-12-23 PROCEDURE — 99204 OFFICE O/P NEW MOD 45 MIN: CPT

## 2020-12-29 PROBLEM — R91.8 ENDOBRONCHIAL MASS: Status: ACTIVE | Noted: 2020-12-29

## 2020-12-30 LAB
CULTURE RESULTS: SIGNIFICANT CHANGE UP
SPECIMEN SOURCE: SIGNIFICANT CHANGE UP

## 2020-12-30 NOTE — DATA REVIEWED
[FreeTextEntry1] : CT chest on 10/26/20\par - 5 x 4 mm endobronchial lesion in the RUL bronchus\par - Unchanged, 3 mm nodule in RUL (3:42) \par - Stable 2 mm nodule in LLL (3:91)\par - Scarring in RML\par - Lt renal cysts\par \par Bronchoscopy on 12/2/20;RUL biopsy- no evidence of neoplasm. Fragments of vegetable matter consistent with food material- compatible with aspiration. Actinomyces colony noted, consistent with oral contaminant. RUL brushings and BAL negative for malignant cells, + for foreign debris and vegetable matter, compatible with aspiration PNA.

## 2020-12-30 NOTE — PHYSICAL EXAM
[General Appearance - Alert] : alert [General Appearance - In No Acute Distress] : in no acute distress [Sclera] : the sclera and conjunctiva were normal [Outer Ear] : the ears and nose were normal in appearance [Hearing Threshold Finger Rub Not Surry] : hearing was normal [Neck Appearance] : the appearance of the neck was normal [Neck Cervical Mass (___cm)] : no neck mass was observed [Jugular Venous Distention Increased] : there was no jugular-venous distention [Respiration, Rhythm And Depth] : normal respiratory rhythm and effort [Exaggerated Use Of Accessory Muscles For Inspiration] : no accessory muscle use [Auscultation Breath Sounds / Voice Sounds] : lungs were clear to auscultation bilaterally [Heart Rate And Rhythm] : heart rate was normal and rhythm regular [Heart Sounds] : normal S1 and S2 [Examination Of The Chest] : the chest was normal in appearance [Chest Visual Inspection Thoracic Asymmetry] : no chest asymmetry [Diminished Respiratory Excursion] : normal chest expansion [2+] : left 2+ [No Pulse Delay] : no pulse delay [Breast Appearance] : normal in appearance [Breast Palpation Mass] : no palpable masses [Bowel Sounds] : normal bowel sounds [Abdomen Soft] : soft [Cervical Lymph Nodes Enlarged Posterior Bilaterally] : posterior cervical [Cervical Lymph Nodes Enlarged Anterior Bilaterally] : anterior cervical [Supraclavicular Lymph Nodes Enlarged Bilaterally] : supraclavicular [No CVA Tenderness] : no ~M costovertebral angle tenderness [No Spinal Tenderness] : no spinal tenderness [Nail Clubbing] : no clubbing  or cyanosis of the fingernails [Musculoskeletal - Swelling] : no joint swelling seen [Motor Tone] : muscle strength and tone were normal [Skin Color & Pigmentation] : normal skin color and pigmentation [Skin Turgor] : normal skin turgor [Deep Tendon Reflexes (DTR)] : deep tendon reflexes were 2+ and symmetric [Sensation] : the sensory exam was normal to light touch and pinprick [No Focal Deficits] : no focal deficits [Impaired Insight] : insight and judgment were intact [Affect] : the affect was normal [Fingers] :  capillary refill of the fingers was normal [Toes] :  capillary refill of the toes was normal [General Appearance - Well Nourished] : well nourished [PERRL With Normal Accommodation] : pupils were equal in size, round, and reactive to light [] : the neck was supple [Edema] : there was no peripheral edema [FreeTextEntry1] : deferred

## 2020-12-30 NOTE — ASSESSMENT
[FreeTextEntry1] : Mr. JAME CHAIDEZ, 60 year old male, Former smoker (1 PPD x 5 years; Quit 1990), w/ hx of CKD, pleural effusion and Aspiration pneumonia (x 5 in 2019), who was hospitalized late October for  aspiration pneumonia. In hopital, evaluated by speech and swallow, who recommended change in diet consistency. He was discharged home on oral abx and instructed to follow up with pulm. Referred to by Dr. Guerra for possible resection.\par \par CT chest on 10/26/20\par - 5 x 4 mm endobronchial lesion in the RUL bronchus\par - Unchanged, 3 mm nodule in RUL (3:42) \par - Stable 2 mm nodule in LLL (3:91)\par - Scarring in RML\par - Lt renal cysts\par \par Bronchoscopy on 12/2/20;RUL biopsy- no evidence of neoplasm. Fragments of vegetable matter consistent with food material- compatible with aspiration. Actinomyces colony noted, consistent with oral contaminant. RUL brushings and BAL negative for malignant cells, + for foreign debris and vegetable matter, compatible with aspiration PNA.\par \par I have reviewed the medical records and imaging with the patient at the time of this office consultation, and I've made the following recommendations:\par 1) Endobronchial lesion in RUL bronchus. I've recommended a Bronchoscopy, possible lobectomy. Risks, benefits and alternatives explained to patient; All questions answered and patient agrees to proceed with surgery. \par 2) Cardiac clearance, PFTs and COVID-19 testing required prior to procedure. \par \par

## 2020-12-30 NOTE — CONSULT LETTER
[Dear  ___] : Dear  [unfilled], [Consult Letter:] : I had the pleasure of evaluating your patient, [unfilled]. [( Thank you for referring [unfilled] for consultation for _____ )] : Thank you for referring [unfilled] for consultation for [unfilled] [Please see my note below.] : Please see my note below. [Consult Closing:] : Thank you very much for allowing me to participate in the care of this patient.  If you have any questions, please do not hesitate to contact me. [Sincerely,] : Sincerely, [FreeTextEntry2] : Dr. BEE Guerra (Pulm/Ref) [FreeTextEntry3] : Roberto Daly MD\par Attending Surgeon\par Division of Thoracic Surgery\par , [facility] [employer] [facility]\par

## 2020-12-30 NOTE — HISTORY OF PRESENT ILLNESS
None
[FreeTextEntry1] : Mr. JAME CHAIDEZ, 60 year old male, Former smoker (1 PPD x 5 years; Quit 1990), w/ hx of CKD, pleural effusion and Aspiration pneumonia (x 5 in 2019), who was hospitalized late October for  aspiration pneumonia. In hopital, evaluated by speech and swallow, who recommended change in diet consistency. He was discharged home on oral abx and instructed to follow up with pulm. Referred to by Dr. Guerra for possible resection.\par \par CT chest on 10/26/20\par - 5 x 4 mm endobronchial lesion in the RUL bronchus\par - Unchanged, 3 mm nodule in RUL (3:42) \par - Stable 2 mm nodule in LLL (3:91)\par - Scarring in RML\par - Lt renal cysts\par \par Bronchoscopy on 12/2/20;RUL biopsy- no evidence of neoplasm. Fragments of vegetable matter consistent with food material- compatible with aspiration. Actinomyces colony noted, consistent with oral contaminant. RUL brushings and BAL negative for malignant cells, + for foreign debris and vegetable matter, compatible with aspiration PNA.\par \par Patient presents to office for surgical consultation. Patient denies SOB, chest pain, cough, hemoptysis, dyspnea upon exertion, fever, chills, night sweats, lightheadedness or dizziness.\par

## 2020-12-31 RX ORDER — PAROXETINE HYDROCHLORIDE 30 MG/1
30 TABLET, FILM COATED ORAL DAILY
Refills: 0 | Status: ACTIVE | COMMUNITY

## 2020-12-31 RX ORDER — CLONAZEPAM 2 MG/1
2 TABLET ORAL DAILY
Refills: 0 | Status: ACTIVE | COMMUNITY

## 2020-12-31 RX ORDER — ALBUTEROL SULFATE 90 UG/1
108 (90 BASE) POWDER, METERED RESPIRATORY (INHALATION)
Refills: 0 | Status: DISCONTINUED | COMMUNITY
End: 2020-12-31

## 2020-12-31 RX ORDER — MOMETASONE FUROATE MONOHYDRATE 50 UG/1
SPRAY, METERED NASAL
Refills: 0 | Status: DISCONTINUED | COMMUNITY
End: 2020-12-31

## 2020-12-31 RX ORDER — CLOZAPINE 100 MG/1
100 TABLET ORAL AT BEDTIME
Refills: 0 | Status: ACTIVE | COMMUNITY

## 2020-12-31 RX ORDER — ROSUVASTATIN CALCIUM 5 MG/1
5 TABLET, FILM COATED ORAL
Refills: 0 | Status: ACTIVE | COMMUNITY

## 2020-12-31 RX ORDER — LEVOFLOXACIN 500 MG/1
500 TABLET, FILM COATED ORAL
Refills: 0 | Status: DISCONTINUED | COMMUNITY
End: 2020-12-31

## 2020-12-31 RX ORDER — VERAPAMIL HYDROCHLORIDE 80 MG/1
TABLET ORAL
Refills: 0 | Status: DISCONTINUED | COMMUNITY
End: 2020-12-31

## 2020-12-31 RX ORDER — PANTOPRAZOLE SODIUM 40 MG/1
40 TABLET, DELAYED RELEASE ORAL
Refills: 0 | Status: DISCONTINUED | COMMUNITY
End: 2020-12-31

## 2021-01-04 ENCOUNTER — APPOINTMENT (OUTPATIENT)
Dept: DISASTER EMERGENCY | Facility: CLINIC | Age: 61
End: 2021-01-04

## 2021-01-04 ENCOUNTER — APPOINTMENT (OUTPATIENT)
Dept: CARDIOLOGY | Facility: CLINIC | Age: 61
End: 2021-01-04
Payer: MEDICARE

## 2021-01-04 ENCOUNTER — NON-APPOINTMENT (OUTPATIENT)
Age: 61
End: 2021-01-04

## 2021-01-04 VITALS
HEIGHT: 66 IN | DIASTOLIC BLOOD PRESSURE: 75 MMHG | SYSTOLIC BLOOD PRESSURE: 110 MMHG | HEART RATE: 99 BPM | TEMPERATURE: 97.7 F | OXYGEN SATURATION: 97 % | BODY MASS INDEX: 37.93 KG/M2

## 2021-01-04 VITALS — BODY MASS INDEX: 37.93 KG/M2 | WEIGHT: 235 LBS

## 2021-01-04 DIAGNOSIS — Z01.818 ENCOUNTER FOR OTHER PREPROCEDURAL EXAMINATION: ICD-10-CM

## 2021-01-04 PROCEDURE — 99203 OFFICE O/P NEW LOW 30 MIN: CPT

## 2021-01-04 PROCEDURE — 99072 ADDL SUPL MATRL&STAF TM PHE: CPT

## 2021-01-04 PROCEDURE — 93000 ELECTROCARDIOGRAM COMPLETE: CPT | Mod: NC

## 2021-01-04 NOTE — HISTORY OF PRESENT ILLNESS
[FreeTextEntry1] : 61 yo man presents to cardiology for pre-operative evaluation. \par \par Former smoker (1 PPD x 5 years; Quit 1990), CKD, pleural effusion and numerous (aspiration) pneumonias, last in October.\par \par CT chest on 10/26/20\par - 5 x 4 mm endobronchial lesion in the RUL bronchus\par - Unchanged, 3 mm nodule in RUL (3:42) \par - Stable 2 mm nodule in LLL (3:91)\par - Scarring in RML\par - Lt renal cysts\par \par Bronchoscopy on 12/2/20;RUL biopsy- no evidence of neoplasm. Fragments of vegetable matter consistent with food material- compatible with aspiration. Actinomyces colony noted, consistent with oral contaminant. RUL brushings and BAL negative for malignant cells, + for foreign debris and vegetable matter, compatible with aspiration PNA.\par \par Seen by Dr. Roberto Daly with plans for bronchoscopy, possible lobectomy. \par \par No h/o MI, heart failure, arrhythmias. No HTN. He has been taking statins for a while for HLD. No DM. No known CIRILO. FH of CAD, brother with stents at 60 yo; still alive and well. \par \par No chest discomfort at rest or with exertion. He is able to ambulate without difficulty. He is able to climb two flights of stairs without stopping or difficulty. No SOB at rest, PND, orthopnea, edema. He has some chronic LOWE, thought secondary to his chronic pneumonias and deconditioning. No palpitations. No syncope. \par \par ECG today:\par Sinus rhythm \par Low voltage in limb leads\par Nonspecific T wave abnormalities\par

## 2021-01-04 NOTE — PHYSICAL EXAM
[General Appearance - Well Developed] : well developed [Normal Appearance] : normal appearance [General Appearance - Well Nourished] : well nourished [No Deformities] : no deformities [FreeTextEntry1] : carotids 2+, no bruits; no JVD [Heart Rate And Rhythm] : heart rate and rhythm were normal [Heart Sounds] : normal S1 and S2 [Murmurs] : no murmurs present [Arterial Pulses Normal] : the arterial pulses were normal [Edema] : no peripheral edema present [Abdomen Soft] : soft [Abdomen Mass (___ Cm)] : no abdominal mass palpated [Abnormal Walk] : normal gait [Skin Color & Pigmentation] : normal skin color and pigmentation [] : no rash

## 2021-01-05 ENCOUNTER — OUTPATIENT (OUTPATIENT)
Dept: OUTPATIENT SERVICES | Facility: HOSPITAL | Age: 61
LOS: 1 days | End: 2021-01-05

## 2021-01-05 VITALS
SYSTOLIC BLOOD PRESSURE: 118 MMHG | OXYGEN SATURATION: 98 % | HEART RATE: 98 BPM | HEIGHT: 70 IN | RESPIRATION RATE: 16 BRPM | TEMPERATURE: 98 F | DIASTOLIC BLOOD PRESSURE: 80 MMHG | WEIGHT: 235.89 LBS

## 2021-01-05 DIAGNOSIS — F20.9 SCHIZOPHRENIA, UNSPECIFIED: ICD-10-CM

## 2021-01-05 DIAGNOSIS — R91.8 OTHER NONSPECIFIC ABNORMAL FINDING OF LUNG FIELD: ICD-10-CM

## 2021-01-05 DIAGNOSIS — Z01.812 ENCOUNTER FOR PREPROCEDURAL LABORATORY EXAMINATION: ICD-10-CM

## 2021-01-05 LAB
ANION GAP SERPL CALC-SCNC: 11 MMOL/L — SIGNIFICANT CHANGE UP (ref 7–14)
BLD GP AB SCN SERPL QL: NEGATIVE — SIGNIFICANT CHANGE UP
BUN SERPL-MCNC: 15 MG/DL — SIGNIFICANT CHANGE UP (ref 7–23)
CALCIUM SERPL-MCNC: 9.2 MG/DL — SIGNIFICANT CHANGE UP (ref 8.4–10.5)
CHLORIDE SERPL-SCNC: 106 MMOL/L — SIGNIFICANT CHANGE UP (ref 98–107)
CO2 SERPL-SCNC: 26 MMOL/L — SIGNIFICANT CHANGE UP (ref 22–31)
CREAT SERPL-MCNC: 1.45 MG/DL — HIGH (ref 0.5–1.3)
GLUCOSE SERPL-MCNC: 63 MG/DL — LOW (ref 70–99)
HCT VFR BLD CALC: 42.3 % — SIGNIFICANT CHANGE UP (ref 39–50)
HGB BLD-MCNC: 13.8 G/DL — SIGNIFICANT CHANGE UP (ref 13–17)
MCHC RBC-ENTMCNC: 30.6 PG — SIGNIFICANT CHANGE UP (ref 27–34)
MCHC RBC-ENTMCNC: 32.6 GM/DL — SIGNIFICANT CHANGE UP (ref 32–36)
MCV RBC AUTO: 93.8 FL — SIGNIFICANT CHANGE UP (ref 80–100)
NRBC # BLD: 0 /100 WBCS — SIGNIFICANT CHANGE UP
NRBC # FLD: 0 K/UL — SIGNIFICANT CHANGE UP
PLATELET # BLD AUTO: 165 K/UL — SIGNIFICANT CHANGE UP (ref 150–400)
POTASSIUM SERPL-MCNC: 4 MMOL/L — SIGNIFICANT CHANGE UP (ref 3.5–5.3)
POTASSIUM SERPL-SCNC: 4 MMOL/L — SIGNIFICANT CHANGE UP (ref 3.5–5.3)
RBC # BLD: 4.51 M/UL — SIGNIFICANT CHANGE UP (ref 4.2–5.8)
RBC # FLD: 13.8 % — SIGNIFICANT CHANGE UP (ref 10.3–14.5)
RH IG SCN BLD-IMP: POSITIVE — SIGNIFICANT CHANGE UP
SARS-COV-2 N GENE NPH QL NAA+PROBE: NOT DETECTED
SODIUM SERPL-SCNC: 143 MMOL/L — SIGNIFICANT CHANGE UP (ref 135–145)
WBC # BLD: 4.38 K/UL — SIGNIFICANT CHANGE UP (ref 3.8–10.5)
WBC # FLD AUTO: 4.38 K/UL — SIGNIFICANT CHANGE UP (ref 3.8–10.5)

## 2021-01-05 RX ORDER — IPRATROPIUM BROMIDE 21 MCG
2 AEROSOL, SPRAY (ML) NASAL
Qty: 0 | Refills: 0 | DISCHARGE

## 2021-01-05 RX ORDER — ALBUTEROL 90 UG/1
2 AEROSOL, METERED ORAL
Qty: 0 | Refills: 0 | DISCHARGE

## 2021-01-05 RX ORDER — MOMETASONE FUROATE 50 UG/1
2 SPRAY NASAL
Qty: 0 | Refills: 0 | DISCHARGE

## 2021-01-05 RX ORDER — CLONAZEPAM 1 MG
1 TABLET ORAL
Qty: 0 | Refills: 0 | DISCHARGE

## 2021-01-05 RX ORDER — VERAPAMIL HCL 240 MG
1 CAPSULE, EXTENDED RELEASE PELLETS 24 HR ORAL
Qty: 0 | Refills: 0 | DISCHARGE

## 2021-01-05 NOTE — H&P PST ADULT - NEGATIVE OPHTHALMOLOGIC SYMPTOMS
glasses/no diplopia/no photophobia/no lacrimation L/no lacrimation R/no blurred vision L/no blurred vision R

## 2021-01-05 NOTE — H&P PST ADULT - ATTENDING COMMENTS
Patient seen and examined agree with above note as modified, where appropriate, by me. The risks benefits and alternatives of the procedure were explained to the patient including but not limited to bleeding, infection, prolonged air leak, shortness of breath, chronic pain, lymphatic leak and nerve injury. All of the patients questions were answered, he demonstrated understanding and freely consented to the procedure.

## 2021-01-05 NOTE — H&P PST ADULT - NEGATIVE ENMT SYMPTOMS
no hearing difficulty/no ear pain/no tinnitus/no vertigo/no sinus symptoms/no throat pain/no dysphagia

## 2021-01-05 NOTE — H&P PST ADULT - NSICDXPASTMEDICALHX_GEN_ALL_CORE_FT
PAST MEDICAL HISTORY:  Drug abuse, in remission sober x 20 yrs    H/O ETOH abuse sober x 20 yrs    H/O pleural effusion DX IN 2011 when pt had pneumonia  chronic condition now    HLD (hyperlipidemia)     Migraines     Obesity     Pneumonia 2011    Schizophrenia     Smoking hx

## 2021-01-05 NOTE — H&P PST ADULT - NSICDXPROBLEM_GEN_ALL_CORE_FT
PROBLEM DIAGNOSES  Problem: Other nonspecific abnormal finding of lung field  Assessment and Plan: Scheduled for bronchoscopy, possible right upper lobectomy on 1/7/2021  Written & verbal preop instructions, gi prophylaxis & surgical soap given  Pt verbalized good understanding.  Teach back done on surgical soap instructions.  abo on admit   copy of cardiology eval in chart.  Ranjeet precautions recommended.  OR booking notified via fax.      Problem: Encounter for preprocedure screening laboratory testing for COVID-19  Assessment and Plan: Per pt done within 72hrs of this surgery.    Problem: Schizophrenia  Assessment and Plan: Pt instructed to continue medication per routine schedule.

## 2021-01-05 NOTE — H&P PST ADULT - NEGATIVE GENERAL GENITOURINARY SYMPTOMS
no hematuria/no renal colic/no flank pain L/no flank pain R/no bladder infections/no dysuria/normal urinary frequency

## 2021-01-05 NOTE — H&P PST ADULT - RS GEN PE MLT RESP DETAILS PC
Preop dx other nonspecific abnormal finding of lung field/breath sounds equal/respirations non-labored/clear to auscultation bilaterally

## 2021-01-05 NOTE — H&P PST ADULT - HISTORY OF PRESENT ILLNESS
59y/o male present for preop eval for scheduled bronchoscopy possible right upper lobectomy.  Pt with c/o h/o recent pneumonia.  Imaging study done Dx with nodule in right upper lung.

## 2021-01-06 NOTE — ASU PATIENT PROFILE, ADULT - PMH
Drug abuse, in remission  sober x 20 yrs  H/O ETOH abuse  sober x 20 yrs  H/O pleural effusion  DX IN 2011 when pt had pneumonia  chronic condition now  HLD (hyperlipidemia)    Migraines    Obesity    Pneumonia  2011  Schizophrenia    Smoking hx

## 2021-01-07 ENCOUNTER — APPOINTMENT (OUTPATIENT)
Dept: THORACIC SURGERY | Facility: HOSPITAL | Age: 61
End: 2021-01-07

## 2021-01-07 ENCOUNTER — INPATIENT (INPATIENT)
Facility: HOSPITAL | Age: 61
LOS: 11 days | Discharge: HOME | End: 2021-01-19
Attending: THORACIC SURGERY (CARDIOTHORACIC VASCULAR SURGERY) | Admitting: THORACIC SURGERY (CARDIOTHORACIC VASCULAR SURGERY)
Payer: MEDICARE

## 2021-01-07 ENCOUNTER — RESULT REVIEW (OUTPATIENT)
Age: 61
End: 2021-01-07

## 2021-01-07 VITALS
HEART RATE: 83 BPM | TEMPERATURE: 98 F | SYSTOLIC BLOOD PRESSURE: 114 MMHG | OXYGEN SATURATION: 98 % | DIASTOLIC BLOOD PRESSURE: 78 MMHG | RESPIRATION RATE: 18 BRPM | WEIGHT: 235.89 LBS | HEIGHT: 70 IN

## 2021-01-07 DIAGNOSIS — R91.8 OTHER NONSPECIFIC ABNORMAL FINDING OF LUNG FIELD: ICD-10-CM

## 2021-01-07 LAB
ALBUMIN SERPL ELPH-MCNC: 3.9 G/DL — SIGNIFICANT CHANGE UP (ref 3.3–5)
ALP SERPL-CCNC: 70 U/L — SIGNIFICANT CHANGE UP (ref 40–120)
ALT FLD-CCNC: 19 U/L — SIGNIFICANT CHANGE UP (ref 4–41)
ANION GAP SERPL CALC-SCNC: 9 MMOL/L — SIGNIFICANT CHANGE UP (ref 7–14)
APTT BLD: 26.8 SEC — LOW (ref 27–36.3)
AST SERPL-CCNC: 26 U/L — SIGNIFICANT CHANGE UP (ref 4–40)
BILIRUB SERPL-MCNC: 0.7 MG/DL — SIGNIFICANT CHANGE UP (ref 0.2–1.2)
BLOOD GAS ARTERIAL COMPREHENSIVE WITH CREATININE RESULT: SIGNIFICANT CHANGE UP
BUN SERPL-MCNC: 19 MG/DL — SIGNIFICANT CHANGE UP (ref 7–23)
CALCIUM SERPL-MCNC: 8.4 MG/DL — SIGNIFICANT CHANGE UP (ref 8.4–10.5)
CHLORIDE SERPL-SCNC: 108 MMOL/L — HIGH (ref 98–107)
CO2 SERPL-SCNC: 25 MMOL/L — SIGNIFICANT CHANGE UP (ref 22–31)
CREAT SERPL-MCNC: 1.28 MG/DL — SIGNIFICANT CHANGE UP (ref 0.5–1.3)
GAS PNL BLDA: SIGNIFICANT CHANGE UP
GAS PNL BLDA: SIGNIFICANT CHANGE UP
GLUCOSE SERPL-MCNC: 141 MG/DL — HIGH (ref 70–99)
HCT VFR BLD CALC: 36.5 % — LOW (ref 39–50)
HGB BLD-MCNC: 11.9 G/DL — LOW (ref 13–17)
INR BLD: 1.05 RATIO — SIGNIFICANT CHANGE UP (ref 0.88–1.16)
MCHC RBC-ENTMCNC: 30.6 PG — SIGNIFICANT CHANGE UP (ref 27–34)
MCHC RBC-ENTMCNC: 32.6 GM/DL — SIGNIFICANT CHANGE UP (ref 32–36)
MCV RBC AUTO: 93.8 FL — SIGNIFICANT CHANGE UP (ref 80–100)
NIGHT BLUE STAIN TISS: SIGNIFICANT CHANGE UP
NRBC # BLD: 0 /100 WBCS — SIGNIFICANT CHANGE UP
NRBC # FLD: 0 K/UL — SIGNIFICANT CHANGE UP
PLATELET # BLD AUTO: 126 K/UL — LOW (ref 150–400)
POTASSIUM SERPL-MCNC: 4.5 MMOL/L — SIGNIFICANT CHANGE UP (ref 3.5–5.3)
POTASSIUM SERPL-SCNC: 4.5 MMOL/L — SIGNIFICANT CHANGE UP (ref 3.5–5.3)
PROT SERPL-MCNC: 6.3 G/DL — SIGNIFICANT CHANGE UP (ref 6–8.3)
PROTHROM AB SERPL-ACNC: 11.9 SEC — SIGNIFICANT CHANGE UP (ref 10.6–13.6)
RBC # BLD: 3.89 M/UL — LOW (ref 4.2–5.8)
RBC # FLD: 13.9 % — SIGNIFICANT CHANGE UP (ref 10.3–14.5)
SODIUM SERPL-SCNC: 142 MMOL/L — SIGNIFICANT CHANGE UP (ref 135–145)
SPECIMEN SOURCE: SIGNIFICANT CHANGE UP
WBC # BLD: 9.05 K/UL — SIGNIFICANT CHANGE UP (ref 3.8–10.5)
WBC # FLD AUTO: 9.05 K/UL — SIGNIFICANT CHANGE UP (ref 3.8–10.5)

## 2021-01-07 PROCEDURE — 99291 CRITICAL CARE FIRST HOUR: CPT

## 2021-01-07 PROCEDURE — 32124 EXPLORE CHEST FREE ADHESIONS: CPT | Mod: 80,59,RT

## 2021-01-07 PROCEDURE — 32601 THORACOSCOPY DIAGNOSTIC: CPT | Mod: 80,53,59,RT

## 2021-01-07 PROCEDURE — 88309 TISSUE EXAM BY PATHOLOGIST: CPT | Mod: 26

## 2021-01-07 PROCEDURE — 32480 PARTIAL REMOVAL OF LUNG: CPT | Mod: 80,RT

## 2021-01-07 PROCEDURE — 32124 EXPLORE CHEST FREE ADHESIONS: CPT | Mod: RT,59

## 2021-01-07 PROCEDURE — 32601 THORACOSCOPY DIAGNOSTIC: CPT | Mod: 59,RT

## 2021-01-07 PROCEDURE — 88305 TISSUE EXAM BY PATHOLOGIST: CPT | Mod: 26

## 2021-01-07 PROCEDURE — 71045 X-RAY EXAM CHEST 1 VIEW: CPT | Mod: 26

## 2021-01-07 PROCEDURE — 71045 X-RAY EXAM CHEST 1 VIEW: CPT | Mod: 26,77

## 2021-01-07 PROCEDURE — 32480 PARTIAL REMOVAL OF LUNG: CPT | Mod: RT

## 2021-01-07 RX ORDER — DEXMEDETOMIDINE HYDROCHLORIDE IN 0.9% SODIUM CHLORIDE 4 UG/ML
0.5 INJECTION INTRAVENOUS
Qty: 400 | Refills: 0 | Status: DISCONTINUED | OUTPATIENT
Start: 2021-01-07 | End: 2021-01-09

## 2021-01-07 RX ORDER — CHLORHEXIDINE GLUCONATE 213 G/1000ML
15 SOLUTION TOPICAL EVERY 12 HOURS
Refills: 0 | Status: DISCONTINUED | OUTPATIENT
Start: 2021-01-07 | End: 2021-01-09

## 2021-01-07 RX ORDER — GABAPENTIN 400 MG/1
300 CAPSULE ORAL ONCE
Refills: 0 | Status: COMPLETED | OUTPATIENT
Start: 2021-01-07 | End: 2021-01-07

## 2021-01-07 RX ORDER — FAMOTIDINE 10 MG/ML
20 INJECTION INTRAVENOUS EVERY 12 HOURS
Refills: 0 | Status: DISCONTINUED | OUTPATIENT
Start: 2021-01-07 | End: 2021-01-19

## 2021-01-07 RX ORDER — SENNA PLUS 8.6 MG/1
2 TABLET ORAL AT BEDTIME
Refills: 0 | Status: DISCONTINUED | OUTPATIENT
Start: 2021-01-07 | End: 2021-01-19

## 2021-01-07 RX ORDER — HEPARIN SODIUM 5000 [USP'U]/ML
7500 INJECTION INTRAVENOUS; SUBCUTANEOUS ONCE
Refills: 0 | Status: COMPLETED | OUTPATIENT
Start: 2021-01-07 | End: 2021-01-07

## 2021-01-07 RX ORDER — ATORVASTATIN CALCIUM 80 MG/1
20 TABLET, FILM COATED ORAL AT BEDTIME
Refills: 0 | Status: DISCONTINUED | OUTPATIENT
Start: 2021-01-07 | End: 2021-01-19

## 2021-01-07 RX ORDER — CLONAZEPAM 1 MG
2 TABLET ORAL AT BEDTIME
Refills: 0 | Status: DISCONTINUED | OUTPATIENT
Start: 2021-01-07 | End: 2021-01-14

## 2021-01-07 RX ORDER — HEPARIN SODIUM 5000 [USP'U]/ML
500 INJECTION INTRAVENOUS; SUBCUTANEOUS EVERY 8 HOURS
Refills: 0 | Status: DISCONTINUED | OUTPATIENT
Start: 2021-01-07 | End: 2021-01-08

## 2021-01-07 RX ORDER — NALOXONE HYDROCHLORIDE 4 MG/.1ML
0.1 SPRAY NASAL
Refills: 0 | Status: DISCONTINUED | OUTPATIENT
Start: 2021-01-07 | End: 2021-01-10

## 2021-01-07 RX ORDER — SODIUM CHLORIDE 9 MG/ML
1000 INJECTION, SOLUTION INTRAVENOUS
Refills: 0 | Status: DISCONTINUED | OUTPATIENT
Start: 2021-01-07 | End: 2021-01-12

## 2021-01-07 RX ORDER — ONDANSETRON 8 MG/1
4 TABLET, FILM COATED ORAL EVERY 6 HOURS
Refills: 0 | Status: DISCONTINUED | OUTPATIENT
Start: 2021-01-07 | End: 2021-01-10

## 2021-01-07 RX ORDER — ACETAMINOPHEN 500 MG
975 TABLET ORAL ONCE
Refills: 0 | Status: COMPLETED | OUTPATIENT
Start: 2021-01-07 | End: 2021-01-07

## 2021-01-07 RX ORDER — PROPOFOL 10 MG/ML
50 INJECTION, EMULSION INTRAVENOUS
Qty: 1000 | Refills: 0 | Status: DISCONTINUED | OUTPATIENT
Start: 2021-01-07 | End: 2021-01-08

## 2021-01-07 RX ORDER — CHLORHEXIDINE GLUCONATE 213 G/1000ML
15 SOLUTION TOPICAL EVERY 12 HOURS
Refills: 0 | Status: DISCONTINUED | OUTPATIENT
Start: 2021-01-07 | End: 2021-01-07

## 2021-01-07 RX ORDER — CLOZAPINE 150 MG/1
200 TABLET, ORALLY DISINTEGRATING ORAL AT BEDTIME
Refills: 0 | Status: DISCONTINUED | OUTPATIENT
Start: 2021-01-07 | End: 2021-01-19

## 2021-01-07 RX ADMIN — DEXMEDETOMIDINE HYDROCHLORIDE IN 0.9% SODIUM CHLORIDE 13.4 MICROGRAM(S)/KG/HR: 4 INJECTION INTRAVENOUS at 17:27

## 2021-01-07 RX ADMIN — PROPOFOL 32.1 MICROGRAM(S)/KG/MIN: 10 INJECTION, EMULSION INTRAVENOUS at 19:29

## 2021-01-07 RX ADMIN — HEPARIN SODIUM 500 UNIT(S): 5000 INJECTION INTRAVENOUS; SUBCUTANEOUS at 21:54

## 2021-01-07 RX ADMIN — SODIUM CHLORIDE 50 MILLILITER(S): 9 INJECTION, SOLUTION INTRAVENOUS at 22:32

## 2021-01-07 RX ADMIN — PROPOFOL 32.1 MICROGRAM(S)/KG/MIN: 10 INJECTION, EMULSION INTRAVENOUS at 17:27

## 2021-01-07 RX ADMIN — DEXMEDETOMIDINE HYDROCHLORIDE IN 0.9% SODIUM CHLORIDE 13.4 MICROGRAM(S)/KG/HR: 4 INJECTION INTRAVENOUS at 19:29

## 2021-01-07 RX ADMIN — Medication 975 MILLIGRAM(S): at 07:56

## 2021-01-07 RX ADMIN — CHLORHEXIDINE GLUCONATE 15 MILLILITER(S): 213 SOLUTION TOPICAL at 19:30

## 2021-01-07 RX ADMIN — GABAPENTIN 300 MILLIGRAM(S): 400 CAPSULE ORAL at 07:50

## 2021-01-07 RX ADMIN — HEPARIN SODIUM 7500 UNIT(S): 5000 INJECTION INTRAVENOUS; SUBCUTANEOUS at 07:56

## 2021-01-07 NOTE — PROGRESS NOTE ADULT - SUBJECTIVE AND OBJECTIVE BOX
JAME CHAIDEZ                     [id number]    HPI:  61y/o male present for preop eval for scheduled bronchoscopy possible right upper lobectomy.  Pt with c/o h/o recent pneumonia.  Imaging study done Dx with nodule in right upper lung.   (05 Jan 2021 11:31)      Procedure:   s/p R thoracotomy RUL lobectomy      Issues:  Lung nodule  Post op pain   Chest tube in place  h/o ETOH        PAST MEDICAL & SURGICAL HISTORY:  H/O pleural effusion  DX IN 2011 when pt had pneumonia  chronic condition now    Obesity    Migraines    Pneumonia  2011    Smoking hx    H/O ETOH abuse  sober x 20 yrs    Drug abuse, in remission  sober x 20 yrs    HLD (hyperlipidemia)    Schizophrenia    Skin lesion  face   &quot;pre-cancerous&quot;    Cyst  scalp excised 20 yrs ago              VITAL SIGNS:  Vital Signs Last 24 Hrs  T(C): 36.8 (07 Jan 2021 07:28), Max: 36.8 (07 Jan 2021 07:28)  T(F): 98.2 (07 Jan 2021 07:28), Max: 98.2 (07 Jan 2021 07:28)  HR: 83 (07 Jan 2021 07:28) (83 - 83)  BP: 114/78 (07 Jan 2021 07:28) (114/78 - 114/78)  BP(mean): --  RR: 18 (07 Jan 2021 07:28) (18 - 18)  SpO2: 98% (07 Jan 2021 07:28) (98% - 98%)    I/Os:   I&O's Detail      CAPILLARY BLOOD GLUCOSE          =======================MEDICATIONS===================  MEDICATIONS  (STANDING):  atorvastatin 20 milliGRAM(s) Oral at bedtime  clonazePAM  Tablet 2 milliGRAM(s) Oral at bedtime  cloZAPine 200 milliGRAM(s) Oral at bedtime  hydromorphone (10 MICROgram(s)/mL) + bupivacaine 0.0625% in 0.9% Sodium Chloride PCEA 250 milliLiter(s) Epidural PCA Continuous  PARoxetine 30 milliGRAM(s) Oral daily    MEDICATIONS  (PRN):  hydromorphone (10 MICROgram(s)/mL) + bupivacaine 0.0625% in 0.9% Sodium Chloride PCEA Rescue Clinician  Bolus 5 milliLiter(s) Epidural every 15 minutes PRN for Pain Score greater than 6  naloxone Injectable 0.1 milliGRAM(s) IV Push every 3 minutes PRN For ANY of the following changes in patient status:  A. RR LESS THAN 10 breaths per minute, B. Oxygen saturation LESS THAN 90%, C. Sedation score of 6  ondansetron Injectable 4 milliGRAM(s) IV Push every 6 hours PRN Nausea      PHYSICAL EXAM============================  General:                         Awake, alert, not in any distress  Neuro:                            Moving all extremities to commands.   Respiratory:	Air entry fair and  bilateral conducted sounds                                           Effort even and unlabored.  CV:		Regular rate and rhythm. Normal S1/S2                                          Distal pulses present.  Abdomen:	                     Soft, non-distended. Bowel sounds present   Skin:		No rash.  Extremities:	Warm, no cyanosis or edema.  Palpable pulses    ============================LABS=========================              ABG - ( 07 Jan 2021 10:01 )  pH, Arterial: 7.32  pH, Blood: x     /  pCO2: 50    /  pO2: 123   / HCO3: 24    / Base Excess: -0.4  /  SaO2: 98.6            =============================NEUROLOGY============================  Pain control with PCA /  Tylenol IV     ==============================RESPIRATORY========================  Pt is on  3L nasal canula   Comfortable, not in any distress.  Using incentive spirometry   Monitor chest tube output	  Continue bronchodilators, pulmonary toilet    ============================CARDIOVASCULAR======================  Continue hemodynamic monitoring.  Not on any pressors    =====================RENAL===================  Continue LR   Monitor I/Os and electrolytes    ====================GASTROINTESTINAL===================  On clears, tolerating  Continue GI prophylaxis with Protonix  Continue Zofran / Reglan for nausea - PRN	    ========================HEMATOLOGIC/ONCOLOGIC====================  Monitor chest tube output. No signs of active bleeding.   Follow CBC in AM    ============================INFECTIOUS DISEASE========================  Monitor for fever / leukocytosis.  All surgical incision / chest tube  sites look clean      Pt is on GI & DVT prophylaxis  OOB & ambulate       Pertinent clinical, laboratory, radiographic, hemodynamic, echocardiographic, respiratory data, microbiologic data and chart were reviewed and analyzed frequently throughout the course of the day and night  Patient seen, examined and plan discussed with CT Surgery / CTICU team during rounds.    Pt's status discussed with family at bedside, updated status        Florina KOTHARIP     JAME CHAIDEZ                     [id number]    HPI:  61y/o male present for preop eval for scheduled bronchoscopy possible right upper lobectomy.  Pt with c/o h/o recent pneumonia.  Imaging study done Dx with nodule in right upper lung.   (05 Jan 2021 11:31)      Procedure:   s/p R thoracotomy RUL lobectomy      Issues:  Acute respiratory failure, required re-intubation.   On Vent support   Lung nodule  Post op pain   Chest tube in place  h/o ETOH        PAST MEDICAL & SURGICAL HISTORY:  H/O pleural effusion  DX IN 2011 when pt had pneumonia  chronic condition now    Obesity    Migraines    Pneumonia  2011    Smoking hx    H/O ETOH abuse  sober x 20 yrs    Drug abuse, in remission  sober x 20 yrs    HLD (hyperlipidemia)    Schizophrenia    Skin lesion  face   &quot;pre-cancerous&quot;    Cyst  scalp excised 20 yrs ago              VITAL SIGNS:  Vital Signs Last 24 Hrs  T(C): 36.8 (07 Jan 2021 07:28), Max: 36.8 (07 Jan 2021 07:28)  T(F): 98.2 (07 Jan 2021 07:28), Max: 98.2 (07 Jan 2021 07:28)  HR: 83 (07 Jan 2021 07:28) (83 - 83)  BP: 114/78 (07 Jan 2021 07:28) (114/78 - 114/78)  BP(mean): --  RR: 18 (07 Jan 2021 07:28) (18 - 18)  SpO2: 98% (07 Jan 2021 07:28) (98% - 98%)    I/Os:   I&O's Detail      CAPILLARY BLOOD GLUCOSE          =======================MEDICATIONS===================  MEDICATIONS  (STANDING):  atorvastatin 20 milliGRAM(s) Oral at bedtime  clonazePAM  Tablet 2 milliGRAM(s) Oral at bedtime  cloZAPine 200 milliGRAM(s) Oral at bedtime  hydromorphone (10 MICROgram(s)/mL) + bupivacaine 0.0625% in 0.9% Sodium Chloride PCEA 250 milliLiter(s) Epidural PCA Continuous  PARoxetine 30 milliGRAM(s) Oral daily    MEDICATIONS  (PRN):  hydromorphone (10 MICROgram(s)/mL) + bupivacaine 0.0625% in 0.9% Sodium Chloride PCEA Rescue Clinician  Bolus 5 milliLiter(s) Epidural every 15 minutes PRN for Pain Score greater than 6  naloxone Injectable 0.1 milliGRAM(s) IV Push every 3 minutes PRN For ANY of the following changes in patient status:  A. RR LESS THAN 10 breaths per minute, B. Oxygen saturation LESS THAN 90%, C. Sedation score of 6  ondansetron Injectable 4 milliGRAM(s) IV Push every 6 hours PRN Nausea      PHYSICAL EXAM============================  General:                  Vented, sedated  Neuro:            sedated  Respiratory:	Air entry fair and  bilateral conducted sounds                                           Effort even and unlabored.  CV:		Regular rate and rhythm. Normal S1/S2                                          Distal pulses present.  Abdomen:	                     Soft, non-distended. Bowel sounds present   Skin:		No rash.  Extremities:	Warm, no cyanosis or edema.  Palpable pulses    ============================LABS=========================              ABG - ( 07 Jan 2021 10:01 )  pH, Arterial: 7.32  pH, Blood: x     /  pCO2: 50    /  pO2: 123   / HCO3: 24    / Base Excess: -0.4  /  SaO2: 98.6            =============================NEUROLOGY============================  Vented, sedated  nonfocal    ==============================RESPIRATORY========================  Acute respiratory failure, required re-intubation.   On Vent support   Comfortable, not in any distress.  Using incentive spirometry   Chest tube to suction , + air leak  Monitor chest tube output	  Continue bronchodilators, pulmonary toilet    ============================CARDIOVASCULAR======================  Continue hemodynamic monitoring.  HLD: Restart when taking PO    =====================RENAL===================  Continue LR   Monitor I/Os and electrolytes    ====================GASTROINTESTINAL===================  NPO  Continue GI prophylaxis with Protonix  Continue Zofran / Reglan for nausea - PRN	    ========================HEMATOLOGIC/ONCOLOGIC====================  Monitor chest tube output. No signs of active bleeding.   Follow CBC in AM    ============================INFECTIOUS DISEASE========================  Monitor for fever / leukocytosis.  All surgical incision / chest tube  sites look clean      Pt is on GI & DVT prophylaxis  OOB & ambulate       Pertinent clinical, laboratory, radiographic, hemodynamic, echocardiographic, respiratory data, microbiologic data and chart were reviewed and analyzed frequently throughout the course of the day and night  Patient seen, examined and plan discussed with CT Surgery / CTICU team during rounds.    Pt's status discussed with family at bedside, updated status  I spent 35 minutes at bedside providing critical care post op      Florina KOTHARIP

## 2021-01-07 NOTE — BRIEF OPERATIVE NOTE - NSICDXBRIEFPROCEDURE_GEN_ALL_CORE_FT
PROCEDURES:  Bronchoscopy, with lobectomy by thoracotomy using VATS 07-Jan-2021 13:43:35  Lamine Sutherland

## 2021-01-07 NOTE — BRIEF OPERATIVE NOTE - OPERATION/FINDINGS
60M former smoker h/o chronic aspiration pneumonia who underwent bronchoscopy which revealed severe narrowing of RUL orifice who is now s/p R thoracotomy RUL lobectomy

## 2021-01-08 LAB
ANION GAP SERPL CALC-SCNC: 11 MMOL/L — SIGNIFICANT CHANGE UP (ref 7–14)
APTT BLD: 26.7 SEC — LOW (ref 27–36.3)
BASOPHILS # BLD AUTO: 0.01 K/UL — SIGNIFICANT CHANGE UP (ref 0–0.2)
BASOPHILS NFR BLD AUTO: 0.1 % — SIGNIFICANT CHANGE UP (ref 0–2)
BLOOD GAS ARTERIAL COMPREHENSIVE WITH CREATININE RESULT: SIGNIFICANT CHANGE UP
BLOOD GAS ARTERIAL COMPREHENSIVE WITH CREATININE RESULT: SIGNIFICANT CHANGE UP
BUN SERPL-MCNC: 18 MG/DL — SIGNIFICANT CHANGE UP (ref 7–23)
CALCIUM SERPL-MCNC: 8.2 MG/DL — LOW (ref 8.4–10.5)
CHLORIDE SERPL-SCNC: 110 MMOL/L — HIGH (ref 98–107)
CO2 SERPL-SCNC: 21 MMOL/L — LOW (ref 22–31)
CREAT SERPL-MCNC: 1.12 MG/DL — SIGNIFICANT CHANGE UP (ref 0.5–1.3)
EOSINOPHIL # BLD AUTO: 0.01 K/UL — SIGNIFICANT CHANGE UP (ref 0–0.5)
EOSINOPHIL NFR BLD AUTO: 0.1 % — SIGNIFICANT CHANGE UP (ref 0–6)
GLUCOSE SERPL-MCNC: 147 MG/DL — HIGH (ref 70–99)
GRAM STN FLD: SIGNIFICANT CHANGE UP
HCT VFR BLD CALC: 36.1 % — LOW (ref 39–50)
HGB BLD-MCNC: 11.9 G/DL — LOW (ref 13–17)
IANC: 6.75 K/UL — SIGNIFICANT CHANGE UP (ref 1.5–8.5)
IMM GRANULOCYTES NFR BLD AUTO: 0.6 % — SIGNIFICANT CHANGE UP (ref 0–1.5)
INR BLD: 1.08 RATIO — SIGNIFICANT CHANGE UP (ref 0.88–1.16)
LYMPHOCYTES # BLD AUTO: 0.9 K/UL — LOW (ref 1–3.3)
LYMPHOCYTES # BLD AUTO: 10.3 % — LOW (ref 13–44)
MCHC RBC-ENTMCNC: 31.2 PG — SIGNIFICANT CHANGE UP (ref 27–34)
MCHC RBC-ENTMCNC: 33 GM/DL — SIGNIFICANT CHANGE UP (ref 32–36)
MCV RBC AUTO: 94.5 FL — SIGNIFICANT CHANGE UP (ref 80–100)
MONOCYTES # BLD AUTO: 0.98 K/UL — HIGH (ref 0–0.9)
MONOCYTES NFR BLD AUTO: 11.3 % — SIGNIFICANT CHANGE UP (ref 2–14)
NEUTROPHILS # BLD AUTO: 6.75 K/UL — SIGNIFICANT CHANGE UP (ref 1.8–7.4)
NEUTROPHILS NFR BLD AUTO: 77.6 % — HIGH (ref 43–77)
NRBC # BLD: 0 /100 WBCS — SIGNIFICANT CHANGE UP
NRBC # FLD: 0 K/UL — SIGNIFICANT CHANGE UP
PLATELET # BLD AUTO: 139 K/UL — LOW (ref 150–400)
POTASSIUM SERPL-MCNC: 4.3 MMOL/L — SIGNIFICANT CHANGE UP (ref 3.5–5.3)
POTASSIUM SERPL-SCNC: 4.3 MMOL/L — SIGNIFICANT CHANGE UP (ref 3.5–5.3)
PROTHROM AB SERPL-ACNC: 12.3 SEC — SIGNIFICANT CHANGE UP (ref 10.6–13.6)
RBC # BLD: 3.82 M/UL — LOW (ref 4.2–5.8)
RBC # FLD: 13.8 % — SIGNIFICANT CHANGE UP (ref 10.3–14.5)
SODIUM SERPL-SCNC: 142 MMOL/L — SIGNIFICANT CHANGE UP (ref 135–145)
WBC # BLD: 8.7 K/UL — SIGNIFICANT CHANGE UP (ref 3.8–10.5)
WBC # FLD AUTO: 8.7 K/UL — SIGNIFICANT CHANGE UP (ref 3.8–10.5)

## 2021-01-08 PROCEDURE — 99291 CRITICAL CARE FIRST HOUR: CPT

## 2021-01-08 PROCEDURE — 71045 X-RAY EXAM CHEST 1 VIEW: CPT | Mod: 26

## 2021-01-08 RX ORDER — HEPARIN SODIUM 5000 [USP'U]/ML
5000 INJECTION INTRAVENOUS; SUBCUTANEOUS EVERY 8 HOURS
Refills: 0 | Status: DISCONTINUED | OUTPATIENT
Start: 2021-01-08 | End: 2021-01-11

## 2021-01-08 RX ORDER — TEMAZEPAM 15 MG/1
15 CAPSULE ORAL ONCE
Refills: 0 | Status: DISCONTINUED | OUTPATIENT
Start: 2021-01-08 | End: 2021-01-09

## 2021-01-08 RX ORDER — ACETAMINOPHEN 500 MG
1000 TABLET ORAL ONCE
Refills: 0 | Status: COMPLETED | OUTPATIENT
Start: 2021-01-08 | End: 2021-01-09

## 2021-01-08 RX ORDER — ALBUMIN HUMAN 25 %
250 VIAL (ML) INTRAVENOUS ONCE
Refills: 0 | Status: COMPLETED | OUTPATIENT
Start: 2021-01-08 | End: 2021-01-08

## 2021-01-08 RX ORDER — ACETAMINOPHEN 500 MG
1000 TABLET ORAL ONCE
Refills: 0 | Status: DISCONTINUED | OUTPATIENT
Start: 2021-01-09 | End: 2021-01-09

## 2021-01-08 RX ORDER — PHENYLEPHRINE HYDROCHLORIDE 10 MG/ML
0.2 INJECTION INTRAVENOUS
Qty: 40 | Refills: 0 | Status: DISCONTINUED | OUTPATIENT
Start: 2021-01-08 | End: 2021-01-11

## 2021-01-08 RX ADMIN — SODIUM CHLORIDE 50 MILLILITER(S): 9 INJECTION, SOLUTION INTRAVENOUS at 07:21

## 2021-01-08 RX ADMIN — SODIUM CHLORIDE 50 MILLILITER(S): 9 INJECTION, SOLUTION INTRAVENOUS at 21:47

## 2021-01-08 RX ADMIN — HEPARIN SODIUM 500 UNIT(S): 5000 INJECTION INTRAVENOUS; SUBCUTANEOUS at 05:01

## 2021-01-08 RX ADMIN — PHENYLEPHRINE HYDROCHLORIDE 8.03 MICROGRAM(S)/KG/MIN: 10 INJECTION INTRAVENOUS at 21:47

## 2021-01-08 RX ADMIN — PROPOFOL 32.1 MICROGRAM(S)/KG/MIN: 10 INJECTION, EMULSION INTRAVENOUS at 08:34

## 2021-01-08 RX ADMIN — HEPARIN SODIUM 5000 UNIT(S): 5000 INJECTION INTRAVENOUS; SUBCUTANEOUS at 21:46

## 2021-01-08 RX ADMIN — Medication 125 MILLILITER(S): at 14:00

## 2021-01-08 RX ADMIN — Medication 2 MILLIGRAM(S): at 21:46

## 2021-01-08 RX ADMIN — CLOZAPINE 200 MILLIGRAM(S): 150 TABLET, ORALLY DISINTEGRATING ORAL at 21:46

## 2021-01-08 RX ADMIN — DEXMEDETOMIDINE HYDROCHLORIDE IN 0.9% SODIUM CHLORIDE 13.4 MICROGRAM(S)/KG/HR: 4 INJECTION INTRAVENOUS at 08:34

## 2021-01-08 RX ADMIN — FAMOTIDINE 20 MILLIGRAM(S): 10 INJECTION INTRAVENOUS at 18:33

## 2021-01-08 RX ADMIN — ATORVASTATIN CALCIUM 20 MILLIGRAM(S): 80 TABLET, FILM COATED ORAL at 21:46

## 2021-01-08 RX ADMIN — Medication 125 MILLILITER(S): at 12:12

## 2021-01-08 RX ADMIN — Medication 500 MILLILITER(S): at 06:48

## 2021-01-08 RX ADMIN — PHENYLEPHRINE HYDROCHLORIDE 8.03 MICROGRAM(S)/KG/MIN: 10 INJECTION INTRAVENOUS at 08:34

## 2021-01-08 RX ADMIN — HEPARIN SODIUM 5000 UNIT(S): 5000 INJECTION INTRAVENOUS; SUBCUTANEOUS at 13:46

## 2021-01-08 RX ADMIN — CHLORHEXIDINE GLUCONATE 15 MILLILITER(S): 213 SOLUTION TOPICAL at 05:01

## 2021-01-08 NOTE — PATIENT PROFILE ADULT - FUNCTIONAL SCREEN CURRENT LEVEL: SWALLOWING (IF SCORE 2 OR MORE FOR ANY ITEM, CONSULT REHAB SERVICES), MLM)
unable to assess - patient intubated unable to assess - patient intubated/0 = swallows foods/liquids without difficulty

## 2021-01-08 NOTE — PROGRESS NOTE ADULT - SUBJECTIVE AND OBJECTIVE BOX
Anesthesia Pain Management Service: Day 2 of Epidural    SUBJECTIVE: Patient intubated, sedated.     Pain Scale Score: N/A  Refer to charted pain scores    THERAPY:  [x ] Epidural Bupivacaine 0.0625% and Hydromorphone  		[ X] 10 micrograms/mL	[ ] 5 micrograms/mL  [ ] Epidural Bupivacaine 0.0625% and Fentanyl - 2 micrograms/mL  [ ] Epidural Ropivacaine 0.1% plain – 1 mg/mL  [ ] Patient Controlled Regional Anesthesia (PCRA) Ropivacaine  		[ ] 0.2%			[ ] 0.1%    Demand dose __3_ lockout __15_ (minutes) Continuous Rate 2cc/hr Total: 99.05ml used (in past 24 hours)      MEDICATIONS  (STANDING):  atorvastatin 20 milliGRAM(s) Oral at bedtime  chlorhexidine 0.12% Liquid 15 milliLiter(s) Oral Mucosa every 12 hours  clonazePAM  Tablet 2 milliGRAM(s) Oral at bedtime  cloZAPine 200 milliGRAM(s) Oral at bedtime  dexMEDEtomidine Infusion 0.5 MICROgram(s)/kG/Hr (13.4 mL/Hr) IV Continuous <Continuous>  famotidine    Tablet 20 milliGRAM(s) Oral every 12 hours  heparin   Injectable 500 Unit(s) SubCutaneous every 8 hours  hydromorphone (10 MICROgram(s)/mL) + bupivacaine 0.0625% in 0.9% Sodium Chloride PCEA 250 milliLiter(s) Epidural PCA Continuous  lactated ringers. 1000 milliLiter(s) (50 mL/Hr) IV Continuous <Continuous>  PARoxetine 30 milliGRAM(s) Oral daily  phenylephrine    Infusion 0.2 MICROgram(s)/kG/Min (8.03 mL/Hr) IV Continuous <Continuous>  propofol Infusion 50 MICROgram(s)/kG/Min (32.1 mL/Hr) IV Continuous <Continuous>  senna 2 Tablet(s) Oral at bedtime    MEDICATIONS  (PRN):  hydromorphone (10 MICROgram(s)/mL) + bupivacaine 0.0625% in 0.9% Sodium Chloride PCEA Rescue Clinician  Bolus 5 milliLiter(s) Epidural every 15 minutes PRN for Pain Score greater than 6  naloxone Injectable 0.1 milliGRAM(s) IV Push every 3 minutes PRN For ANY of the following changes in patient status:  A. RR LESS THAN 10 breaths per minute, B. Oxygen saturation LESS THAN 90%, C. Sedation score of 6  ondansetron Injectable 4 milliGRAM(s) IV Push every 6 hours PRN Nausea      OBJECTIVE: In bed, intubated, sedated. Appears comfortable. +Chest tube    Assessment of Catheter Site:	[ ] Left	[ ] Right  [x ] Epidural 	[ ] Femoral	      [ ] Saphenous   [ ] Supraclavicular   [ ] Other:    [x ] Dressing intact	[x ] Site non-tender	[ x] Site without erythema, discharge, edema  [x ] Epidural tubing and connection checked	[x] Gross neurological exam within normal limits  [ ] Catheter removed – tip intact		[ ] Afebrile  	[ ] Febrile: ___   [ X] see Temp under VS below)    PT/INR - ( 08 Jan 2021 02:56 )   PT: 12.3 sec;   INR: 1.08 ratio         PTT - ( 08 Jan 2021 02:56 )  PTT:26.7 sec                      11.9   8.70  )-----------( 139      ( 08 Jan 2021 02:56 )             36.1     Vital Signs Last 24 Hrs  T(C): 37.2 (01-08-21 @ 08:00), Max: 37.2 (01-08-21 @ 08:00)  T(F): 98.9 (01-08-21 @ 08:00), Max: 98.9 (01-08-21 @ 08:00)  HR: 51 (01-08-21 @ 08:00) (51 - 82)  BP: 88/61 (01-08-21 @ 05:00) (88/61 - 96/66)  BP(mean): 71 (01-08-21 @ 05:00) (71 - 76)  RR: 13 (01-08-21 @ 08:00) (13 - 21)  SpO2: 100% (01-08-21 @ 08:00) (98% - 100%)      Sedation Score:	[ ] Alert	[ ] Drowsy	[ ] Arousable	[X] Asleep	[ ] Unresponsive    Side Effects:	[ ] None	[ ] Nausea	[ ] Vomiting	[ ] Pruritus  		[ ] Weakness		[ ] Numbness	[X ] Other: N/A due to sedation    ASSESSMENT/ PLAN:    Therapy to  be:	[x ] Continue   [ ] Discontinued   [ ] Change to prn Analgesics    Documentation and Verification of current medications:  [ X ] Done	[ ] Not done, not eligible, reason:    Comments: Patient currently intubated and sedated with PCEA ordered. Continuous infusion lowered by primary team to 2.0cc/hr. Can continue PCEA infusion at 2.0cc/hr.     Progress Note written now but Patient was seen earlier.

## 2021-01-08 NOTE — PROGRESS NOTE ADULT - SUBJECTIVE AND OBJECTIVE BOX
ANESTHESIA POSTOP CHECK    60y Male POSTOP DAY 1 S/P R upper lobectomy    Vital Signs Last 24 Hrs  T(C): 37.2 (08 Jan 2021 08:00), Max: 37.2 (08 Jan 2021 08:00)  T(F): 98.9 (08 Jan 2021 08:00), Max: 98.9 (08 Jan 2021 08:00)  HR: 52 (08 Jan 2021 09:00) (51 - 82)  BP: 85/59 (08 Jan 2021 08:00) (85/59 - 96/66)  BP(mean): 69 (08 Jan 2021 08:00) (69 - 76)  RR: 13 (08 Jan 2021 09:00) (13 - 21)  SpO2: 100% (08 Jan 2021 09:00) (98% - 100%)  I&O's Summary    07 Jan 2021 07:01  -  08 Jan 2021 07:00  --------------------------------------------------------  IN: 1696.9 mL / OUT: 1698 mL / NET: -1.1 mL    08 Jan 2021 07:01  -  08 Jan 2021 09:12  --------------------------------------------------------  IN: 221.1 mL / OUT: 65 mL / NET: 156.1 mL        [X ] NO APPARENT ANESTHESIA COMPLICATIONS      Comments: Patient remains intubated and sedated

## 2021-01-08 NOTE — PROGRESS NOTE ADULT - SUBJECTIVE AND OBJECTIVE BOX
JAME CHAIDEZ      60y   Male   [id number]         No Known Allergies             Daily     Daily Drug Dosing Weight  Height (cm): 177.8 (07 Jan 2021 07:28)  Weight (kg): 107 (07 Jan 2021 07:28)  BMI (kg/m2): 33.8 (07 Jan 2021 07:28)  BSA (m2): 2.24 (07 Jan 2021 07:28)    HPI:  61y/o male present for preop eval for scheduled bronchoscopy possible right upper lobectomy.  Pt with c/o h/o recent pneumonia.  Imaging study done Dx with nodule in right upper lung.   (05 Jan 2021 11:31)      Procedure: R thoracotomy RUL lobectomy 1/7/2021                         Issues:              Acute respiratory failure  Shock - Unspecified  Lung nodule              Postop pain  Schizophrenia  HLD  Hx of ETOH  Hx of smoking                 Home Medications:  clonazePAM 2 mg oral tablet: 1 tab(s) orally once a day (at bedtime) (07 Jan 2021 07:44)  Clozaril 100 mg oral tablet: 2 tab(s) orally once a day (in the evening) (07 Jan 2021 07:44)  Paxil 30 mg oral tablet: 2 tab(s) orally once a day (07 Jan 2021 07:44)  rosuvastatin 5 mg oral tablet: 1 tab(s) orally once a day (at bedtime) (07 Jan 2021 07:44)      PAST MEDICAL & SURGICAL HISTORY:  H/O pleural effusion  DX IN 2011 when pt had pneumonia  chronic condition now    Obesity    Migraines    Pneumonia  2011    Smoking hx    H/O ETOH abuse  sober x 20 yrs    Drug abuse, in remission  sober x 20 yrs    HLD (hyperlipidemia)    Schizophrenia    Skin lesion  face   &quot;pre-cancerous&quot;    Cyst  scalp excised 20 yrs ago      Vital Signs Last 24 Hrs  T(C): 36.7 (08 Jan 2021 12:00), Max: 37.2 (08 Jan 2021 08:00)  T(F): 98 (08 Jan 2021 12:00), Max: 98.9 (08 Jan 2021 08:00)  HR: 59 (08 Jan 2021 13:00) (51 - 82)  BP: 85/59 (08 Jan 2021 08:00) (85/59 - 96/66)  BP(mean): 69 (08 Jan 2021 08:00) (69 - 76)  RR: 14 (08 Jan 2021 13:00) (14 - 21)  SpO2: 100% (08 Jan 2021 13:00) (98% - 100%)  I&O's Detail    07 Jan 2021 07:01  -  08 Jan 2021 07:00  --------------------------------------------------------  IN:    Dexmedetomidine: 415.3 mL    IV PiggyBack: 250 mL    Lactated Ringers: 550 mL    Propofol: 481.6 mL  Total IN: 1696.9 mL    OUT:    Chest Tube (mL): 68 mL    Chest Tube (mL): 190 mL    Indwelling Catheter - Urethral (mL): 1440 mL  Total OUT: 1698 mL    Total NET: -1.1 mL      08 Jan 2021 07:01  -  08 Jan 2021 13:27  --------------------------------------------------------  IN:    Dexmedetomidine: 101.8 mL    IV PiggyBack: 250 mL    Lactated Ringers: 300 mL    Phenylephrine: 72.1 mL    Propofol: 51.5 mL  Total IN: 775.4 mL    OUT:    Chest Tube (mL): 20 mL    Chest Tube (mL): 70 mL    Indwelling Catheter - Urethral (mL): 160 mL  Total OUT: 250 mL    Total NET: 525.4 mL      Home Medications:  clonazePAM 2 mg oral tablet: 1 tab(s) orally once a day (at bedtime) (07 Jan 2021 07:44)  Clozaril 100 mg oral tablet: 2 tab(s) orally once a day (in the evening) (07 Jan 2021 07:44)  Paxil 30 mg oral tablet: 2 tab(s) orally once a day (07 Jan 2021 07:44)  rosuvastatin 5 mg oral tablet: 1 tab(s) orally once a day (at bedtime) (07 Jan 2021 07:44)      MEDICATIONS  (STANDING):  atorvastatin 20 milliGRAM(s) Oral at bedtime  chlorhexidine 0.12% Liquid 15 milliLiter(s) Oral Mucosa every 12 hours  clonazePAM  Tablet 2 milliGRAM(s) Oral at bedtime  cloZAPine 200 milliGRAM(s) Oral at bedtime  dexMEDEtomidine Infusion 0.5 MICROgram(s)/kG/Hr (13.4 mL/Hr) IV Continuous <Continuous>  famotidine    Tablet 20 milliGRAM(s) Oral every 12 hours  heparin   Injectable 500 Unit(s) SubCutaneous every 8 hours  hydromorphone (10 MICROgram(s)/mL) + bupivacaine 0.0625% in 0.9% Sodium Chloride PCEA 250 milliLiter(s) Epidural PCA Continuous  lactated ringers. 1000 milliLiter(s) (50 mL/Hr) IV Continuous <Continuous>  PARoxetine 30 milliGRAM(s) Oral daily  phenylephrine    Infusion 0.2 MICROgram(s)/kG/Min (8.03 mL/Hr) IV Continuous <Continuous>  propofol Infusion 50 MICROgram(s)/kG/Min (32.1 mL/Hr) IV Continuous <Continuous>  senna 2 Tablet(s) Oral at bedtime    MEDICATIONS  (PRN):  hydromorphone (10 MICROgram(s)/mL) + bupivacaine 0.0625% in 0.9% Sodium Chloride PCEA Rescue Clinician  Bolus 5 milliLiter(s) Epidural every 15 minutes PRN for Pain Score greater than 6  naloxone Injectable 0.1 milliGRAM(s) IV Push every 3 minutes PRN For ANY of the following changes in patient status:  A. RR LESS THAN 10 breaths per minute, B. Oxygen saturation LESS THAN 90%, C. Sedation score of 6  ondansetron Injectable 4 milliGRAM(s) IV Push every 6 hours PRN Nausea      Mode: AC/ CMV (Assist Control/ Continuous Mandatory Ventilation)  RR (machine): 14  TV (machine): 450  FiO2: 50  PEEP: 5  ITime: 0.8  MAP: 9  PIP: 20      Physical exam:                             General:               Pt is awake, alert, on vent support,  appears to be comfortable                                                  Neuro:                  Nonfocal                             Cardiovascular:   S1 & S2, regular                          Respiratory:         Air entry is fair and equal on both sides, has bilateral conducted sounds                           GI:                          Soft, nondistended and nontender, Bowel sounds active                            Ext:                        No cyanosis or edema     Labs:                                                                           11.9   8.70  )-----------( 139      ( 08 Jan 2021 02:56 )             36.1             01-08    142  |  110<H>  |  18  ----------------------------<  147<H>  4.3   |  21<L>  |  1.12    Ca    8.2<L>      08 Jan 2021 02:56    TPro  6.3  /  Alb  3.9  /  TBili  0.7  /  DBili  x   /  AST  26  /  ALT  19  /  AlkPhos  70  01-07                  PT/INR - ( 08 Jan 2021 02:56 )   PT: 12.3 sec;   INR: 1.08 ratio         PTT - ( 08 Jan 2021 02:56 )  PTT:26.7 sec  LIVER FUNCTIONS - ( 07 Jan 2021 18:29 )  Alb: 3.9 g/dL / Pro: 6.3 g/dL / ALK PHOS: 70 U/L / ALT: 19 U/L / AST: 26 U/L / GGT: x             Culture - Fungal, Tissue (collected 07 Jan 2021 16:42)  Source: .Tissue level 10 node  Preliminary Report (08 Jan 2021 06:35):    Testing in progress    Culture - Acid Fast - Tissue w/Smear (collected 07 Jan 2021 16:42)  Source: .Tissue level 10 node      CXR:  < from: Xray Chest 1 View AP/PA (01.08.21 @ 05:41) >  INTERPRETATION:    The endotracheal and right-sided chest tubes are unchanged. Improved aeration of the right lung since the last exam. Left lung is clear aside from lower lobe streaky atelectasis. No pneumothorax.    COMPARISON:  January 7    IMPRESSION:  Status post right thoracotomy with chest tube.      Plan:    General: 60yMale s/p R thoracotomy RUL lobectomy 1/7/2021. Postop events significant for reintubation in the O.R. Pt was kept intubated overnight, bronched in AM.                             Neuro:                                         Pain control with PCEA /  Tylenol IV PRN    Schizophrenia: Restart Clonazepam / Clozapine and Paxil after extubation today                            Cardiovascular:                                          Shock - Unspecified: Continue hemodynamic monitoring and titrate Rock to MAP >65    Received 2 albumins this AM    HLD: May restart Statin after extubation                          Respiratory:                                         Pt is on full mechanical ventilator support BQ--40-5%  CPAP wean and possible extubation today                                         Comfortable, not in any distress.                                         Monitor chest tube output                                         Chest tube to suction                                                            Continue bronchodilators, pulmonary toilet                            GI                                         NPO for now                                         Continue Zofran / Reglan for nausea - PRN	                                                                 Renal:                                         Continue LR  30cc/hr                                         Monitor I/Os and electrolytes                                                                                        Hem/ Onc:                                                                                  Monitor chest tube output &  signs of bleeding.                                          Follow CBC in AM                           Infectious disease:                                            Monitor for fever / leukocytosis.                                          All surgical incision / chest tube  sites look clean                            Endocrine                                            Continue Accu-Checks with coverage    Pt is on SQ Heparin and Venodyne boots for DVT prophylaxis.     Pertinent clinical, laboratory, radiographic, hemodynamic, echocardiographic, respiratory data, microbiologic data and chart were reviewed and analyzed frequently throughout the course of the day and night  Patient seen, examined and plan discussed with CT Surgeon   / CTICU team during rounds.    Status discussed with patient /  updated plan of care    I have spent 75 minutes of critical care time with this pt between 7am and 11.59pm            Armando Barajas MD

## 2021-01-08 NOTE — PATIENT PROFILE ADULT - VISION (WITH CORRECTIVE LENSES IF THE PATIENT USUALLY WEARS THEM):
unable to assess - patient intubated unable to assess - patient intubated/Partially impaired: cannot see medication labels or newsprint, but can see obstacles in path, and the surrounding layout; can count fingers at arm's length

## 2021-01-09 LAB
ANION GAP SERPL CALC-SCNC: 11 MMOL/L — SIGNIFICANT CHANGE UP (ref 7–14)
APTT BLD: 26.5 SEC — LOW (ref 27–36.3)
BUN SERPL-MCNC: 18 MG/DL — SIGNIFICANT CHANGE UP (ref 7–23)
CALCIUM SERPL-MCNC: 7.9 MG/DL — LOW (ref 8.4–10.5)
CHLORIDE SERPL-SCNC: 106 MMOL/L — SIGNIFICANT CHANGE UP (ref 98–107)
CO2 SERPL-SCNC: 24 MMOL/L — SIGNIFICANT CHANGE UP (ref 22–31)
CREAT SERPL-MCNC: 1.2 MG/DL — SIGNIFICANT CHANGE UP (ref 0.5–1.3)
GLUCOSE BLDC GLUCOMTR-MCNC: 112 MG/DL — HIGH (ref 70–99)
GLUCOSE SERPL-MCNC: 106 MG/DL — HIGH (ref 70–99)
HCT VFR BLD CALC: 31.2 % — LOW (ref 39–50)
HGB BLD-MCNC: 10.2 G/DL — LOW (ref 13–17)
INR BLD: 1.07 RATIO — SIGNIFICANT CHANGE UP (ref 0.88–1.16)
MAGNESIUM SERPL-MCNC: 1.9 MG/DL — SIGNIFICANT CHANGE UP (ref 1.6–2.6)
MCHC RBC-ENTMCNC: 30.6 PG — SIGNIFICANT CHANGE UP (ref 27–34)
MCHC RBC-ENTMCNC: 32.7 GM/DL — SIGNIFICANT CHANGE UP (ref 32–36)
MCV RBC AUTO: 93.7 FL — SIGNIFICANT CHANGE UP (ref 80–100)
NRBC # BLD: 0 /100 WBCS — SIGNIFICANT CHANGE UP
NRBC # FLD: 0 K/UL — SIGNIFICANT CHANGE UP
PHOSPHATE SERPL-MCNC: 1.8 MG/DL — LOW (ref 2.5–4.5)
PLATELET # BLD AUTO: 123 K/UL — LOW (ref 150–400)
POTASSIUM SERPL-MCNC: 3.3 MMOL/L — LOW (ref 3.5–5.3)
POTASSIUM SERPL-SCNC: 3.3 MMOL/L — LOW (ref 3.5–5.3)
PROTHROM AB SERPL-ACNC: 12.2 SEC — SIGNIFICANT CHANGE UP (ref 10.6–13.6)
RBC # BLD: 3.33 M/UL — LOW (ref 4.2–5.8)
RBC # FLD: 14.5 % — SIGNIFICANT CHANGE UP (ref 10.3–14.5)
SODIUM SERPL-SCNC: 141 MMOL/L — SIGNIFICANT CHANGE UP (ref 135–145)
WBC # BLD: 6.22 K/UL — SIGNIFICANT CHANGE UP (ref 3.8–10.5)
WBC # FLD AUTO: 6.22 K/UL — SIGNIFICANT CHANGE UP (ref 3.8–10.5)

## 2021-01-09 PROCEDURE — 71045 X-RAY EXAM CHEST 1 VIEW: CPT | Mod: 26

## 2021-01-09 PROCEDURE — 71045 X-RAY EXAM CHEST 1 VIEW: CPT | Mod: 26,77

## 2021-01-09 PROCEDURE — 99291 CRITICAL CARE FIRST HOUR: CPT

## 2021-01-09 RX ORDER — MAGNESIUM SULFATE 500 MG/ML
2 VIAL (ML) INJECTION ONCE
Refills: 0 | Status: COMPLETED | OUTPATIENT
Start: 2021-01-09 | End: 2021-01-09

## 2021-01-09 RX ORDER — POTASSIUM PHOSPHATE, MONOBASIC POTASSIUM PHOSPHATE, DIBASIC 236; 224 MG/ML; MG/ML
30 INJECTION, SOLUTION INTRAVENOUS ONCE
Refills: 0 | Status: COMPLETED | OUTPATIENT
Start: 2021-01-09 | End: 2021-01-09

## 2021-01-09 RX ORDER — IPRATROPIUM/ALBUTEROL SULFATE 18-103MCG
3 AEROSOL WITH ADAPTER (GRAM) INHALATION EVERY 6 HOURS
Refills: 0 | Status: DISCONTINUED | OUTPATIENT
Start: 2021-01-09 | End: 2021-01-19

## 2021-01-09 RX ORDER — POTASSIUM CHLORIDE 20 MEQ
40 PACKET (EA) ORAL ONCE
Refills: 0 | Status: COMPLETED | OUTPATIENT
Start: 2021-01-09 | End: 2021-01-09

## 2021-01-09 RX ORDER — SODIUM CHLORIDE 9 MG/ML
4 INJECTION INTRAMUSCULAR; INTRAVENOUS; SUBCUTANEOUS EVERY 6 HOURS
Refills: 0 | Status: DISCONTINUED | OUTPATIENT
Start: 2021-01-09 | End: 2021-01-19

## 2021-01-09 RX ORDER — DORNASE ALFA 1 MG/ML
2.5 SOLUTION RESPIRATORY (INHALATION) EVERY 12 HOURS
Refills: 0 | Status: DISCONTINUED | OUTPATIENT
Start: 2021-01-09 | End: 2021-01-15

## 2021-01-09 RX ORDER — ACETAMINOPHEN 500 MG
1000 TABLET ORAL ONCE
Refills: 0 | Status: DISCONTINUED | OUTPATIENT
Start: 2021-01-09 | End: 2021-01-19

## 2021-01-09 RX ADMIN — HEPARIN SODIUM 5000 UNIT(S): 5000 INJECTION INTRAVENOUS; SUBCUTANEOUS at 23:54

## 2021-01-09 RX ADMIN — Medication 3 MILLILITER(S): at 10:46

## 2021-01-09 RX ADMIN — ATORVASTATIN CALCIUM 20 MILLIGRAM(S): 80 TABLET, FILM COATED ORAL at 23:54

## 2021-01-09 RX ADMIN — FAMOTIDINE 20 MILLIGRAM(S): 10 INJECTION INTRAVENOUS at 19:00

## 2021-01-09 RX ADMIN — Medication 3 MILLILITER(S): at 20:08

## 2021-01-09 RX ADMIN — Medication 40 MILLIEQUIVALENT(S): at 10:14

## 2021-01-09 RX ADMIN — SODIUM CHLORIDE 4 MILLILITER(S): 9 INJECTION INTRAMUSCULAR; INTRAVENOUS; SUBCUTANEOUS at 20:09

## 2021-01-09 RX ADMIN — HEPARIN SODIUM 5000 UNIT(S): 5000 INJECTION INTRAVENOUS; SUBCUTANEOUS at 05:22

## 2021-01-09 RX ADMIN — Medication 400 MILLIGRAM(S): at 12:52

## 2021-01-09 RX ADMIN — FAMOTIDINE 20 MILLIGRAM(S): 10 INJECTION INTRAVENOUS at 05:22

## 2021-01-09 RX ADMIN — Medication 2 MILLIGRAM(S): at 23:54

## 2021-01-09 RX ADMIN — SENNA PLUS 2 TABLET(S): 8.6 TABLET ORAL at 23:55

## 2021-01-09 RX ADMIN — TEMAZEPAM 15 MILLIGRAM(S): 15 CAPSULE ORAL at 00:02

## 2021-01-09 RX ADMIN — Medication 50 GRAM(S): at 10:14

## 2021-01-09 RX ADMIN — DORNASE ALFA 2.5 MILLIGRAM(S): 1 SOLUTION RESPIRATORY (INHALATION) at 20:09

## 2021-01-09 RX ADMIN — CLOZAPINE 200 MILLIGRAM(S): 150 TABLET, ORALLY DISINTEGRATING ORAL at 23:54

## 2021-01-09 RX ADMIN — Medication 30 MILLIGRAM(S): at 12:52

## 2021-01-09 RX ADMIN — POTASSIUM PHOSPHATE, MONOBASIC POTASSIUM PHOSPHATE, DIBASIC 83.33 MILLIMOLE(S): 236; 224 INJECTION, SOLUTION INTRAVENOUS at 10:15

## 2021-01-09 RX ADMIN — HEPARIN SODIUM 5000 UNIT(S): 5000 INJECTION INTRAVENOUS; SUBCUTANEOUS at 13:49

## 2021-01-09 RX ADMIN — SODIUM CHLORIDE 4 MILLILITER(S): 9 INJECTION INTRAMUSCULAR; INTRAVENOUS; SUBCUTANEOUS at 10:52

## 2021-01-09 NOTE — PROGRESS NOTE ADULT - SUBJECTIVE AND OBJECTIVE BOX
Anesthesia Pain Management Service: Day __ of Epidural    SUBJECTIVE: Patient doing well with PCEA and no problems.    Pain Scale Score: 0/10 at rest and 10/10 when coughing  Refer to charted pain scores    THERAPY:  [x ] Epidural Bupivacaine 0.0625% and Hydromorphone  		[ X] 10 micrograms/mL	[ ] 5 micrograms/mL  [ ] Epidural Bupivacaine 0.0625% and Fentanyl - 2 micrograms/mL  [ ] Epidural Ropivacaine 0.1% plain – 1 mg/mL  [ ] Patient Controlled Regional Anesthesia (PCRA) Ropivacaine  		[ ] 0.2%			[ ] 0.1%    Demand dose __3_ lockout __15_ (minutes) Continuous Rate _2__ Total: __100.70__ ml used (in past 24 hours)      MEDICATIONS  (STANDING):  albuterol/ipratropium for Nebulization 3 milliLiter(s) Nebulizer every 6 hours  atorvastatin 20 milliGRAM(s) Oral at bedtime  clonazePAM  Tablet 2 milliGRAM(s) Oral at bedtime  cloZAPine 200 milliGRAM(s) Oral at bedtime  dornase terry Solution 2.5 milliGRAM(s) Inhalation every 12 hours  famotidine    Tablet 20 milliGRAM(s) Oral every 12 hours  heparin   Injectable 5000 Unit(s) SubCutaneous every 8 hours  hydromorphone (10 MICROgram(s)/mL) + bupivacaine 0.0625% in 0.9% Sodium Chloride PCEA 250 milliLiter(s) Epidural PCA Continuous  lactated ringers. 1000 milliLiter(s) (50 mL/Hr) IV Continuous <Continuous>  magnesium sulfate  IVPB 2 Gram(s) IV Intermittent once  PARoxetine 30 milliGRAM(s) Oral daily  phenylephrine    Infusion 0.2 MICROgram(s)/kG/Min (8.03 mL/Hr) IV Continuous <Continuous>  potassium chloride   Powder 40 milliEquivalent(s) Oral once  potassium phosphate IVPB 30 milliMole(s) IV Intermittent once  senna 2 Tablet(s) Oral at bedtime  sodium chloride 3%  Inhalation 4 milliLiter(s) Inhalation every 6 hours    MEDICATIONS  (PRN):  acetaminophen  IVPB .. 1000 milliGRAM(s) IV Intermittent once PRN Temp greater or equal to 38C (100.4F), Mild Pain (1 - 3)  acetaminophen  IVPB .. 1000 milliGRAM(s) IV Intermittent once PRN Temp greater or equal to 38C (100.4F), Mild Pain (1 - 3)  hydromorphone (10 MICROgram(s)/mL) + bupivacaine 0.0625% in 0.9% Sodium Chloride PCEA Rescue Clinician  Bolus 5 milliLiter(s) Epidural every 15 minutes PRN for Pain Score greater than 6  naloxone Injectable 0.1 milliGRAM(s) IV Push every 3 minutes PRN For ANY of the following changes in patient status:  A. RR LESS THAN 10 breaths per minute, B. Oxygen saturation LESS THAN 90%, C. Sedation score of 6  ondansetron Injectable 4 milliGRAM(s) IV Push every 6 hours PRN Nausea      OBJECTIVE:  Patient is sitting up in chair, with CT x2 and valdez.    Assessment of Catheter Site:	[ ] Left	[ ] Right  [x ] Epidural 	[ ] Femoral	      [ ] Saphenous   [ ] Supraclavicular   [ ] Other:    [x ] Dressing intact	[x ] Site non-tender	[ x] Site without erythema, discharge, edema  [x ] Epidural tubing and connection checked	[x] Gross neurological exam within normal limits  [ ] Catheter removed – tip intact		[ ] Afebrile  	[ ] Febrile: ___   [ X] see Temp under VS below)    PT/INR - ( 09 Jan 2021 06:43 )   PT: 12.2 sec;   INR: 1.07 ratio         PTT - ( 09 Jan 2021 06:43 )  PTT:26.5 sec                      10.2   6.22  )-----------( 123      ( 09 Jan 2021 06:43 )             31.2     Vital Signs Last 24 Hrs  T(C): 38.8 (01-09-21 @ 08:00), Max: 38.8 (01-09-21 @ 08:00)  T(F): 101.9 (01-09-21 @ 08:00), Max: 101.9 (01-09-21 @ 08:00)  HR: 87 (01-09-21 @ 08:00) (52 - 96)  BP: 112/94 (01-08-21 @ 18:00) (102/60 - 112/94)  BP(mean): 101 (01-08-21 @ 18:00) (73 - 101)  RR: 16 (01-09-21 @ 08:00) (14 - 23)  SpO2: 99% (01-09-21 @ 08:00) (93% - 100%)      Sedation Score:	[x ] Alert	[ ] Drowsy	[ ] Arousable	[ ] Asleep	[ ] Unresponsive    Side Effects:	[x ] None	[ ] Nausea	[ ] Vomiting	[ ] Pruritus  		[ ] Weakness		[ ] Numbness	[ ] Other:    ASSESSMENT/ PLAN:    Therapy to  be:	[x ] Continue   [ ] Discontinued   [ ] Change to prn Analgesics    Documentation and Verification of current medications:  [ X ] Done	[ ] Not done, not eligible, reason:    Comments: SBP been low all last night and high 90's this morning. Doing OK with epidural and may continue.  Recommend non-opioid adjuvant analgesics to be used when possible and when allowed by primary surgical team.    Progress Note written now but Patient was seen earlier.

## 2021-01-09 NOTE — PROGRESS NOTE ADULT - SUBJECTIVE AND OBJECTIVE BOX
JAME CHAIDEZ                     [id number]    HPI:  59y/o male present for preop eval for scheduled bronchoscopy possible right upper lobectomy.  Pt with c/o h/o recent pneumonia.  Imaging study done Dx with nodule in right upper lung.   (05 Jan 2021 11:31)        Procedure: R thoracotomy RUL lobectomy 1/7/2021                         Issues:              Acute respiratory failure  Shock - Unspecified  Lung nodule              Postop pain  Schizophrenia  HLD  Hx of ETOH  Hx of smoking                 Home Medications:  clonazePAM 2 mg oral tablet: 1 tab(s) orally once a day (at bedtime) (07 Jan 2021 07:44)  Clozaril 100 mg oral tablet: 2 tab(s) orally once a day (in the evening) (07 Jan 2021 07:44)  Paxil 30 mg oral tablet: 2 tab(s) orally once a day (07 Jan 2021 07:44)  rosuvastatin 5 mg oral tablet: 1 tab(s) orally once a day (at bedtime) (07 Jan 2021 07:44)      PAST MEDICAL & SURGICAL HISTORY:  H/O pleural effusion  DX IN 2011 when pt had pneumonia  chronic condition now    Obesity    Migraines    Pneumonia  2011    Smoking hx    H/O ETOH abuse  sober x 20 yrs    Drug abuse, in remission  sober x 20 yrs    HLD (hyperlipidemia)    Schizophrenia    Skin lesion  face   &quot;pre-cancerous&quot;    Cyst  scalp excised 20 yrs ago              VITAL SIGNS:  Vital Signs Last 24 Hrs  T(C): 36.9 (09 Jan 2021 04:00), Max: 37.3 (08 Jan 2021 16:00)  T(F): 98.5 (09 Jan 2021 04:00), Max: 99.2 (08 Jan 2021 16:00)  HR: 91 (09 Jan 2021 07:00) (52 - 96)  BP: 112/94 (08 Jan 2021 18:00) (102/60 - 112/94)  BP(mean): 101 (08 Jan 2021 18:00) (73 - 101)  RR: 19 (09 Jan 2021 07:00) (14 - 23)  SpO2: 99% (09 Jan 2021 07:00) (93% - 100%)    I/Os:   I&O's Detail    08 Jan 2021 07:01  -  09 Jan 2021 07:00  --------------------------------------------------------  IN:    Dexmedetomidine: 101.8 mL    IV PiggyBack: 500 mL    Lactated Ringers: 1200 mL    Oral Fluid: 240 mL    Phenylephrine: 785.9 mL    Propofol: 51.5 mL  Total IN: 2879.2 mL    OUT:    Chest Tube (mL): 385 mL    Chest Tube (mL): 215 mL    Indwelling Catheter - Urethral (mL): 1025 mL  Total OUT: 1625 mL    Total NET: 1254.2 mL          CAPILLARY BLOOD GLUCOSE      POCT Blood Glucose.: 112 mg/dL (09 Jan 2021 05:14)      =======================MEDICATIONS===================  MEDICATIONS  (STANDING):  atorvastatin 20 milliGRAM(s) Oral at bedtime  chlorhexidine 0.12% Liquid 15 milliLiter(s) Oral Mucosa every 12 hours  clonazePAM  Tablet 2 milliGRAM(s) Oral at bedtime  cloZAPine 200 milliGRAM(s) Oral at bedtime  dexMEDEtomidine Infusion 0.5 MICROgram(s)/kG/Hr (13.4 mL/Hr) IV Continuous <Continuous>  famotidine    Tablet 20 milliGRAM(s) Oral every 12 hours  heparin   Injectable 5000 Unit(s) SubCutaneous every 8 hours  hydromorphone (10 MICROgram(s)/mL) + bupivacaine 0.0625% in 0.9% Sodium Chloride PCEA 250 milliLiter(s) Epidural PCA Continuous  lactated ringers. 1000 milliLiter(s) (50 mL/Hr) IV Continuous <Continuous>  PARoxetine 30 milliGRAM(s) Oral daily  phenylephrine    Infusion 0.2 MICROgram(s)/kG/Min (8.03 mL/Hr) IV Continuous <Continuous>  potassium chloride   Powder 40 milliEquivalent(s) Oral once  potassium phosphate IVPB 30 milliMole(s) IV Intermittent once  senna 2 Tablet(s) Oral at bedtime    MEDICATIONS  (PRN):  acetaminophen  IVPB .. 1000 milliGRAM(s) IV Intermittent once PRN Temp greater or equal to 38C (100.4F), Mild Pain (1 - 3)  acetaminophen  IVPB .. 1000 milliGRAM(s) IV Intermittent once PRN Temp greater or equal to 38C (100.4F), Mild Pain (1 - 3)  hydromorphone (10 MICROgram(s)/mL) + bupivacaine 0.0625% in 0.9% Sodium Chloride PCEA Rescue Clinician  Bolus 5 milliLiter(s) Epidural every 15 minutes PRN for Pain Score greater than 6  naloxone Injectable 0.1 milliGRAM(s) IV Push every 3 minutes PRN For ANY of the following changes in patient status:  A. RR LESS THAN 10 breaths per minute, B. Oxygen saturation LESS THAN 90%, C. Sedation score of 6  ondansetron Injectable 4 milliGRAM(s) IV Push every 6 hours PRN Nausea    PHYSICAL EXAM============================  General:                         Awake, alert, not in any distress  Neuro:                            Moving all extremities to commands.   Respiratory:	Air entry fair and  bilateral conducted sounds                                           Effort even and unlabored.  CV:		Regular rate and rhythm. Normal S1/S2                                          Distal pulses present.  Abdomen:	                     Soft, non-distended. Bowel sounds present   Skin:		No rash.  Extremities:	Warm, no cyanosis or edema.  Palpable pulses    ============================LABS=========================                        10.2   6.22  )-----------( 123      ( 09 Jan 2021 06:43 )             31.2     01-09    141  |  106  |  18  ----------------------------<  106<H>  3.3<L>   |  24  |  1.20    Ca    7.9<L>      09 Jan 2021 06:43  Phos  1.8     01-09  Mg     1.9     01-09    TPro  6.3  /  Alb  3.9  /  TBili  0.7  /  DBili  x   /  AST  26  /  ALT  19  /  AlkPhos  70  01-07    LIVER FUNCTIONS - ( 07 Jan 2021 18:29 )  Alb: 3.9 g/dL / Pro: 6.3 g/dL / ALK PHOS: 70 U/L / ALT: 19 U/L / AST: 26 U/L / GGT: x           PT/INR - ( 09 Jan 2021 06:43 )   PT: 12.2 sec;   INR: 1.07 ratio         PTT - ( 09 Jan 2021 06:43 )  PTT:26.5 sec  ABG - ( 08 Jan 2021 13:34 )  pH, Arterial: 7.42  pH, Blood: x     /  pCO2: 38    /  pO2: 180   / HCO3: 25    / Base Excess: 0.1   /  SaO2: 99.5      A/P  60yMale s/p R thoracotomy RUL lobectomy 1/7/2021. Postop events significant for reintubation in the O.R. Pt was kept intubated overnight, bronched in AM.                             Neuro:                                         Pain control with PCEA /  Tylenol IV PRN    Schizophrenia: Restart Clonazepam / Clozapine and Paxil after extubation today                            Cardiovascular:                                          Shock - Unspecified: Continue hemodynamic monitoring and titrate Rock to MAP >65  Wean Rock as tolerated     HLD: May restart Statin after extubation                          Respiratory:  Extubated, on 3 L N/C O2                                         Comfortable, not in any distress.                                         Monitor chest tube output                                         Chest tube to water seal                                                           Continue bronchodilators, pulmonary toilet                            GI                                         Start clear liq, advance as tolerated                                         Continue Zofran / Reglan for nausea - PRN	                                                                 Renal:                                         Continue LR                                           Monitor I/Os and electrolytes                                                                                        Hem/ Onc:                                                                                  Monitor chest tube output &  signs of bleeding.                                          Follow CBC in AM                           Infectious disease:                                            Monitor for fever / leukocytosis.                                          All surgical incision / chest tube  sites look clean                            Endocrine                                            Continue Accu-Checks with coverage    Pt is on SQ Heparin and Venodyne boots for DVT prophylaxis.     Pertinent clinical, laboratory, radiographic, hemodynamic, echocardiographic, respiratory data, microbiologic data and chart were reviewed and analyzed frequently throughout the course of the day and night  Patient seen, examined and plan discussed with CT Surgeon   / CTICU team during rounds.    Status discussed with patient /  updated plan of care    I have spent 35 minutes of critical care time with this pt between 7am and 11.59pm          Florina Shipman DO, FACEP

## 2021-01-09 NOTE — PHYSICAL THERAPY INITIAL EVALUATION ADULT - ACTIVE RANGE OF MOTION EXAMINATION, REHAB EVAL
**Cardiac precautions maintained, shoulder flexion not assessed above 90 degrees/bilateral upper extremity Active ROM was WFL (within functional limits)/bilateral  lower extremity Active ROM was WFL (within functional limits)

## 2021-01-09 NOTE — PHYSICAL THERAPY INITIAL EVALUATION ADULT - MD ORDER
· Primary Surgeon	Dr. Roberto Daly  · Co-Surgeon	Dr. Prasad Eagle  · Assistant(s)	Lamine Sutherland PGY5, Mike Knight PGY3

## 2021-01-09 NOTE — PHYSICAL THERAPY INITIAL EVALUATION ADULT - DISCHARGE DISPOSITION, PT EVAL
to improve strength and balance for safe ambulation and transfers to aide in return to prior level of function, please continue to follow due to if pt progresses can go home with home PT./rehabilitation facility

## 2021-01-09 NOTE — PHYSICAL THERAPY INITIAL EVALUATION ADULT - ADDITIONAL COMMENTS
Pt lives alone in apartment, there are no steps to enter or inside but upon discharge pt wants to go to his sisters house where there are 6 steps to enter and flight to bedroom/bathroom. Pt was independent in all ADL prior to admission. Pt denies using assistive device prior to admission.

## 2021-01-09 NOTE — PHYSICAL THERAPY INITIAL EVALUATION ADULT - PHYSICAL ASSIST/NONPHYSICAL ASSIST: SCOOT/BRIDGE, REHAB EVAL
[facility] [place] [address] [address] [facility]  Sond[place]: [phone]  F: [phone]      Reason for Visit:   Ryan Juarez is a 66 y.o. female who is seen in self-referral for evaluation of pancreatic cancer. Treatment History:   · 10/4/18 pancreatic head mass FNA, pancreatic adenocarcinoma    10/15/18  Liver, Core Biopsy w/Touch Prep CYTOLOGIC INTERPRETATION:   · Numerous cores and fragments of benign hepatic parenchyma     10/24/18  Liver, Fine Needle Aspiration CYTOLOGIC INTERPRETATION:   Metastatic adenocarcinoma (see Comment). General Categorization   Positive for malignancy. Comment: The morphologic appearance is nonspecific with regard to site of origin but compatible with metastatic pancreatic carcinoma in this patient with known pancreatic adenocarcinoma ([id number]). 11/9/18-9/25/19- abraxane/gemcitabine -- PD  mFOLFIRINOX 10/16/19-    History of Present Illness:   She started having abdominal pain, gradual and persistent, x 1 month, pressure sensation, located in epigastrium, no radiation, no aggravating symptoms, no relieving factors. 25 lb weight loss over 6 months.  + nausea. Interval history:  In today for follow up and treatment. Complains of gr 2 loss of appetite, gr 1 constipation, gr 1 fatigue, gr 2 hair loss, gr 1 insomnia, gr 1 concentration, gr 1 sob, gr 1 neuropathy, gr 1 urinary leakage. Past Medical History:   Diagnosis Date    Arthritis     Constipation     Diabetes ([address]     Hemorrhoids     Hiatal hernia     High cholesterol     Hypertension     Pancreatic cancer ([address]       Past Surgical History:   Procedure Laterality Date    HX KNEE ARTHROSCOPY Right     HX ORTHOPAEDIC      left wrist surgery    HX TONSILLECTOMY        Social History     Tobacco Use    Smoking status: Never Smoker    Smokeless tobacco: Never Used   Substance Use Topics    Alcohol use:  Yes     Alcohol/week: 2.0 - 4.0 standard drinks     Types: 1 - 2 Cans of beer, 1 - 2 Shots of liquor per week     Frequency: 2-4 times a month     Drinks per session: 1 or 2     Comment: minimal      Family History   Problem Relation Age of Onset    Cancer Mother         skin    Hypertension Mother     Heart Disease Mother     Cancer Father         skin    Heart Disease Father     Stroke Father     Diabetes Neg Hx      Current Outpatient Medications   Medication Sig    hydroCHLOROthiazide (HYDRODIURIL) 12.5 mg tablet Take 0.5 Tabs by mouth daily. Replaces capsules    TRESIBA FLEXTOUCH U-200 200 unit/mL (3 mL) inpn Inject 50 units on chemo days and 10 units as needed for bs > 150 on non-chemo days    glucose blood VI test strips (ACCU-CHEK RICHIE PLUS TEST STRP) strip Accu-Chek Richie Plus test strips   Check BS BID    OTHER Cranial prosthesis    Dx C25.7    omeprazole (PRILOSEC) 20 mg capsule Take 20 mg by mouth daily.  simvastatin (ZOCOR) 10 mg tablet Take 10 mg by mouth nightly.  telmisartan (MICARDIS) 80 mg tablet Take 80 mg by mouth daily.  ondansetron hcl (ZOFRAN) 8 mg tablet Take 1 Tab by mouth every eight (8) hours as needed for Nausea.  lidocaine-prilocaine (EMLA) topical cream Apply  to affected area as needed for Pain.  IBUPROFEN IB PO Take  by mouth as needed. No current facility-administered medications for this visit. Allergies   Allergen Reactions    Amoxicillin Hives        Review of Systems: A comprehensive review of systems was performed and all systems were negative except for HPI and for the symptom review form, reviewed and scanned in.       Physical Exam:     Visit Vitals  /68   Pulse 82   Temp 97.9 °F (36.6 °C) (Temporal)   Resp 18   Ht 5' 7\" (1.702 m)   Wt 177 lb 6.4 oz (80.5 kg)   SpO2 98%   BMI 27.78 kg/m²     ECOG PS: 0  General: No distress  Eyes: PERRLA, anicteric sclerae  HENT: Atraumatic, OP clear  Neck: Supple  Lymphatic: No cervical, supraclavicular, or inguinal adenopathy  Respiratory: CTAB, normal respiratory effort  CV: Normal rate, regular rhythm, no murmurs, 2+ LE edema bilateral LE  GI: Soft, nontender, nondistended, no masses, no hepatomegaly, no splenomegaly  MS: Normal gait and station. Digits without clubbing or cyanosis. Skin: No rashes, ecchymoses, or petechiae. Normal temperature, turgor, and texture. Psych: Alert, oriented, appropriate affect, normal judgment/insight        Results:     Lab Results   Component Value Date/Time    WBC 5.5 10/09/2019 10:47 AM    HGB 8.8 (L) 10/09/2019 10:47 AM    HCT 27.4 (L) 10/09/2019 10:47 AM    PLATELET 829 (H) 29/68/0059 10:47 AM    MCV 94.5 10/09/2019 10:47 AM    ABS. NEUTROPHILS 4.0 10/09/2019 10:47 AM     Lab Results   Component Value Date/Time    Sodium 131 (L) 10/09/2019 10:47 AM    Potassium 3.8 10/09/2019 10:47 AM    Chloride 98 10/09/2019 10:47 AM    CO2 27 10/09/2019 10:47 AM    Glucose 174 (H) 10/09/2019 10:47 AM    BUN 8 10/09/2019 10:47 AM    Creatinine 0.84 10/09/2019 10:47 AM    GFR est AA >60 10/09/2019 10:47 AM    GFR est non-AA >60 10/09/2019 10:47 AM    Calcium 8.2 (L) 10/09/2019 10:47 AM    Glucose (POC) 168 (H) 04/15/2019 10:44 AM     Lab Results   Component Value Date/Time    Bilirubin, total 0.5 10/09/2019 10:47 AM    ALT (SGPT) 15 10/09/2019 10:47 AM    AST (SGOT) 17 10/09/2019 10:47 AM    Alk. phosphatase 129 (H) 10/09/2019 10:47 AM    Protein, total 5.7 (L) 10/09/2019 10:47 AM    Albumin 3.0 (L) 10/09/2019 10:47 AM    Globulin 2.7 10/09/2019 10:47 AM     10/1/18 CT abd  There is a 3.8 x 1.9 cm mass involving the uncinate process and possibly  invading the duodenum. Findings are nonspecific but typical of pancreatic  carcinoma. There is no biliary or pancreatic ductal dilatation.     There is a poorly defined irregular hypodensity in segment IVb of the liver  measuring 3.7 x 1.9 cm. There appears to be focal biliary dilatation in the left  lobe behind this abnormality.  Metastatic disease is suspected. There is a small,  1 cm hypodensity in the dome of the liver, likely segment 8. There is a 1 cm  hypodensity in segment 4 of the liver as well, also likely metastasis. Small  hypodensity measuring less than 1 cm in segment 6 at the tip laterally is also  nonspecific but probable metastasis.     Spleen kidneys and adrenals are unremarkable.     No free fluid or focal fluid collection.     Lung bases are clear.     Bone windows are unremarkable.     IMPRESSION:  1. 3.8 x 1.9 cm mass involving the uncinate process of the pancreas and possibly  invading the duodenum, this likely represents pancreatic carcinoma. 2. Likely metastatic disease in the liver. There is a 3.8 x 1.9 cm mass involving the uncinate process and possibly  invading the duodenum. Findings are nonspecific but typical of pancreatic  carcinoma. There is no biliary or pancreatic ductal dilatation.     There is a poorly defined irregular hypodensity in segment IVb of the liver  measuring 3.7 x 1.9 cm. There appears to be focal biliary dilatation in the left  lobe behind this abnormality. Metastatic disease is suspected. There is a small,  1 cm hypodensity in the dome of the liver, likely segment 8. There is a 1 cm  hypodensity in segment 4 of the liver as well, also likely metastasis. Small  hypodensity measuring less than 1 cm in segment 6 at the tip laterally is also  nonspecific but probable metastasis.     Spleen kidneys and adrenals are unremarkable.     No free fluid or focal fluid collection.     Lung bases are clear.     Bone windows are unremarkable.     IMPRESSION:  1. 3.8 x 1.9 cm mass involving the uncinate process of the pancreas and possibly  invading the duodenum, this likely represents pancreatic carcinoma. 2. Likely metastatic disease in the liver. Records reviewed and summarized above. Pathology report(s) reviewed above. Radiology report(s) reviewed above. MRI abdomen 10/22/18: IMPRESSION:       1.  Pancreatic uncinate process mass suspicious for pancreatic neoplasm, possibly  adenocarcinoma. Mass abuts the superior mesenteric artery with about 20%  circumference involvement but no luminal distortion or perivascular stranding. 2. Multiple hepatic lesions suspicious for metastatic neoplasm with segment IVb  lesion showing patchy areas of surrounding steatosis likely secondary to locally  altered intrahepatic hemodynamics and likely responsible for biopsy result. 3. Cholecystolithiasis and small gallbladder polyp. CT c/a/p 12/28/18: IMPRESSION:  Interval decrease in size of pancreatic mass. Slight interval decrease in size of hepatic metastases; these now demonstrate central low attenuation suggestive of necrosis. No evidence of new metastatic disease in the chest, abdomen, or pelvis. CT c/a/p 2/25/19:  LIVER: The lesions described on the prior examination have improved in the  interval. 19 mm lesion in the dome of the liver now measures 1 cm. 2.0 x 1.5 cm  lesion in the left hepatic lobe now measures 1.3 x 1.2 cm. Other smaller lesions  are no longer visualized. GALLBLADDER: Unremarkable. SPLEEN: No mass. PANCREAS: Mass lesion in the uncinate process now measures 1.7 x 2.0 cm,  previously measuring 2.0 x 3.0 cm. ADRENALS: Unremarkable. KIDNEYS: No mass, calculus, or hydronephrosis. STOMACH: Unremarkable. SMALL BOWEL: No dilatation or wall thickening. COLON: No dilatation or wall thickening. APPENDIX: Unremarkable. PERITONEUM: No ascites or pneumoperitoneum. RETROPERITONEUM: No lymphadenopathy or aortic aneurysm. REPRODUCTIVE ORGANS: Intrauterine device projects in satisfactory position. URINARY BLADDER: No mass or calculus. BONES: Degenerative changes are seen in the thoracic and lumbar spine. ADDITIONAL COMMENTS: N/A     IMPRESSION  IMPRESSION:  Interval decrease in the size of the hepatic metastases.  Decrease in the size of  the mass lesion involving the uncinate process.     RECIST:  Pancreatic uncinate mass: Current: (68) 1.7X2.0cm     12/28/2018: (71) 3.0 x  2.0 cm   Segment 4B liver mass: Current: (56) 1.3 x 1.2 cm 12/28/2018: (57) 2.0 x 1.5 cm   Segment 5 liver mass: Current: (68) 4 x 7 mm 12/28/2018: (70) 1.4 x 0.9 cm    CXR for port study 4/15/19:   IMPRESSION:  Right internal jugular chest Port-A-Cath is seen with the catheter terminating  in the mid SVC. The port is slightly angled medially due to breast tissue. The  port is not flipped. Access needle appears to be within the port hub. Nursing  staff was able to get good blood return and flush the port without difficulty. No intervention was performed. CT a/p 4/22/19:  IMPRESSION:  1. Slight decrease in size of liver metastases, detailed above. 2. Slight decrease in size in uncinate process mass. 3. No evidence for new metastatic disease.     RECIST:  Uncinate process mass: 15 x 9 mm, image 56, previously 17 x 11 mm. Segment IVb metastasis 11 x 9 mm, image 50, previously 13 x 12 mm  Segment 5 metastasis: Barely detectable, image 62    CT c/a/p 6/17/19: IMPRESSION:  1. Stable lesion in the uncinate process. 2. Slight interval decrease in one of the 2 small liver lesions while the other  remains stable. 3. Stable small nodules right upper lobe. 4. No new evidence of metastatic disease or acute abnormality.     RECIST:  Uncinate process mass: 15 x 9 mm, image 68, previously 15 x 9 mm, image 56  Segment IVb metastasis 11 x 9 mm, image 56, previously 11 x 9 mm, image 50  Segment 5 metastasis: No longer visualized, previously barely detectable, image  62    CT c/a/p 8/12/19:  IMPRESSION:  Stable mass lesion in the uncinate process. Stable liver lesions. Stable  subpleural nodules right upper lobe.  No new evidence of metastatic disease or acute abnormality.     RECIST:  Uncinate process mass: 15 x 9 mm, image 69, previously 15 x 9 mm, image 68   Segment IVb metastasis 11 x 10 mm, image 56, previously 11 x 10 mm, image 56   Segment 5 metastasis: Not visualized    CT c/a/p 10/7/19:   IMPRESSION:   1. Uncinate process mass. This is difficult to measure but appears to have  increased in size. A multiphase pancreatic protocol on the next follow-up  examination may be helpful to more precisely define the lesion. 2. Small liver lesions unchanged. 3. No evidence of new metastatic disease. 4. Intrauterine device. RECIST   Malignant neoplasm of other parts of the pancreas.     TARGET LESIONS:      Lesion (description)         Location (series/slice)                Size                                              1. Uncinate process mass    series 3 image 68    2.3 x 2 cm  2. Segment 4B liver lesion    series 3 image 57    10 x 9 mm        NONTARGET LESIONS:   None. Assessment/plan:   1. Uncinate pancreatic adenocarcinoma,  mets to liver, stage IV:  cT2 cN0 pM1    Discussed that while this is treatable, it is not curable, the goal of therapy is palliative. Discussed that without therapy, life expectancy would be 3-6 months, with therapy 12-18 months on average. CT on 10/7/19 with PD > 20% of pancreatic mass. We discussed the risks and benefits of FOLFIRINOX chemotherapy, including potential side effects. These include but are not limited to fatigue, nausea vomiting, diarrhea, neuropathy, taste changes, cold intolerance, esophageal spasm, allergic reactions, alopecia, mucositis, myelosuppression, risk for infection, infertility, and rarely, death. Rarely, a patient may have a condition where they do not metabolize fluorouracil appropriately (called DPD deficiency), and they may have excessive toxicity. A Port-A-Cath will be required in order to deliver the continuous infusion. The patient has consented to beginning therapy.     ·  mFOLFIRINOX (oxaliplatin 85 mg/m2, irinotecan 150mg/m2, leucovorin 400 mg/m2, and a 46 hour infusion of fluorouracil 2400 mg/m2 given every 2 weeks)  · Labs: CBC, BMP, Magnesium prior to each treatment, hepatic function panel every 4 weeks  · Prophylactic antiemetics: Palonosetron and dexamethasone on the day of each chemotherapy infusion. · PRN antiemetics: Ondansetron, Prochlorperazine  · PRN antidiarrheals: Atropine 0.4mg IV every 2 hours PRN during or immediately after irinotecan infusion,  imodium every 2 hours as needed at home  · EMLA cream for port    After this discussion, she is agreeable to mFOLFIRINOX, will start next week. She has signed informed consent. Cycle 1 today. We will plan to see the patient in follow up at least once per cycle, or sooner if symptoms warrant. MSI testing pending. Will send tumor for strata testing. Will also perform genetic testing for BRCA mutations, looking for possible use of PARP inhibitior    2. DM2:  Holding metformin; on insulin; saw Dr. Rosey Frey; her BS are improving; she is now taking less insulin; Hgb A1C 6.8    3. Emotional well being:  She has excellent support and is coping well with her disease    4. Abdominal pain:  Intermittent, may be due to constipation;  palliative care has seen    5. Nutrition:  Madelaine Neville RD has met with her; holding on enzymes. She has lost 2 pounds since last visit. Reports taste changes, decreased appetite. Will have Madelaine Neville, [address] speak with her. 6. Insomnia: Ongoing but no worse. Currently taking melatonin. She has lunesta if needed. 7. Anemia:  Due to chemo and disease; monitor; iron profile and ferritin normal; gave injectafer 750 mg IV on 5/22/19. Hgb 8.8 today. Iron sat 18% and Ferritin 386 on 8/28/19. Will continue to monitor. 8. Neutropenia:  Due to chemo, will monitor now that changing chemo    9. Constipation:  Resolved for now so she is holding off on her stool softener, but she continues with Miralax. 10. Sinus congestion: Intermittent but has improved. Clear/bloody sinus drainage. Recommend OTC sudafed and saline spray. She has seen Dr. Darrel Skelton, ENT, s/p steroids. On flonase.      11. Dehydration: She is drinking more fluids and has not had issues with dizziness. 12. Fatigue: Due to treatment. Ongoing. [facility] information given. Received acupuncture on 8/5/19 and 9/2019. Next scheduled for 10/8/19. S/p 1L IV hydration. Will monitor. 13. LE edema: Does improve with elevation. BLE dopplers on 8/15/19 negative. Restarted HCTZ at 6/25 mg daily. 14. Neuropathy:  Due to chemo; worse in feet; tried cymbalta 30 mg daily, but had insomnia and nausea; acupuncture has helped    > 40 minutes were spent with this patient with > 50% of that time spent in face to face counseling.         Signed By: Víctor Ordoñez MD verbal cues/nonverbal cues (demo/gestures)/1 person assist

## 2021-01-09 NOTE — PROGRESS NOTE ADULT - SUBJECTIVE AND OBJECTIVE BOX
Anesthesia Pain Management Service    SUBJECTIVE: Patient doing well with PCEA and no problems.    Pain Scale Score: 0/10 at rest and 10/10 when coughing  Refer to charted pain scores    THERAPY:  [x ] Epidural Bupivacaine 0.0625% and Hydromorphone  		[ X] 10 micrograms/mL	[ ] 5 micrograms/mL  [ ] Epidural Bupivacaine 0.0625% and Fentanyl - 2 micrograms/mL  [ ] Epidural Ropivacaine 0.1% plain – 1 mg/mL  [ ] Patient Controlled Regional Anesthesia (PCRA) Ropivacaine  		[ ] 0.2%			[ ] 0.1%    Demand dose __3_ lockout __15_ (minutes) Continuous Rate _2__ Total: __100.70__ ml used (in past 24 hours)      MEDICATIONS  (STANDING):  albuterol/ipratropium for Nebulization 3 milliLiter(s) Nebulizer every 6 hours  atorvastatin 20 milliGRAM(s) Oral at bedtime  clonazePAM  Tablet 2 milliGRAM(s) Oral at bedtime  cloZAPine 200 milliGRAM(s) Oral at bedtime  dornase terry Solution 2.5 milliGRAM(s) Inhalation every 12 hours  famotidine    Tablet 20 milliGRAM(s) Oral every 12 hours  heparin   Injectable 5000 Unit(s) SubCutaneous every 8 hours  hydromorphone (10 MICROgram(s)/mL) + bupivacaine 0.0625% in 0.9% Sodium Chloride PCEA 250 milliLiter(s) Epidural PCA Continuous  lactated ringers. 1000 milliLiter(s) (50 mL/Hr) IV Continuous <Continuous>  magnesium sulfate  IVPB 2 Gram(s) IV Intermittent once  PARoxetine 30 milliGRAM(s) Oral daily  phenylephrine    Infusion 0.2 MICROgram(s)/kG/Min (8.03 mL/Hr) IV Continuous <Continuous>  potassium chloride   Powder 40 milliEquivalent(s) Oral once  potassium phosphate IVPB 30 milliMole(s) IV Intermittent once  senna 2 Tablet(s) Oral at bedtime  sodium chloride 3%  Inhalation 4 milliLiter(s) Inhalation every 6 hours    MEDICATIONS  (PRN):  acetaminophen  IVPB .. 1000 milliGRAM(s) IV Intermittent once PRN Temp greater or equal to 38C (100.4F), Mild Pain (1 - 3)  acetaminophen  IVPB .. 1000 milliGRAM(s) IV Intermittent once PRN Temp greater or equal to 38C (100.4F), Mild Pain (1 - 3)  hydromorphone (10 MICROgram(s)/mL) + bupivacaine 0.0625% in 0.9% Sodium Chloride PCEA Rescue Clinician  Bolus 5 milliLiter(s) Epidural every 15 minutes PRN for Pain Score greater than 6  naloxone Injectable 0.1 milliGRAM(s) IV Push every 3 minutes PRN For ANY of the following changes in patient status:  A. RR LESS THAN 10 breaths per minute, B. Oxygen saturation LESS THAN 90%, C. Sedation score of 6  ondansetron Injectable 4 milliGRAM(s) IV Push every 6 hours PRN Nausea      OBJECTIVE:  Patient is sitting up in chair, with CT x2 and valdez.    Assessment of Catheter Site:	[ ] Left	[ ] Right  [x ] Epidural 	[ ] Femoral	      [ ] Saphenous   [ ] Supraclavicular   [ ] Other:    [x ] Dressing intact	[x ] Site non-tender	[ x] Site without erythema, discharge, edema  [x ] Epidural tubing and connection checked	[x] Gross neurological exam within normal limits  [ ] Catheter removed – tip intact		[ ] Afebrile  	[ ] Febrile: ___   [ X] see Temp under VS below)    PT/INR - ( 09 Jan 2021 06:43 )   PT: 12.2 sec;   INR: 1.07 ratio         PTT - ( 09 Jan 2021 06:43 )  PTT:26.5 sec                      10.2   6.22  )-----------( 123      ( 09 Jan 2021 06:43 )             31.2     Vital Signs Last 24 Hrs  T(C): 38.8 (01-09-21 @ 08:00), Max: 38.8 (01-09-21 @ 08:00)  T(F): 101.9 (01-09-21 @ 08:00), Max: 101.9 (01-09-21 @ 08:00)  HR: 87 (01-09-21 @ 08:00) (52 - 96)  BP: 112/94 (01-08-21 @ 18:00) (102/60 - 112/94)  BP(mean): 101 (01-08-21 @ 18:00) (73 - 101)  RR: 16 (01-09-21 @ 08:00) (14 - 23)  SpO2: 99% (01-09-21 @ 08:00) (93% - 100%)      Sedation Score:	[x ] Alert	[ ] Drowsy	[ ] Arousable	[ ] Asleep	[ ] Unresponsive    Side Effects:	[x ] None	[ ] Nausea	[ ] Vomiting	[ ] Pruritus  		[ ] Weakness		[ ] Numbness	[ ] Other:    ASSESSMENT/ PLAN:    Therapy to  be:	[x ] Continue   [ ] Discontinued   [ ] Change to prn Analgesics    Documentation and Verification of current medications:  [ X ] Done	[ ] Not done, not eligible, reason:    Comments:BPs soft. Recommend non-opioid adjuvant analgesics to be used when possible and when allowed by primary surgical team.    Progress Note written now but Patient was seen earlier.

## 2021-01-10 LAB
ANION GAP SERPL CALC-SCNC: 11 MMOL/L — SIGNIFICANT CHANGE UP (ref 7–14)
APPEARANCE UR: ABNORMAL
APTT BLD: 27 SEC — SIGNIFICANT CHANGE UP (ref 27–36.3)
BACTERIA # UR AUTO: NEGATIVE — SIGNIFICANT CHANGE UP
BILIRUB UR-MCNC: NEGATIVE — SIGNIFICANT CHANGE UP
BLD GP AB SCN SERPL QL: NEGATIVE — SIGNIFICANT CHANGE UP
BLOOD GAS ARTERIAL COMPREHENSIVE WITH CREATININE RESULT: SIGNIFICANT CHANGE UP
BUN SERPL-MCNC: 13 MG/DL — SIGNIFICANT CHANGE UP (ref 7–23)
CALCIUM SERPL-MCNC: 7.9 MG/DL — LOW (ref 8.4–10.5)
CHLORIDE SERPL-SCNC: 106 MMOL/L — SIGNIFICANT CHANGE UP (ref 98–107)
CO2 SERPL-SCNC: 23 MMOL/L — SIGNIFICANT CHANGE UP (ref 22–31)
COLOR SPEC: ABNORMAL
CREAT SERPL-MCNC: 1.07 MG/DL — SIGNIFICANT CHANGE UP (ref 0.5–1.3)
DIFF PNL FLD: ABNORMAL
EPI CELLS # UR: 0 /HPF — SIGNIFICANT CHANGE UP (ref 0–5)
GLUCOSE SERPL-MCNC: 99 MG/DL — SIGNIFICANT CHANGE UP (ref 70–99)
GLUCOSE UR QL: NEGATIVE — SIGNIFICANT CHANGE UP
HCT VFR BLD CALC: 28.8 % — LOW (ref 39–50)
HGB BLD-MCNC: 9.6 G/DL — LOW (ref 13–17)
HYALINE CASTS # UR AUTO: 1 /LPF — SIGNIFICANT CHANGE UP (ref 0–7)
INR BLD: 1.13 RATIO — SIGNIFICANT CHANGE UP (ref 0.88–1.16)
KETONES UR-MCNC: ABNORMAL
LEUKOCYTE ESTERASE UR-ACNC: NEGATIVE — SIGNIFICANT CHANGE UP
MAGNESIUM SERPL-MCNC: 2.2 MG/DL — SIGNIFICANT CHANGE UP (ref 1.6–2.6)
MCHC RBC-ENTMCNC: 31 PG — SIGNIFICANT CHANGE UP (ref 27–34)
MCHC RBC-ENTMCNC: 33.3 GM/DL — SIGNIFICANT CHANGE UP (ref 32–36)
MCV RBC AUTO: 92.9 FL — SIGNIFICANT CHANGE UP (ref 80–100)
NITRITE UR-MCNC: NEGATIVE — SIGNIFICANT CHANGE UP
NRBC # BLD: 0 /100 WBCS — SIGNIFICANT CHANGE UP
NRBC # FLD: 0 K/UL — SIGNIFICANT CHANGE UP
PH UR: 6 — SIGNIFICANT CHANGE UP (ref 5–8)
PHOSPHATE SERPL-MCNC: 2.5 MG/DL — SIGNIFICANT CHANGE UP (ref 2.5–4.5)
PLATELET # BLD AUTO: 121 K/UL — LOW (ref 150–400)
POTASSIUM SERPL-MCNC: 3.7 MMOL/L — SIGNIFICANT CHANGE UP (ref 3.5–5.3)
POTASSIUM SERPL-SCNC: 3.7 MMOL/L — SIGNIFICANT CHANGE UP (ref 3.5–5.3)
PROT UR-MCNC: ABNORMAL
PROTHROM AB SERPL-ACNC: 12.9 SEC — SIGNIFICANT CHANGE UP (ref 10.6–13.6)
RBC # BLD: 3.1 M/UL — LOW (ref 4.2–5.8)
RBC # FLD: 14.5 % — SIGNIFICANT CHANGE UP (ref 10.3–14.5)
RBC CASTS # UR COMP ASSIST: 194 /HPF — HIGH (ref 0–4)
RH IG SCN BLD-IMP: POSITIVE — SIGNIFICANT CHANGE UP
SODIUM SERPL-SCNC: 140 MMOL/L — SIGNIFICANT CHANGE UP (ref 135–145)
SP GR SPEC: 1.02 — SIGNIFICANT CHANGE UP (ref 1.01–1.02)
UROBILINOGEN FLD QL: SIGNIFICANT CHANGE UP
WBC # BLD: 4.7 K/UL — SIGNIFICANT CHANGE UP (ref 3.8–10.5)
WBC # FLD AUTO: 4.7 K/UL — SIGNIFICANT CHANGE UP (ref 3.8–10.5)
WBC UR QL: 7 /HPF — SIGNIFICANT CHANGE UP (ref 0–5)

## 2021-01-10 PROCEDURE — 71045 X-RAY EXAM CHEST 1 VIEW: CPT | Mod: 26

## 2021-01-10 PROCEDURE — 99291 CRITICAL CARE FIRST HOUR: CPT

## 2021-01-10 RX ORDER — ROPIVACAINE HCL/PF 5 MG/ML
4 AMPUL (ML) INJECTION
Refills: 0 | Status: DISCONTINUED | OUTPATIENT
Start: 2021-01-10 | End: 2021-01-11

## 2021-01-10 RX ORDER — NALOXONE HYDROCHLORIDE 4 MG/.1ML
0.1 SPRAY NASAL
Refills: 0 | Status: DISCONTINUED | OUTPATIENT
Start: 2021-01-10 | End: 2021-01-15

## 2021-01-10 RX ORDER — POTASSIUM CHLORIDE 20 MEQ
40 PACKET (EA) ORAL ONCE
Refills: 0 | Status: DISCONTINUED | OUTPATIENT
Start: 2021-01-10 | End: 2021-01-10

## 2021-01-10 RX ORDER — PIPERACILLIN AND TAZOBACTAM 4; .5 G/20ML; G/20ML
3.38 INJECTION, POWDER, LYOPHILIZED, FOR SOLUTION INTRAVENOUS ONCE
Refills: 0 | Status: COMPLETED | OUTPATIENT
Start: 2021-01-10 | End: 2021-01-10

## 2021-01-10 RX ORDER — POTASSIUM CHLORIDE 20 MEQ
10 PACKET (EA) ORAL
Refills: 0 | Status: COMPLETED | OUTPATIENT
Start: 2021-01-10 | End: 2021-01-10

## 2021-01-10 RX ORDER — ROPIVACAINE HCL/PF 5 MG/ML
200 AMPUL (ML) INJECTION
Refills: 0 | Status: DISCONTINUED | OUTPATIENT
Start: 2021-01-10 | End: 2021-01-11

## 2021-01-10 RX ORDER — ALBUMIN HUMAN 25 %
250 VIAL (ML) INTRAVENOUS ONCE
Refills: 0 | Status: COMPLETED | OUTPATIENT
Start: 2021-01-10 | End: 2021-01-10

## 2021-01-10 RX ORDER — ONDANSETRON 8 MG/1
4 TABLET, FILM COATED ORAL EVERY 6 HOURS
Refills: 0 | Status: DISCONTINUED | OUTPATIENT
Start: 2021-01-10 | End: 2021-01-15

## 2021-01-10 RX ORDER — PIPERACILLIN AND TAZOBACTAM 4; .5 G/20ML; G/20ML
3.38 INJECTION, POWDER, LYOPHILIZED, FOR SOLUTION INTRAVENOUS EVERY 8 HOURS
Refills: 0 | Status: DISCONTINUED | OUTPATIENT
Start: 2021-01-10 | End: 2021-01-13

## 2021-01-10 RX ORDER — ACETAMINOPHEN 500 MG
1000 TABLET ORAL ONCE
Refills: 0 | Status: COMPLETED | OUTPATIENT
Start: 2021-01-10 | End: 2021-01-10

## 2021-01-10 RX ADMIN — HEPARIN SODIUM 5000 UNIT(S): 5000 INJECTION INTRAVENOUS; SUBCUTANEOUS at 05:47

## 2021-01-10 RX ADMIN — DORNASE ALFA 2.5 MILLIGRAM(S): 1 SOLUTION RESPIRATORY (INHALATION) at 21:37

## 2021-01-10 RX ADMIN — FAMOTIDINE 20 MILLIGRAM(S): 10 INJECTION INTRAVENOUS at 17:57

## 2021-01-10 RX ADMIN — HEPARIN SODIUM 5000 UNIT(S): 5000 INJECTION INTRAVENOUS; SUBCUTANEOUS at 22:04

## 2021-01-10 RX ADMIN — SODIUM CHLORIDE 4 MILLILITER(S): 9 INJECTION INTRAMUSCULAR; INTRAVENOUS; SUBCUTANEOUS at 21:36

## 2021-01-10 RX ADMIN — SODIUM CHLORIDE 50 MILLILITER(S): 9 INJECTION, SOLUTION INTRAVENOUS at 09:15

## 2021-01-10 RX ADMIN — SENNA PLUS 2 TABLET(S): 8.6 TABLET ORAL at 22:04

## 2021-01-10 RX ADMIN — Medication 3 MILLILITER(S): at 10:43

## 2021-01-10 RX ADMIN — Medication 30 MILLIGRAM(S): at 11:47

## 2021-01-10 RX ADMIN — Medication 3 MILLILITER(S): at 21:36

## 2021-01-10 RX ADMIN — Medication 200 MILLILITER(S): at 09:06

## 2021-01-10 RX ADMIN — FAMOTIDINE 20 MILLIGRAM(S): 10 INJECTION INTRAVENOUS at 05:46

## 2021-01-10 RX ADMIN — SODIUM CHLORIDE 4 MILLILITER(S): 9 INJECTION INTRAMUSCULAR; INTRAVENOUS; SUBCUTANEOUS at 16:08

## 2021-01-10 RX ADMIN — CLOZAPINE 200 MILLIGRAM(S): 150 TABLET, ORALLY DISINTEGRATING ORAL at 22:04

## 2021-01-10 RX ADMIN — Medication 3 MILLILITER(S): at 16:08

## 2021-01-10 RX ADMIN — Medication 500 MILLILITER(S): at 06:29

## 2021-01-10 RX ADMIN — Medication 400 MILLIGRAM(S): at 05:45

## 2021-01-10 RX ADMIN — Medication 100 MILLIEQUIVALENT(S): at 16:42

## 2021-01-10 RX ADMIN — PIPERACILLIN AND TAZOBACTAM 25 GRAM(S): 4; .5 INJECTION, POWDER, LYOPHILIZED, FOR SOLUTION INTRAVENOUS at 16:42

## 2021-01-10 RX ADMIN — SODIUM CHLORIDE 4 MILLILITER(S): 9 INJECTION INTRAMUSCULAR; INTRAVENOUS; SUBCUTANEOUS at 10:42

## 2021-01-10 RX ADMIN — DORNASE ALFA 2.5 MILLIGRAM(S): 1 SOLUTION RESPIRATORY (INHALATION) at 10:43

## 2021-01-10 RX ADMIN — Medication 2 MILLIGRAM(S): at 22:04

## 2021-01-10 RX ADMIN — Medication 200 MILLILITER(S): at 19:49

## 2021-01-10 RX ADMIN — Medication 100 MILLIEQUIVALENT(S): at 11:56

## 2021-01-10 RX ADMIN — ATORVASTATIN CALCIUM 20 MILLIGRAM(S): 80 TABLET, FILM COATED ORAL at 22:04

## 2021-01-10 RX ADMIN — PIPERACILLIN AND TAZOBACTAM 25 GRAM(S): 4; .5 INJECTION, POWDER, LYOPHILIZED, FOR SOLUTION INTRAVENOUS at 22:04

## 2021-01-10 RX ADMIN — Medication 100 MILLIEQUIVALENT(S): at 15:03

## 2021-01-10 RX ADMIN — PIPERACILLIN AND TAZOBACTAM 200 GRAM(S): 4; .5 INJECTION, POWDER, LYOPHILIZED, FOR SOLUTION INTRAVENOUS at 06:20

## 2021-01-10 RX ADMIN — HEPARIN SODIUM 5000 UNIT(S): 5000 INJECTION INTRAVENOUS; SUBCUTANEOUS at 15:04

## 2021-01-10 NOTE — PROGRESS NOTE ADULT - SUBJECTIVE AND OBJECTIVE BOX
JAME CHAIDEZ      60y   Male   [id number]         No Known Allergies             Daily     Daily Drug Dosing Weight  Height (cm): 177.8 (2021 07:28)  Weight (kg): 107 (2021 07:28)  BMI (kg/m2): 33.8 (2021 07:28)  BSA (m2): 2.24 (2021 07:28)        59y/o male present for preop eval for scheduled bronchoscopy possible right upper lobectomy.  Pt with c/o h/o recent pneumonia.  Imaging study done Dx with nodule in right upper lung.   (2021 11:31)      Procedure: R thoracotomy RUL lobectomy 2021                         Issues:              Shock - Unspecified  Lung nodule              Postop pain  Schizophrenia  HLD  Hx of ETOH  Hx of smoking               Home Medications:  clonazePAM 2 mg oral tablet: 1 tab(s) orally once a day (at bedtime) (2021 07:44)  Clozaril 100 mg oral tablet: 2 tab(s) orally once a day (in the evening) (2021 07:44)  Paxil 30 mg oral tablet: 2 tab(s) orally once a day (2021 07:44)  rosuvastatin 5 mg oral tablet: 1 tab(s) orally once a day (at bedtime) (2021 07:44)      PAST MEDICAL & SURGICAL HISTORY:  H/O pleural effusion  DX IN  when pt had pneumonia  chronic condition now    Obesity    Migraines    Pneumonia  2011    Smoking hx    H/O ETOH abuse  sober x 20 yrs    Drug abuse, in remission  sober x 20 yrs    HLD (hyperlipidemia)    Schizophrenia    Skin lesion  face   &quot;pre-cancerous&quot;    Cyst  scalp excised 20 yrs ago      Vital Signs Last 24 Hrs  T(C): 38.1 (10 Ronadl 2021 04:00), Max: 38.8 (2021 08:00)  T(F): 100.6 (10 Ronald 2021 04:00), Max: 101.9 (2021 08:00)  HR: 89 (10 Ronald 2021 07:00) (80 - 93)  BP: 103/63 (2021 16:00) (98/76 - 103/63)  BP(mean): 74 (2021 16:00) (74 - 84)  RR: 14 (10 Ronald 2021 07:00) (13 - 21)  SpO2: 94% (10 Ronald 2021 07:00) (92% - 100%)  I&O's Detail    2021 07:01  -  10 Ronald 2021 07:00  --------------------------------------------------------  IN:    Albumin 5%  - 250 mL: 250 mL    IV PiggyBack: 150 mL    IV PiggyBack: 498 mL    Lactated Ringers: 1150 mL    Phenylephrine: 1045.1 mL  Total IN: 3093.1 mL    OUT:    Chest Tube (mL): 495 mL    Chest Tube (mL): 105 mL    Indwelling Catheter - Urethral (mL): 1360 mL  Total OUT: 1960 mL    Total NET: 1133.1 mL        CAPILLARY BLOOD GLUCOSE        CAPILLARY BLOOD GLUCOSE      POCT Blood Glucose.: 112 mg/dL (2021 05:14)      Home Medications:  clonazePAM 2 mg oral tablet: 1 tab(s) orally once a day (at bedtime) (2021 07:44)  Clozaril 100 mg oral tablet: 2 tab(s) orally once a day (in the evening) (2021 07:44)  Paxil 30 mg oral tablet: 2 tab(s) orally once a day (2021 07:44)  rosuvastatin 5 mg oral tablet: 1 tab(s) orally once a day (at bedtime) (2021 07:44)      MEDICATIONS  (STANDING):  acetaminophen  IVPB .. 1000 milliGRAM(s) IV Intermittent once  albuterol/ipratropium for Nebulization 3 milliLiter(s) Nebulizer every 6 hours  atorvastatin 20 milliGRAM(s) Oral at bedtime  clonazePAM  Tablet 2 milliGRAM(s) Oral at bedtime  cloZAPine 200 milliGRAM(s) Oral at bedtime  dornase terry Solution 2.5 milliGRAM(s) Inhalation every 12 hours  famotidine    Tablet 20 milliGRAM(s) Oral every 12 hours  heparin   Injectable 5000 Unit(s) SubCutaneous every 8 hours  hydromorphone (10 MICROgram(s)/mL) + bupivacaine 0.0625% in 0.9% Sodium Chloride PCEA 250 milliLiter(s) Epidural PCA Continuous  lactated ringers. 1000 milliLiter(s) (50 mL/Hr) IV Continuous <Continuous>  PARoxetine 30 milliGRAM(s) Oral daily  phenylephrine    Infusion 0.2 MICROgram(s)/kG/Min (8.03 mL/Hr) IV Continuous <Continuous>  piperacillin/tazobactam IVPB. 3.375 Gram(s) IV Intermittent once  piperacillin/tazobactam IVPB.. 3.375 Gram(s) IV Intermittent every 8 hours  potassium chloride    Tablet ER 40 milliEquivalent(s) Oral once  senna 2 Tablet(s) Oral at bedtime  sodium chloride 3%  Inhalation 4 milliLiter(s) Inhalation every 6 hours    MEDICATIONS  (PRN):  hydromorphone (10 MICROgram(s)/mL) + bupivacaine 0.0625% in 0.9% Sodium Chloride PCEA Rescue Clinician  Bolus 5 milliLiter(s) Epidural every 15 minutes PRN for Pain Score greater than 6  naloxone Injectable 0.1 milliGRAM(s) IV Push every 3 minutes PRN For ANY of the following changes in patient status:  A. RR LESS THAN 10 breaths per minute, B. Oxygen saturation LESS THAN 90%, C. Sedation score of 6  ondansetron Injectable 4 milliGRAM(s) IV Push every 6 hours PRN Nausea          Physical exam:                 General:               Pt is awake, alert, remains extubated,  appears to be comfortable                                                  Neuro:                  Nonfocal                             Cardiovascular:   S1 & S2, regular                          Respiratory:         Air entry is fair and equal on both sides, has bilateral conducted sounds                           GI:                          Soft, nondistended and nontender, Bowel sounds active                            Ext:                        No cyanosis or edema                    Labs:                                                                           9.6    4.70  )-----------( 121      ( 10 Ronald 2021 03:48 )             28.8             01-10    140  |  106  |  13  ----------------------------<  99  3.7   |  23  |  1.07    Ca    7.9<L>      10 Ronald 2021 03:48  Phos  2.5     01-10  Mg     2.2     01-10                    PT/INR - ( 10 Ronald 2021 03:48 )   PT: 12.9 sec;   INR: 1.13 ratio         PTT - ( 10 Ronald 2021 03:48 )  PTT:27.0 sec    Urinalysis Basic - ( 10 Ronald 2021 04:10 )    Color: Light Orange / Appearance: Slightly Turbid / S.024 / pH: x  Gluc: x / Ketone: Moderate  / Bili: Negative / Urobili: <2 mg/dL   Blood: x / Protein: 30 mg/dL / Nitrite: Negative   Leuk Esterase: Negative / RBC: 194 /HPF / WBC 7 /HPF   Sq Epi: x / Non Sq Epi: 0 /HPF / Bacteria: Negative        Culture - Fungal, Tissue (collected 2021 16:42)  Source: .Tissue level 10 node  Preliminary Report (2021 06:35):    Testing in progress    Culture - Acid Fast - Tissue w/Smear (collected 2021 16:42)  Source: .Tissue level 10 node  Preliminary Report (2021 15:04):    Culture is being performed.    Culture - Tissue with Gram Stain (collected 2021 16:42)  Source: .Tissue level 10 node  Gram Stain (2021 19:22):    No polymorphonuclear cells seen per low power field    Rare White blood cells seen per oil power field    No organisms seen per oil power field  Preliminary Report (2021 19:22):    No growth        CXR:    < from: Xray Chest 1 View- PORTABLE-Urgent (Xray Chest 1 View- PORTABLE-Urgent .) (21 @ 13:25) >  INTERPRETATION:  HISTORY:  ADMDIAG1: R91.8 R91.8/; s/p thoracotomy; chest tube waterseal;  TECHNIQUE: Portable frontal view of the chest, 1 view.  COMPARISON 5:26 AM.  FINDINGS/  IMPRESSION:  Study is degraded by motion artifact.. 2 right-sided chest tubes.  HEART:difficult to access in this projection  LUNGS: There is a left basilar infiltrate, and a right parahilar mass. Difficult to evaluate for pneumothorax due to the motion consider repeat film with clinically needed  BONES: degenerative changes      Plan:    General:   60yMale s/p R thoracotomy RUL lobectomy 2021. Postop events significant for reintubation in the O.R. Pt was extubated and seems to be doing OK                             Neuro:                                         Pain control with PCEA /  Tylenol IV PRN    Schizophrenia: Restarted Clonazepam / Clozapine and Paxil                             Cardiovascular:                                          Shock - Unspecified: Continue hemodynamic monitoring and titrate Rock to MAP >65    Received 2 albumins this AM    HLD: May restart Statin after extubation                          Respiratory:                                         Pt remains extubated.                  CXR - some atelectasis of RML                                         Comfortable, not in any distress.                                         Monitor chest tube output                                         Chest tube to suction                                                            Continue bronchodilators, aggressive pulmonary toilet.                             GI                                                                                  Continue Zofran / Reglan for nausea - PRN	                                                                 Renal:                                         Continue LR  30cc/hr                                         Monitor I/Os and electrolytes                                                                                        Hem/ Onc:                                                                                  Monitor chest tube output &  signs of bleeding.                                          Follow CBC in AM                           Infectious disease:                                            Had  fever last night. Cultured and started on Antibiotics.                                            All surgical incision / chest tube  sites look clean                            Endocrine                                            Continue Accu-Checks with coverage    Pt is on SQ Heparin and Venodyne boots for DVT prophylaxis.     Pertinent clinical, laboratory, radiographic, hemodynamic, echocardiographic, respiratory data, microbiologic data and chart were reviewed and analyzed frequently throughout the course of the day and night  Patient seen, examined and plan discussed with CT Surgeon Dr. Merritt  / CTICU team during rounds.    Status discussed with patient /  updated plan of care    I have spent 45 minutes of critical care time with this pt between 7am and 11.59pm             Armando Barajas MD

## 2021-01-10 NOTE — PROGRESS NOTE ADULT - SUBJECTIVE AND OBJECTIVE BOX
Anesthesia Pain Management Service: Day _4_ of Epidural    SUBJECTIVE: Patient doing well with PCEA and no problems.  Pain Scale Score: 0/10 at rest, 10/10 when coughing  Refer to charted pain scores    THERAPY:  [x ] Epidural Bupivacaine 0.0625% and Hydromorphone  		[ X] 10 micrograms/mL	[ ] 5 micrograms/mL  [ ] Epidural Bupivacaine 0.0625% and Fentanyl - 2 micrograms/mL  [ ] Epidural Ropivacaine 0.1% plain – 1 mg/mL  [ ] Patient Controlled Regional Anesthesia (PCRA) Ropivacaine  		[ ] 0.2%			[ ] 0.1%    Demand dose __3_ lockout __15_ (minutes) Continuous Rate _2__ Total: _98.95__ ml used (in past 24 hours)      MEDICATIONS  (STANDING):  acetaminophen  IVPB .. 1000 milliGRAM(s) IV Intermittent once  albuterol/ipratropium for Nebulization 3 milliLiter(s) Nebulizer every 6 hours  atorvastatin 20 milliGRAM(s) Oral at bedtime  clonazePAM  Tablet 2 milliGRAM(s) Oral at bedtime  cloZAPine 200 milliGRAM(s) Oral at bedtime  dornase terry Solution 2.5 milliGRAM(s) Inhalation every 12 hours  famotidine    Tablet 20 milliGRAM(s) Oral every 12 hours  heparin   Injectable 5000 Unit(s) SubCutaneous every 8 hours  lactated ringers. 1000 milliLiter(s) (50 mL/Hr) IV Continuous <Continuous>  PARoxetine 30 milliGRAM(s) Oral daily  phenylephrine    Infusion 0.2 MICROgram(s)/kG/Min (8.03 mL/Hr) IV Continuous <Continuous>  piperacillin/tazobactam IVPB.. 3.375 Gram(s) IV Intermittent every 8 hours  potassium chloride  10 mEq/100 mL IVPB 10 milliEquivalent(s) IV Intermittent every 1 hour  ropivacaine 0.2% in 0.9% Sodium Chloride PCEA 200 milliLiter(s) Epidural PCA Continuous  senna 2 Tablet(s) Oral at bedtime  sodium chloride 3%  Inhalation 4 milliLiter(s) Inhalation every 6 hours    MEDICATIONS  (PRN):  naloxone Injectable 0.1 milliGRAM(s) IV Push every 3 minutes PRN For ANY of the following changes in patient status:  A. RR LESS THAN 10 breaths per minute, B. Oxygen saturation LESS THAN 90%, C. Sedation score of 6  ondansetron Injectable 4 milliGRAM(s) IV Push every 6 hours PRN Nausea  ropivacaine 0.2% in 0.9% Sodium Chloride PCEA Rescue Clinician Bolus 4 milliLiter(s) Epidural every 15 minutes PRN for Pain Score greater than 6      OBJECTIVE:  Patient is sitting up in chair.    Assessment of Catheter Site:	[ ] Left	[ ] Right  [x ] Epidural 	[ ] Femoral	      [ ] Saphenous   [ ] Supraclavicular   [ ] Other:    [x ] Dressing intact	[x ] Site non-tender	[ x] Site without erythema, discharge, edema  [x ] Epidural tubing and connection checked	[x] Gross neurological exam within normal limits  [ ] Catheter removed – tip intact		[ ] Afebrile  	[ ] Febrile: ___   [ X] see Temp under VS below)    PT/INR - ( 10 Ronald 2021 03:48 )   PT: 12.9 sec;   INR: 1.13 ratio         PTT - ( 10 Ronald 2021 03:48 )  PTT:27.0 sec                      9.6    4.70  )-----------( 121      ( 10 Ronald 2021 03:48 )             28.8     Vital Signs Last 24 Hrs  T(C): 38.1 (01-10-21 @ 04:00), Max: 38.1 (01-09-21 @ 18:00)  T(F): 100.6 (01-10-21 @ 04:00), Max: 100.6 (01-09-21 @ 18:00)  HR: 89 (01-10-21 @ 07:00) (80 - 93)  BP: 103/63 (01-09-21 @ 16:00) (98/76 - 103/63)  BP(mean): 74 (01-09-21 @ 16:00) (74 - 84)  RR: 14 (01-10-21 @ 07:00) (13 - 21)  SpO2: 94% (01-10-21 @ 07:00) (92% - 100%)      Sedation Score:	[x ] Alert	[ ] Drowsy	[ ] Arousable	[ ] Asleep	[ ] Unresponsive    Side Effects:	[x ] None	[ ] Nausea	[ ] Vomiting	[ ] Pruritus  		[ ] Weakness		[ ] Numbness	[ ] Other:    ASSESSMENT/ PLAN:    Therapy to  be:	[x ] Continue   [ ] Discontinued   [ ] Change to prn Analgesics    Documentation and Verification of current medications:  [ X ] Done	[ ] Not done, not eligible, reason:    Comments: Discussed patient with team and since SBP still soft and patient is on Phenyephrine, will transition patient's PCEA to Ropivacaine only.  Recommend non-opioid adjuvant analgesics to be used when possible and when allowed by primary surgical team.

## 2021-01-11 LAB
ANION GAP SERPL CALC-SCNC: 9 MMOL/L — SIGNIFICANT CHANGE UP (ref 7–14)
APTT BLD: 25.1 SEC — LOW (ref 27–36.3)
BUN SERPL-MCNC: 11 MG/DL — SIGNIFICANT CHANGE UP (ref 7–23)
CALCIUM SERPL-MCNC: 8.7 MG/DL — SIGNIFICANT CHANGE UP (ref 8.4–10.5)
CHLORIDE SERPL-SCNC: 107 MMOL/L — SIGNIFICANT CHANGE UP (ref 98–107)
CO2 SERPL-SCNC: 26 MMOL/L — SIGNIFICANT CHANGE UP (ref 22–31)
CREAT SERPL-MCNC: 1.15 MG/DL — SIGNIFICANT CHANGE UP (ref 0.5–1.3)
GLUCOSE SERPL-MCNC: 124 MG/DL — HIGH (ref 70–99)
HCT VFR BLD CALC: 29 % — LOW (ref 39–50)
HGB BLD-MCNC: 9.8 G/DL — LOW (ref 13–17)
INR BLD: 1.12 RATIO — SIGNIFICANT CHANGE UP (ref 0.88–1.16)
MAGNESIUM SERPL-MCNC: 2.2 MG/DL — SIGNIFICANT CHANGE UP (ref 1.6–2.6)
MCHC RBC-ENTMCNC: 30.6 PG — SIGNIFICANT CHANGE UP (ref 27–34)
MCHC RBC-ENTMCNC: 33.8 GM/DL — SIGNIFICANT CHANGE UP (ref 32–36)
MCV RBC AUTO: 90.6 FL — SIGNIFICANT CHANGE UP (ref 80–100)
NRBC # BLD: 0 /100 WBCS — SIGNIFICANT CHANGE UP
NRBC # FLD: 0 K/UL — SIGNIFICANT CHANGE UP
PLATELET # BLD AUTO: 133 K/UL — LOW (ref 150–400)
POTASSIUM SERPL-MCNC: 4 MMOL/L — SIGNIFICANT CHANGE UP (ref 3.5–5.3)
POTASSIUM SERPL-SCNC: 4 MMOL/L — SIGNIFICANT CHANGE UP (ref 3.5–5.3)
PROTHROM AB SERPL-ACNC: 12.7 SEC — SIGNIFICANT CHANGE UP (ref 10.6–13.6)
RBC # BLD: 3.2 M/UL — LOW (ref 4.2–5.8)
RBC # FLD: 13.8 % — SIGNIFICANT CHANGE UP (ref 10.3–14.5)
SODIUM SERPL-SCNC: 142 MMOL/L — SIGNIFICANT CHANGE UP (ref 135–145)
WBC # BLD: 4.57 K/UL — SIGNIFICANT CHANGE UP (ref 3.8–10.5)
WBC # FLD AUTO: 4.57 K/UL — SIGNIFICANT CHANGE UP (ref 3.8–10.5)

## 2021-01-11 PROCEDURE — 71045 X-RAY EXAM CHEST 1 VIEW: CPT | Mod: 26

## 2021-01-11 PROCEDURE — 99233 SBSQ HOSP IP/OBS HIGH 50: CPT

## 2021-01-11 PROCEDURE — 71045 X-RAY EXAM CHEST 1 VIEW: CPT | Mod: 26,77,76

## 2021-01-11 RX ORDER — OXYCODONE HYDROCHLORIDE 5 MG/1
5 TABLET ORAL
Refills: 0 | Status: DISCONTINUED | OUTPATIENT
Start: 2021-01-11 | End: 2021-01-15

## 2021-01-11 RX ORDER — ACETAMINOPHEN 500 MG
650 TABLET ORAL EVERY 6 HOURS
Refills: 0 | Status: COMPLETED | OUTPATIENT
Start: 2021-01-11 | End: 2021-01-13

## 2021-01-11 RX ORDER — HEPARIN SODIUM 5000 [USP'U]/ML
5000 INJECTION INTRAVENOUS; SUBCUTANEOUS EVERY 8 HOURS
Refills: 0 | Status: DISCONTINUED | OUTPATIENT
Start: 2021-01-11 | End: 2021-01-12

## 2021-01-11 RX ADMIN — DORNASE ALFA 2.5 MILLIGRAM(S): 1 SOLUTION RESPIRATORY (INHALATION) at 10:59

## 2021-01-11 RX ADMIN — DORNASE ALFA 2.5 MILLIGRAM(S): 1 SOLUTION RESPIRATORY (INHALATION) at 20:54

## 2021-01-11 RX ADMIN — HEPARIN SODIUM 5000 UNIT(S): 5000 INJECTION INTRAVENOUS; SUBCUTANEOUS at 06:38

## 2021-01-11 RX ADMIN — Medication 650 MILLIGRAM(S): at 17:09

## 2021-01-11 RX ADMIN — Medication 30 MILLIGRAM(S): at 12:49

## 2021-01-11 RX ADMIN — FAMOTIDINE 20 MILLIGRAM(S): 10 INJECTION INTRAVENOUS at 06:38

## 2021-01-11 RX ADMIN — PIPERACILLIN AND TAZOBACTAM 25 GRAM(S): 4; .5 INJECTION, POWDER, LYOPHILIZED, FOR SOLUTION INTRAVENOUS at 21:57

## 2021-01-11 RX ADMIN — Medication 3 MILLILITER(S): at 20:54

## 2021-01-11 RX ADMIN — Medication 3 MILLILITER(S): at 10:55

## 2021-01-11 RX ADMIN — SENNA PLUS 2 TABLET(S): 8.6 TABLET ORAL at 21:58

## 2021-01-11 RX ADMIN — Medication 650 MILLIGRAM(S): at 12:49

## 2021-01-11 RX ADMIN — HEPARIN SODIUM 5000 UNIT(S): 5000 INJECTION INTRAVENOUS; SUBCUTANEOUS at 17:05

## 2021-01-11 RX ADMIN — FAMOTIDINE 20 MILLIGRAM(S): 10 INJECTION INTRAVENOUS at 17:09

## 2021-01-11 RX ADMIN — Medication 200 MILLILITER(S): at 07:56

## 2021-01-11 RX ADMIN — Medication 2 MILLIGRAM(S): at 21:57

## 2021-01-11 RX ADMIN — SODIUM CHLORIDE 4 MILLILITER(S): 9 INJECTION INTRAMUSCULAR; INTRAVENOUS; SUBCUTANEOUS at 20:54

## 2021-01-11 RX ADMIN — SODIUM CHLORIDE 4 MILLILITER(S): 9 INJECTION INTRAMUSCULAR; INTRAVENOUS; SUBCUTANEOUS at 10:55

## 2021-01-11 RX ADMIN — PIPERACILLIN AND TAZOBACTAM 25 GRAM(S): 4; .5 INJECTION, POWDER, LYOPHILIZED, FOR SOLUTION INTRAVENOUS at 06:38

## 2021-01-11 RX ADMIN — CLOZAPINE 200 MILLIGRAM(S): 150 TABLET, ORALLY DISINTEGRATING ORAL at 21:58

## 2021-01-11 RX ADMIN — ATORVASTATIN CALCIUM 20 MILLIGRAM(S): 80 TABLET, FILM COATED ORAL at 21:58

## 2021-01-11 RX ADMIN — PIPERACILLIN AND TAZOBACTAM 25 GRAM(S): 4; .5 INJECTION, POWDER, LYOPHILIZED, FOR SOLUTION INTRAVENOUS at 14:01

## 2021-01-11 NOTE — PROGRESS NOTE ADULT - SUBJECTIVE AND OBJECTIVE BOX
JAME CHAIDEZ                     [id number]    HPI:  59y/o male present for preop eval for scheduled bronchoscopy possible right upper lobectomy.  Pt with c/o h/o recent pneumonia.  Imaging study done Dx with nodule in right upper lung.   (2021 11:31)        Procedure: R thoracotomy RUL lobectomy 2021                         Issues:              Shock - Unspecified  Lung nodule              Postop pain  Schizophrenia  HLD  Hx of ETOH  Hx of smoking               Home Medications:  clonazePAM 2 mg oral tablet: 1 tab(s) orally once a day (at bedtime) (2021 07:44)  Clozaril 100 mg oral tablet: 2 tab(s) orally once a day (in the evening) (2021 07:44)  Paxil 30 mg oral tablet: 2 tab(s) orally once a day (2021 07:44)  rosuvastatin 5 mg oral tablet: 1 tab(s) orally once a day (at bedtime) (2021 07:44)      PAST MEDICAL & SURGICAL HISTORY:  H/O pleural effusion  DX IN  when pt had pneumonia  chronic condition now    Obesity    Migraines    Pneumonia  2011    Smoking hx    H/O ETOH abuse  sober x 20 yrs    Drug abuse, in remission  sober x 20 yrs    HLD (hyperlipidemia)    Schizophrenia    Skin lesion  face   &quot;pre-cancerous&quot;    Cyst  scalp excised 20 yrs ago              VITAL SIGNS:  Vital Signs Last 24 Hrs  T(C): 36.8 (2021 08:00), Max: 37.9 (10 Ronald 2021 16:00)  T(F): 98.3 (2021 08:00), Max: 100.2 (10 Ronald 2021 16:00)  HR: 90 (2021 08:00) (79 - 101)  BP: --  BP(mean): --  RR: 23 (2021 08:00) (14 - 27)  SpO2: 99% (2021 08:00) (96% - 100%)    I/Os:   I&O's Detail    10 Ronald 2021 07:01  -  2021 07:00  --------------------------------------------------------  IN:    IV PiggyBack: 400 mL    Lactated Ringers: 1000 mL    Oral Fluid: 240 mL    Phenylephrine: 292 mL  Total IN: 1932 mL    OUT:    Chest Tube (mL): 220 mL    Chest Tube (mL): 30 mL    Indwelling Catheter - Urethral (mL): 2640 mL  Total OUT: 2890 mL    Total NET: -958 mL          CAPILLARY BLOOD GLUCOSE          =======================MEDICATIONS===================  MEDICATIONS  (STANDING):  acetaminophen  IVPB .. 1000 milliGRAM(s) IV Intermittent once  albuterol/ipratropium for Nebulization 3 milliLiter(s) Nebulizer every 6 hours  atorvastatin 20 milliGRAM(s) Oral at bedtime  clonazePAM  Tablet 2 milliGRAM(s) Oral at bedtime  cloZAPine 200 milliGRAM(s) Oral at bedtime  dornase terry Solution 2.5 milliGRAM(s) Inhalation every 12 hours  famotidine    Tablet 20 milliGRAM(s) Oral every 12 hours  lactated ringers. 1000 milliLiter(s) (50 mL/Hr) IV Continuous <Continuous>  PARoxetine 30 milliGRAM(s) Oral daily  phenylephrine    Infusion 0.2 MICROgram(s)/kG/Min (8.03 mL/Hr) IV Continuous <Continuous>  piperacillin/tazobactam IVPB.. 3.375 Gram(s) IV Intermittent every 8 hours  ropivacaine 0.2% in 0.9% Sodium Chloride PCEA 200 milliLiter(s) Epidural PCA Continuous  senna 2 Tablet(s) Oral at bedtime  sodium chloride 3%  Inhalation 4 milliLiter(s) Inhalation every 6 hours    MEDICATIONS  (PRN):  naloxone Injectable 0.1 milliGRAM(s) IV Push every 3 minutes PRN For ANY of the following changes in patient status:  A. RR LESS THAN 10 breaths per minute, B. Oxygen saturation LESS THAN 90%, C. Sedation score of 6  ondansetron Injectable 4 milliGRAM(s) IV Push every 6 hours PRN Nausea  ropivacaine 0.2% in 0.9% Sodium Chloride PCEA Rescue Clinician Bolus 4 milliLiter(s) Epidural every 15 minutes PRN for Pain Score greater than 6        PHYSICAL EXAM============================  General:                         Awake, alert, not in any distress  Neuro:                            Moving all extremities to commands.   Respiratory:	Air entry fair and  bilateral conducted sounds                                           Effort even and unlabored.  CV:		Regular rate and rhythm. Normal S1/S2                                          Distal pulses present.  Abdomen:	                     Soft, non-distended. Bowel sounds present   Skin:		No rash.  Extremities:	Warm, no cyanosis or edema.  Palpable pulses    ============================LABS=========================                        9.8    4.57  )-----------( 133      ( 2021 05:03 )             29.0     01-11    142  |  107  |  11  ----------------------------<  124<H>  4.0   |  26  |  1.15    Ca    8.7      2021 05:03  Phos  2.5     01-10  Mg     2.2     -11        PT/INR - ( 2021 05:03 )   PT: 12.7 sec;   INR: 1.12 ratio         PTT - ( 2021 05:03 )  PTT:25.1 sec  ABG - ( 10 Ronald 2021 03:48 )  pH, Arterial: 7.40  pH, Blood: x     /  pCO2: 41    /  pO2: 77    / HCO3: 25    / Base Excess: 0.2   /  SaO2: 95.9              Urinalysis Basic - ( 10 Ronald 2021 04:10 )    Color: Light Orange / Appearance: Slightly Turbid / S.024 / pH: x  Gluc: x / Ketone: Moderate  / Bili: Negative / Urobili: <2 mg/dL   Blood: x / Protein: 30 mg/dL / Nitrite: Negative   Leuk Esterase: Negative / RBC: 194 /HPF / WBC 7 /HPF   Sq Epi: x / Non Sq Epi: 0 /HPF / Bacteria: Negative        ============================IMAGING STUDIES=========================    < from: Xray Chest 1 View- PORTABLE-Routine (21 @ 05:07) >  INTERPRETATION:    Chest tube unchanged. Improved aeration of the right lung but residual loculated effusion with underlying atelectasis seen. Left lung is clear. Heart size is stable. No pneumothorax.      COMPARISON:  January 10      IMPRESSION:  Status post right thoracotomy with chest tube and improved aeration.    A/P:  60yMale s/p R thoracotomy RUL lobectomy 2021. Postop events significant for reintubation in the O.R. Pt was extubated and seems to be doing OK                             Neuro:                                         Pain control with PCEA /  Tylenol IV PRN    Schizophrenia: Restarted Clonazepam / Clozapine and Paxil                             Cardiovascular:                                          Shock - Unspecified: Continue hemodynamic monitoring and titrate Rock to MAP >65  weaned off Rock    HLD: May restart Statin after extubation                            Respiratory:                                         Pt remains extubated.                  CXR - some atelectasis of RML                                         Comfortable, not in any distress.                                         Monitor chest tube output                                         Chest tube to water seal                                                          Continue bronchodilators, aggressive pulmonary toilet.                             GI                                                                                  Continue Zofran / Reglan for nausea - PRN	                                                                 Renal:                                         Continue LR                                           Monitor I/Os and electrolytes                                                                                        Hem/ Onc:                                                                                  Monitor chest tube output &  signs of bleeding.                                          Follow CBC in AM                           Infectious disease:                                            Had  fever last night. Cultured and started on Antibiotics.                                            All surgical incision / chest tube  sites look clean                            Endocrine                                            Continue Accu-Checks with coverage    Pt is on SQ Heparin and Venodyne boots for DVT prophylaxis.     Pertinent clinical, laboratory, radiographic, hemodynamic, echocardiographic, respiratory data, microbiologic data and chart were reviewed and analyzed frequently throughout the course of the day and night  Patient seen, examined and plan discussed with CT Surgeon Dr. Merritt  / CTICU team during rounds.    Status discussed with patient /  updated plan of care    I have spent 45 minutes of critical care time with this pt between 7am and 11.59pm               Florina Shipman DO, FACEP

## 2021-01-11 NOTE — PROGRESS NOTE ADULT - SUBJECTIVE AND OBJECTIVE BOX
Anesthesia Pain Management Service: Day _5_ of Epidural    SUBJECTIVE: Patient doing well with PCEA and no problems.  Pain Scale Score:  1/10  Refer to charted pain scores    THERAPY:  [  ] Epidural Bupivacaine 0.0625% and Hydromorphone  		[X ] 10 micrograms/mL	[ ] 5 micrograms/mL  [ ] Epidural Bupivacaine 0.0625% and Fentanyl - 2 micrograms/mL  [ ] Epidural Ropivacaine 0.1% plain – 1 mg/mL  [ ] Patient Controlled Regional Anesthesia (PCRA) Ropivacaine  		[ x] 0.2%			[ ] 0.1%    Demand dose __3_ lockout __15_ (minutes) Continuous Rate _4__ Total: _119.35ml__ Daily      MEDICATIONS  (STANDING):  acetaminophen    Suspension .. 650 milliGRAM(s) Oral every 6 hours  acetaminophen  IVPB .. 1000 milliGRAM(s) IV Intermittent once  albuterol/ipratropium for Nebulization 3 milliLiter(s) Nebulizer every 6 hours  atorvastatin 20 milliGRAM(s) Oral at bedtime  clonazePAM  Tablet 2 milliGRAM(s) Oral at bedtime  cloZAPine 200 milliGRAM(s) Oral at bedtime  dornase terry Solution 2.5 milliGRAM(s) Inhalation every 12 hours  famotidine    Tablet 20 milliGRAM(s) Oral every 12 hours  lactated ringers. 1000 milliLiter(s) (50 mL/Hr) IV Continuous <Continuous>  PARoxetine 30 milliGRAM(s) Oral daily  phenylephrine    Infusion 0.2 MICROgram(s)/kG/Min (8.03 mL/Hr) IV Continuous <Continuous>  piperacillin/tazobactam IVPB.. 3.375 Gram(s) IV Intermittent every 8 hours  senna 2 Tablet(s) Oral at bedtime  sodium chloride 3%  Inhalation 4 milliLiter(s) Inhalation every 6 hours    MEDICATIONS  (PRN):  naloxone Injectable 0.1 milliGRAM(s) IV Push every 3 minutes PRN For ANY of the following changes in patient status:  A. RR LESS THAN 10 breaths per minute, B. Oxygen saturation LESS THAN 90%, C. Sedation score of 6  ondansetron Injectable 4 milliGRAM(s) IV Push every 6 hours PRN Nausea  oxyCODONE    Solution 5 milliGRAM(s) Oral every 3 hours PRN Moderate to Severe Pain (4 - 6)      OBJECTIVE:  Patient is sitting up in chair with CT x1.    Assessment of Catheter Site:	[ ] Left	[ ] Right  [x ] Epidural 	[ ] Femoral	      [ ] Saphenous   [ ] Supraclavicular   [ ] Other:    [x ] Dressing intact	[x ] Site non-tender	[ x] Site without erythema, discharge, edema  [x ] Epidural tubing and connection checked	[x] Gross neurological exam within normal limits  [X ] Catheter removed – tip intact		[ ] Afebrile	  [ ] Febrile: ___       [ X] see Temp under VS below)    PT/INR - ( 11 Jan 2021 05:03 )   PT: 12.7 sec;   INR: 1.12 ratio         PTT - ( 11 Jan 2021 05:03 )  PTT:25.1 sec                      9.8    4.57  )-----------( 133      ( 11 Jan 2021 05:03 )             29.0     Vital Signs Last 24 Hrs  T(C): 36.8 (01-11-21 @ 08:00), Max: 37.9 (01-10-21 @ 16:00)  T(F): 98.3 (01-11-21 @ 08:00), Max: 100.2 (01-10-21 @ 16:00)  HR: 98 (01-11-21 @ 11:00) (86 - 107)  BP: 117/62 (01-11-21 @ 11:00) (117/62 - 117/62)  BP(mean): 79 (01-11-21 @ 11:00) (79 - 79)  RR: 21 (01-11-21 @ 11:00) (19 - 27)  SpO2: 96% (01-11-21 @ 11:00) (96% - 100%)      Sedation Score:	[x ] Alert	[ ] Drowsy	[ ] Arousable	[ ] Asleep	[ ] Unresponsive    Side Effects:	[x ] None	[ ] Nausea	[ ] Vomiting	[ ] Pruritus  		[ ] Weakness		[ ] Numbness	[ ] Other:    ASSESSMENT/ PLAN:    Therapy to  be:	[ ] Continue   [ X] Discontinued   [ X] Change to prn Analgesics    Documentation and Verification of current medications:  [ X ] Done	[ ] Not done, not eligible, reason:    Comments: PCEA discontinued and changed to IV/oral opioid and/or non-opioid Adjuvant analgesics to be used at this point.

## 2021-01-11 NOTE — PROGRESS NOTE ADULT - SUBJECTIVE AND OBJECTIVE BOX
Day _4__ of Anesthesia Pain Management Service    SUBJECTIVE:    Therapy:	  [ ] IV PCA	   [x ] Epidural           [ ] s/p Spinal Opoid              [ ] Postpartum infusion	  [ ] Patient controlled regional anesthesia (PCRA)    [ ] prn Analgesics    OBJECTIVE:   [x ] No new signs     [ ] Other:    Side Effects:  [ x] None			[ ] Other:    Assessment of Catheter Site:		[ x] Intact		[ ] Other:    ASSESSMENT/PLAN  [ ] Continue current therapy    [x ] Therapy changed to:    [ ] IV PCA       [ ] Epidural     [ x] prn Analgesics     Comments:

## 2021-01-11 NOTE — DIETITIAN INITIAL EVALUATION ADULT. - OTHER INFO
Pt. presented with missing dentition and placed on pureed consistency diet met with pt. and primary nurse and confirmed pt. taking > 75% of the meals. Wt. hx from [facility] indicates wt. up /down and up ( was 104.8 kg in Feb. 2019 ; 81.6 kg in Dec. 2020 & 106.6 kg on 1/4/2021)

## 2021-01-11 NOTE — DIETITIAN INITIAL EVALUATION ADULT. - ORAL INTAKE PTA/DIET HISTORY
Pt. reports having good appetite & PO nutrition prior to admission, no known food allergies, no issues chewing , swallowing . Pt. reports no recent wt. changes .

## 2021-01-12 LAB
ANION GAP SERPL CALC-SCNC: 13 MMOL/L — SIGNIFICANT CHANGE UP (ref 7–14)
BUN SERPL-MCNC: 13 MG/DL — SIGNIFICANT CHANGE UP (ref 7–23)
CALCIUM SERPL-MCNC: 8.7 MG/DL — SIGNIFICANT CHANGE UP (ref 8.4–10.5)
CHLORIDE SERPL-SCNC: 106 MMOL/L — SIGNIFICANT CHANGE UP (ref 98–107)
CO2 SERPL-SCNC: 23 MMOL/L — SIGNIFICANT CHANGE UP (ref 22–31)
CREAT SERPL-MCNC: 1.16 MG/DL — SIGNIFICANT CHANGE UP (ref 0.5–1.3)
CULTURE RESULTS: SIGNIFICANT CHANGE UP
GLUCOSE SERPL-MCNC: 123 MG/DL — HIGH (ref 70–99)
HCT VFR BLD CALC: 30.9 % — LOW (ref 39–50)
HGB BLD-MCNC: 10.1 G/DL — LOW (ref 13–17)
MCHC RBC-ENTMCNC: 30.6 PG — SIGNIFICANT CHANGE UP (ref 27–34)
MCHC RBC-ENTMCNC: 32.7 GM/DL — SIGNIFICANT CHANGE UP (ref 32–36)
MCV RBC AUTO: 93.6 FL — SIGNIFICANT CHANGE UP (ref 80–100)
NRBC # BLD: 0 /100 WBCS — SIGNIFICANT CHANGE UP
NRBC # FLD: 0 K/UL — SIGNIFICANT CHANGE UP
PLATELET # BLD AUTO: 148 K/UL — LOW (ref 150–400)
POTASSIUM SERPL-MCNC: 3.9 MMOL/L — SIGNIFICANT CHANGE UP (ref 3.5–5.3)
POTASSIUM SERPL-SCNC: 3.9 MMOL/L — SIGNIFICANT CHANGE UP (ref 3.5–5.3)
RBC # BLD: 3.3 M/UL — LOW (ref 4.2–5.8)
RBC # FLD: 13.7 % — SIGNIFICANT CHANGE UP (ref 10.3–14.5)
SODIUM SERPL-SCNC: 142 MMOL/L — SIGNIFICANT CHANGE UP (ref 135–145)
SPECIMEN SOURCE: SIGNIFICANT CHANGE UP
SURGICAL PATHOLOGY STUDY: SIGNIFICANT CHANGE UP
WBC # BLD: 4.14 K/UL — SIGNIFICANT CHANGE UP (ref 3.8–10.5)
WBC # FLD AUTO: 4.14 K/UL — SIGNIFICANT CHANGE UP (ref 3.8–10.5)

## 2021-01-12 PROCEDURE — 71045 X-RAY EXAM CHEST 1 VIEW: CPT | Mod: 26

## 2021-01-12 PROCEDURE — 99233 SBSQ HOSP IP/OBS HIGH 50: CPT

## 2021-01-12 RX ORDER — HEPARIN SODIUM 5000 [USP'U]/ML
7500 INJECTION INTRAVENOUS; SUBCUTANEOUS EVERY 8 HOURS
Refills: 0 | Status: DISCONTINUED | OUTPATIENT
Start: 2021-01-12 | End: 2021-01-19

## 2021-01-12 RX ORDER — POTASSIUM CHLORIDE 20 MEQ
20 PACKET (EA) ORAL ONCE
Refills: 0 | Status: COMPLETED | OUTPATIENT
Start: 2021-01-12 | End: 2021-01-12

## 2021-01-12 RX ADMIN — Medication 20 MILLIEQUIVALENT(S): at 07:08

## 2021-01-12 RX ADMIN — PIPERACILLIN AND TAZOBACTAM 25 GRAM(S): 4; .5 INJECTION, POWDER, LYOPHILIZED, FOR SOLUTION INTRAVENOUS at 21:51

## 2021-01-12 RX ADMIN — DORNASE ALFA 2.5 MILLIGRAM(S): 1 SOLUTION RESPIRATORY (INHALATION) at 11:14

## 2021-01-12 RX ADMIN — HEPARIN SODIUM 5000 UNIT(S): 5000 INJECTION INTRAVENOUS; SUBCUTANEOUS at 07:46

## 2021-01-12 RX ADMIN — HEPARIN SODIUM 7500 UNIT(S): 5000 INJECTION INTRAVENOUS; SUBCUTANEOUS at 21:51

## 2021-01-12 RX ADMIN — FAMOTIDINE 20 MILLIGRAM(S): 10 INJECTION INTRAVENOUS at 05:15

## 2021-01-12 RX ADMIN — HEPARIN SODIUM 5000 UNIT(S): 5000 INJECTION INTRAVENOUS; SUBCUTANEOUS at 01:14

## 2021-01-12 RX ADMIN — Medication 3 MILLILITER(S): at 16:01

## 2021-01-12 RX ADMIN — Medication 650 MILLIGRAM(S): at 01:14

## 2021-01-12 RX ADMIN — PIPERACILLIN AND TAZOBACTAM 25 GRAM(S): 4; .5 INJECTION, POWDER, LYOPHILIZED, FOR SOLUTION INTRAVENOUS at 14:04

## 2021-01-12 RX ADMIN — SENNA PLUS 2 TABLET(S): 8.6 TABLET ORAL at 21:50

## 2021-01-12 RX ADMIN — SODIUM CHLORIDE 4 MILLILITER(S): 9 INJECTION INTRAMUSCULAR; INTRAVENOUS; SUBCUTANEOUS at 17:03

## 2021-01-12 RX ADMIN — SODIUM CHLORIDE 4 MILLILITER(S): 9 INJECTION INTRAMUSCULAR; INTRAVENOUS; SUBCUTANEOUS at 22:07

## 2021-01-12 RX ADMIN — CLOZAPINE 200 MILLIGRAM(S): 150 TABLET, ORALLY DISINTEGRATING ORAL at 21:50

## 2021-01-12 RX ADMIN — OXYCODONE HYDROCHLORIDE 5 MILLIGRAM(S): 5 TABLET ORAL at 16:52

## 2021-01-12 RX ADMIN — Medication 650 MILLIGRAM(S): at 05:15

## 2021-01-12 RX ADMIN — OXYCODONE HYDROCHLORIDE 5 MILLIGRAM(S): 5 TABLET ORAL at 23:36

## 2021-01-12 RX ADMIN — Medication 650 MILLIGRAM(S): at 21:50

## 2021-01-12 RX ADMIN — SODIUM CHLORIDE 4 MILLILITER(S): 9 INJECTION INTRAMUSCULAR; INTRAVENOUS; SUBCUTANEOUS at 11:16

## 2021-01-12 RX ADMIN — Medication 2 MILLIGRAM(S): at 21:50

## 2021-01-12 RX ADMIN — Medication 650 MILLIGRAM(S): at 11:43

## 2021-01-12 RX ADMIN — FAMOTIDINE 20 MILLIGRAM(S): 10 INJECTION INTRAVENOUS at 16:52

## 2021-01-12 RX ADMIN — Medication 3 MILLILITER(S): at 11:12

## 2021-01-12 RX ADMIN — ATORVASTATIN CALCIUM 20 MILLIGRAM(S): 80 TABLET, FILM COATED ORAL at 21:50

## 2021-01-12 RX ADMIN — PIPERACILLIN AND TAZOBACTAM 25 GRAM(S): 4; .5 INJECTION, POWDER, LYOPHILIZED, FOR SOLUTION INTRAVENOUS at 05:16

## 2021-01-12 RX ADMIN — Medication 3 MILLILITER(S): at 22:07

## 2021-01-12 RX ADMIN — HEPARIN SODIUM 7500 UNIT(S): 5000 INJECTION INTRAVENOUS; SUBCUTANEOUS at 16:51

## 2021-01-12 RX ADMIN — Medication 30 MILLIGRAM(S): at 11:43

## 2021-01-12 NOTE — PROGRESS NOTE ADULT - SUBJECTIVE AND OBJECTIVE BOX
JAME CHAIDEZ      60y   Male   [id number]         No Known Allergies             Daily     Daily Weight in k (2021 05:00)Drug Dosing Weight  Height (cm): 177.8 (2021 07:28)  Weight (kg): 107 (2021 07:28)  BMI (kg/m2): 33.8 (2021 07:28)  BSA (m2): 2.24 (2021 07:28)        59y/o male present for preop eval for scheduled bronchoscopy possible right upper lobectomy.  Pt with c/o h/o recent pneumonia.  Imaging study done Dx with nodule in right upper lung.   (2021 11:31)      Procedure: R thoracotomy RUL lobectomy 2021                         Issues:              Lung nodule              Postop pain  Schizophrenia  HLD  Hx of ETOH  Hx of smoking                 Home Medications:  clonazePAM 2 mg oral tablet: 1 tab(s) orally once a day (at bedtime) (2021 07:44)  Clozaril 100 mg oral tablet: 2 tab(s) orally once a day (in the evening) (2021 07:44)  Paxil 30 mg oral tablet: 2 tab(s) orally once a day (2021 07:44)  rosuvastatin 5 mg oral tablet: 1 tab(s) orally once a day (at bedtime) (2021 07:44)      PAST MEDICAL & SURGICAL HISTORY:  H/O pleural effusion  DX IN  when pt had pneumonia  chronic condition now    Obesity    Migraines    Pneumonia  2011    Smoking hx    H/O ETOH abuse  sober x 20 yrs    Drug abuse, in remission  sober x 20 yrs    HLD (hyperlipidemia)    Schizophrenia    Skin lesion  face   &quot;pre-cancerous&quot;    Cyst  scalp excised 20 yrs ago      Vital Signs Last 24 Hrs  T(C): 36.8 (2021 08:00), Max: 37.2 (2021 17:00)  T(F): 98.3 (2021 08:00), Max: 98.9 (2021 17:00)  HR: 84 (2021 08:00) (82 - 107)  BP: 112/68 (2021 08:00) (95/70 - 123/76)  BP(mean): 82 (2021 08:00) (74 - 90)  RR: 19 (2021 08:00) (13 - 28)  SpO2: 96% (2021 08:00) (93% - 100%)  I&O's Detail    2021 07:01  -  2021 07:00  --------------------------------------------------------  IN:    Oral Fluid: 476 mL  Total IN: 476 mL    OUT:    Chest Tube (mL): 50 mL    Indwelling Catheter - Urethral (mL): 625 mL    Voided (mL): 950 mL  Total OUT: 1625 mL    Total NET: -1149 mL        CAPILLARY BLOOD GLUCOSE        CAPILLARY BLOOD GLUCOSE          Home Medications:  clonazePAM 2 mg oral tablet: 1 tab(s) orally once a day (at bedtime) (2021 07:44)  Clozaril 100 mg oral tablet: 2 tab(s) orally once a day (in the evening) (2021 07:44)  Paxil 30 mg oral tablet: 2 tab(s) orally once a day (2021 07:44)  rosuvastatin 5 mg oral tablet: 1 tab(s) orally once a day (at bedtime) (2021 07:44)      MEDICATIONS  (STANDING):  acetaminophen    Suspension .. 650 milliGRAM(s) Oral every 6 hours  acetaminophen  IVPB .. 1000 milliGRAM(s) IV Intermittent once  albuterol/ipratropium for Nebulization 3 milliLiter(s) Nebulizer every 6 hours  atorvastatin 20 milliGRAM(s) Oral at bedtime  clonazePAM  Tablet 2 milliGRAM(s) Oral at bedtime  cloZAPine 200 milliGRAM(s) Oral at bedtime  dornase terry Solution 2.5 milliGRAM(s) Inhalation every 12 hours  famotidine    Tablet 20 milliGRAM(s) Oral every 12 hours  heparin   Injectable 5000 Unit(s) SubCutaneous every 8 hours  lactated ringers. 1000 milliLiter(s) (50 mL/Hr) IV Continuous <Continuous>  PARoxetine 30 milliGRAM(s) Oral daily  piperacillin/tazobactam IVPB.. 3.375 Gram(s) IV Intermittent every 8 hours  senna 2 Tablet(s) Oral at bedtime  sodium chloride 3%  Inhalation 4 milliLiter(s) Inhalation every 6 hours    MEDICATIONS  (PRN):  naloxone Injectable 0.1 milliGRAM(s) IV Push every 3 minutes PRN For ANY of the following changes in patient status:  A. RR LESS THAN 10 breaths per minute, B. Oxygen saturation LESS THAN 90%, C. Sedation score of 6  ondansetron Injectable 4 milliGRAM(s) IV Push every 6 hours PRN Nausea  oxyCODONE    Solution 5 milliGRAM(s) Oral every 3 hours PRN Moderate to Severe Pain (4 - 6)          Physical exam:                 General:               Pt is awake, alert, remains extubated,  sleepy but arousable                                                 Neuro:                  Nonfocal                             Cardiovascular:   S1 & S2, regular                          Respiratory:         Air entry is fair and equal on both sides, has bilateral conducted sounds                           GI:                          Soft, nondistended and nontender, Bowel sounds active                            Ext:                        No cyanosis or edema             Labs:                                                                           10.1   4.14  )-----------( 148      ( 2021 04:52 )             30.9             -12    142  |  106  |  13  ----------------------------<  123<H>  3.9   |  23  |  1.16    Ca    8.7      2021 04:52  Mg     2.2     -11                    PT/INR - ( 2021 05:03 )   PT: 12.7 sec;   INR: 1.12 ratio         PTT - ( 2021 05:03 )  PTT:25.1 sec        Culture - Blood (collected 10 Ronald 2021 10:24)  Source: .Blood Blood-Peripheral  Preliminary Report (2021 11:02):    No growth to date.        CXR:  < from: Xray Chest 1 View- PORTABLE-Urgent (Xray Chest 1 View- PORTABLE-Urgent .) (21 @ 15:20) >  Heart size and the mediastinum cannot be accurately evaluated on this projection.  Right lung volume loss with postsurgical changes again seen. A right basilar chest tube is unchanged in location. There is a small right apical pneumothorax which is not significantly changed. Right subcutaneous emphysema is again seen.  There is decreased right basilar opacity with residual suggesting a small loculated right pleural effusion andadjacent subsegmental atelectasis.  The left lung is clear. No left pleural effusion is seen.      IMPRESSION:  Right basilar chest tube unchanged in position.    Stable small right apical pneumothorax. Continued right subcutaneous emphysema.    Improved right basilar opacity with a small residual loculated right pleural effusion and adjacent subsegmental atelectasis seen.        Plan:    General:   60yMale s/p R thoracotomy RUL lobectomy 2021. Postop events significant for reintubation in the O.R. Pt was extubated and seems to be doing OK                             Neuro:                                         Pain control with  Tylenol, avoid narcotics as he is very sleepy    Schizophrenia: Continue Clonazepam / Clozapine and Paxil                             Cardiovascular:                                          Off Pressors    Received 2 albumins this AM    HLD: May restart Statin after extubation.                          Respiratory:                                         Pt remains extubated, not in any distress                                         Monitor chest tube output                                         Chest tube to water seal, may be d/cd today                                                          Continue bronchodilators, aggressive pulmonary toilet.                             GI                                                                                  Continue Zofran / Reglan for nausea - PRN	                                                                 Renal:                                        Monitor I/Os and electrolytes                                                                                        Hem/ Onc:                                                                                  No  signs of bleeding.                                          Follow CBC in AM                           Infectious disease:                                            Continue Zosyn for tracheobronchitis / fever                                           All surgical incision / chest tube  sites look clean                            Endocrine                                            Continue Accu-Checks with coverage    Pt is on SQ Heparin and Venodyne boots for DVT prophylaxis.     Pertinent clinical, laboratory, radiographic, hemodynamic, echocardiographic, respiratory data, microbiologic data and chart were reviewed and analyzed frequently throughout the course of the day and night  Patient seen, examined and plan discussed with CT Surgeon Dr. Daly  / CTICU team during rounds.    Physical therapy    Status discussed with patient /  updated plan of care           Armando Barajas MD

## 2021-01-13 ENCOUNTER — TRANSCRIPTION ENCOUNTER (OUTPATIENT)
Age: 61
End: 2021-01-13

## 2021-01-13 LAB
ANION GAP SERPL CALC-SCNC: 8 MMOL/L — SIGNIFICANT CHANGE UP (ref 7–14)
BUN SERPL-MCNC: 12 MG/DL — SIGNIFICANT CHANGE UP (ref 7–23)
CALCIUM SERPL-MCNC: 8.8 MG/DL — SIGNIFICANT CHANGE UP (ref 8.4–10.5)
CHLORIDE SERPL-SCNC: 107 MMOL/L — SIGNIFICANT CHANGE UP (ref 98–107)
CO2 SERPL-SCNC: 27 MMOL/L — SIGNIFICANT CHANGE UP (ref 22–31)
CREAT SERPL-MCNC: 1.29 MG/DL — SIGNIFICANT CHANGE UP (ref 0.5–1.3)
GLUCOSE SERPL-MCNC: 103 MG/DL — HIGH (ref 70–99)
HCT VFR BLD CALC: 30.1 % — LOW (ref 39–50)
HGB BLD-MCNC: 9.6 G/DL — LOW (ref 13–17)
MAGNESIUM SERPL-MCNC: 2.1 MG/DL — SIGNIFICANT CHANGE UP (ref 1.6–2.6)
MCHC RBC-ENTMCNC: 30.3 PG — SIGNIFICANT CHANGE UP (ref 27–34)
MCHC RBC-ENTMCNC: 31.9 GM/DL — LOW (ref 32–36)
MCV RBC AUTO: 95 FL — SIGNIFICANT CHANGE UP (ref 80–100)
NRBC # BLD: 0 /100 WBCS — SIGNIFICANT CHANGE UP
NRBC # FLD: 0 K/UL — SIGNIFICANT CHANGE UP
PLATELET # BLD AUTO: 163 K/UL — SIGNIFICANT CHANGE UP (ref 150–400)
POTASSIUM SERPL-MCNC: 3.5 MMOL/L — SIGNIFICANT CHANGE UP (ref 3.5–5.3)
POTASSIUM SERPL-SCNC: 3.5 MMOL/L — SIGNIFICANT CHANGE UP (ref 3.5–5.3)
RBC # BLD: 3.17 M/UL — LOW (ref 4.2–5.8)
RBC # FLD: 13.8 % — SIGNIFICANT CHANGE UP (ref 10.3–14.5)
SARS-COV-2 RNA SPEC QL NAA+PROBE: SIGNIFICANT CHANGE UP
SODIUM SERPL-SCNC: 142 MMOL/L — SIGNIFICANT CHANGE UP (ref 135–145)
WBC # BLD: 3.88 K/UL — SIGNIFICANT CHANGE UP (ref 3.8–10.5)
WBC # FLD AUTO: 3.88 K/UL — SIGNIFICANT CHANGE UP (ref 3.8–10.5)

## 2021-01-13 PROCEDURE — 71045 X-RAY EXAM CHEST 1 VIEW: CPT | Mod: 26

## 2021-01-13 RX ORDER — POLYETHYLENE GLYCOL 3350 17 G/17G
17 POWDER, FOR SOLUTION ORAL DAILY
Refills: 0 | Status: DISCONTINUED | OUTPATIENT
Start: 2021-01-13 | End: 2021-01-19

## 2021-01-13 RX ADMIN — SENNA PLUS 2 TABLET(S): 8.6 TABLET ORAL at 20:53

## 2021-01-13 RX ADMIN — Medication 650 MILLIGRAM(S): at 05:03

## 2021-01-13 RX ADMIN — OXYCODONE HYDROCHLORIDE 5 MILLIGRAM(S): 5 TABLET ORAL at 20:52

## 2021-01-13 RX ADMIN — DORNASE ALFA 2.5 MILLIGRAM(S): 1 SOLUTION RESPIRATORY (INHALATION) at 22:29

## 2021-01-13 RX ADMIN — HEPARIN SODIUM 5000 UNIT(S): 5000 INJECTION INTRAVENOUS; SUBCUTANEOUS at 12:17

## 2021-01-13 RX ADMIN — Medication 30 MILLIGRAM(S): at 15:06

## 2021-01-13 RX ADMIN — PIPERACILLIN AND TAZOBACTAM 25 GRAM(S): 4; .5 INJECTION, POWDER, LYOPHILIZED, FOR SOLUTION INTRAVENOUS at 05:03

## 2021-01-13 RX ADMIN — FAMOTIDINE 20 MILLIGRAM(S): 10 INJECTION INTRAVENOUS at 17:35

## 2021-01-13 RX ADMIN — Medication 3 MILLILITER(S): at 10:15

## 2021-01-13 RX ADMIN — Medication 3 MILLILITER(S): at 22:05

## 2021-01-13 RX ADMIN — OXYCODONE HYDROCHLORIDE 5 MILLIGRAM(S): 5 TABLET ORAL at 12:54

## 2021-01-13 RX ADMIN — HEPARIN SODIUM 7500 UNIT(S): 5000 INJECTION INTRAVENOUS; SUBCUTANEOUS at 05:03

## 2021-01-13 RX ADMIN — Medication 2 MILLIGRAM(S): at 20:53

## 2021-01-13 RX ADMIN — OXYCODONE HYDROCHLORIDE 5 MILLIGRAM(S): 5 TABLET ORAL at 17:35

## 2021-01-13 RX ADMIN — SODIUM CHLORIDE 4 MILLILITER(S): 9 INJECTION INTRAMUSCULAR; INTRAVENOUS; SUBCUTANEOUS at 22:05

## 2021-01-13 RX ADMIN — SODIUM CHLORIDE 4 MILLILITER(S): 9 INJECTION INTRAMUSCULAR; INTRAVENOUS; SUBCUTANEOUS at 10:15

## 2021-01-13 RX ADMIN — Medication 3 MILLILITER(S): at 16:10

## 2021-01-13 RX ADMIN — FAMOTIDINE 20 MILLIGRAM(S): 10 INJECTION INTRAVENOUS at 05:03

## 2021-01-13 RX ADMIN — ATORVASTATIN CALCIUM 20 MILLIGRAM(S): 80 TABLET, FILM COATED ORAL at 20:53

## 2021-01-13 RX ADMIN — CLOZAPINE 200 MILLIGRAM(S): 150 TABLET, ORALLY DISINTEGRATING ORAL at 20:53

## 2021-01-13 RX ADMIN — SODIUM CHLORIDE 4 MILLILITER(S): 9 INJECTION INTRAMUSCULAR; INTRAVENOUS; SUBCUTANEOUS at 16:10

## 2021-01-13 RX ADMIN — POLYETHYLENE GLYCOL 3350 17 GRAM(S): 17 POWDER, FOR SOLUTION ORAL at 12:17

## 2021-01-13 NOTE — DISCHARGE NOTE PROVIDER - NSDCFUADDAPPT_GEN_ALL_CORE_FT
Follow up with Dr. Daly in 7-10 days  Chest x-ray 1-2 days prior to appointment with Dr. Daly.  Please bring copy of x-ray to appointment with Dr. Daly   Follow up with primary care provider in one week

## 2021-01-13 NOTE — DISCHARGE NOTE PROVIDER - CARE PROVIDER_API CALL
Roberto Daly)  Surgery; Thoracic Surgery  [address] [place] [address] [place] [address]  Phone: [phone]  Fax: [phone]  Follow Up Time:

## 2021-01-13 NOTE — PROGRESS NOTE ADULT - SUBJECTIVE AND OBJECTIVE BOX
Subjective: "I feel ok. I'm not sure what I want to do when I leave here" Pt lethargic but arousable. Encouraged to do more activity, get OOB. Denies any  CP or SOB. Denies incisional pain. OOB w assist. PT recommending Rehab.     Vital Signs:  Vital Signs Last 24 Hrs  T(C): 36.6 (01-13-21 @ 05:00), Max: 36.8 (01-12-21 @ 19:51)  T(F): 97.9 (01-13-21 @ 05:00), Max: 98.2 (01-12-21 @ 19:51)  HR: 76 (01-13-21 @ 10:16) (72 - 96)  BP: 107/65 (01-13-21 @ 05:00) (92/75 - 120/50)  RR: 18 (01-13-21 @ 05:00) (15 - 23)  SpO2: 100% (01-13-21 @ 10:16) (95% - 100%) on (O2)    Telemetry/Alarms:  General: WN/WD NAD  Neurology: Awake, nonfocal, GLOVER x 4  Eyes: Scleras clear, PERRLA/ EOMI, Gross vision intact  ENT:Gross hearing intact, grossly patent pharynx, no stridor  Neck: Neck supple, trachea midline, No JVD,   Respiratory: Decreased BS Right mid to base. No cough  CV: RRR, S1S2, no murmurs, rubs or gallops  Abdominal: Soft, NT, ND +BS, No BM since surgery per pt. +flatus.   Extremities: No edema, + peripheral pulses  Skin: No Rashes, Hematoma, Ecchymosis  Lymphatic: No Neck, axilla, groin LAD  Psych: Oriented x 3, normal affect  Incisions: Rt. Thoracotomy site c/d/i.   Tubes: All CTs removed.   Relevant labs, radiology and Medications reviewed           CXR stable. Sml rt apical ptx and atelectasis.              9.6    3.88  )-----------( 163      ( 13 Jan 2021 06:33 )             30.1     01-13    142  |  107  |  12  ----------------------------<  103<H>  3.5   |  27  |  1.29    Ca    8.8      13 Jan 2021 06:33  Mg     2.1     01-13        MEDICATIONS  (STANDING):  acetaminophen  IVPB .. 1000 milliGRAM(s) IV Intermittent once  albuterol/ipratropium for Nebulization 3 milliLiter(s) Nebulizer every 6 hours  atorvastatin 20 milliGRAM(s) Oral at bedtime  clonazePAM  Tablet 2 milliGRAM(s) Oral at bedtime  cloZAPine 200 milliGRAM(s) Oral at bedtime  dornase terry Solution 2.5 milliGRAM(s) Inhalation every 12 hours  famotidine    Tablet 20 milliGRAM(s) Oral every 12 hours  heparin   Injectable 7500 Unit(s) SubCutaneous every 8 hours  PARoxetine 30 milliGRAM(s) Oral daily  polyethylene glycol 3350 17 Gram(s) Oral daily  senna 2 Tablet(s) Oral at bedtime  sodium chloride 3%  Inhalation 4 milliLiter(s) Inhalation every 6 hours    MEDICATIONS  (PRN):  bisacodyl Suppository 10 milliGRAM(s) Rectal daily PRN Constipation  naloxone Injectable 0.1 milliGRAM(s) IV Push every 3 minutes PRN For ANY of the following changes in patient status:  A. RR LESS THAN 10 breaths per minute, B. Oxygen saturation LESS THAN 90%, C. Sedation score of 6  ondansetron Injectable 4 milliGRAM(s) IV Push every 6 hours PRN Nausea  oxyCODONE    Solution 5 milliGRAM(s) Oral every 3 hours PRN Moderate to Severe Pain (4 - 6)    Pertinent Physical Exam  I&O's Summary    12 Jan 2021 07:01  -  13 Jan 2021 07:00  --------------------------------------------------------  IN: 295 mL / OUT: 450 mL / NET: -155 mL        Assessment  60y Male  w/ PAST MEDICAL & SURGICAL HISTORY:  H/O pleural effusion  DX IN 2011 when pt had pneumonia  chronic condition now    Obesity    Migraines    Pneumonia  2011    Smoking hx    H/O ETOH abuse  sober x 20 yrs    Drug abuse, in remission  sober x 20 yrs    HLD (hyperlipidemia)    Schizophrenia    Skin lesion  face   &quot;pre-cancerous&quot;    Cyst  scalp excised 20 yrs ago    admitted with complaints of Patient is a 60y old  Male who presents with a chief complaint of "right lung nodule" (11 Jan 2021 11:40)  .  On (Date), patient underwent Bronchoscopy, with lobectomy by thoracotomy using VATS  59y/o male present for preop eval for scheduled bronchoscopy possible right upper lobectomy.  Pt with c/o h/o recent pneumonia.  Imaging study done Dx with nodule in right upper lung.   (05 Jan 2021 11:31)      Procedure: R thoracotomy RUL lobectomy 1/7/2021                         Issues:              Lung nodule              Postop pain  Schizophrenia  HLD  Hx of ETOH  Hx of smoking    Pt intubated post op. Started on Zosyn for copious resp secretions. Recovered well. All cultures negative. All CTs now removed. PT recommending rehab.     PLAN  Neuro: Pain management  Pulm: Encourage coughing, deep breathing and use of incentive spirometry. Daily CXR.   Cardio: Monitor telemetry/alarms  GI: Tolerating diet. Continue stool softeners. Will give suppository today  Renal: monitor urine output, supplement electrolytes as needed  Vasc: Heparin SC/SCDs for DVT prophylaxis  Heme: Stable H/H. .   ID: All cultures negative. No fever or leukocytosis. Will d/c Zosyn. .  Therapy: OOB/ambulate  Disposition: Aim to D/C to Rehab when bed available.   Discussed with Cardiothoracic Team at AM rounds.

## 2021-01-13 NOTE — DISCHARGE NOTE PROVIDER - NSDCCPTREATMENT_GEN_ALL_CORE_FT
PRINCIPAL PROCEDURE  Procedure: Bronchoscopy, with lobectomy by thoracotomy using VATS  Findings and Treatment:

## 2021-01-13 NOTE — DISCHARGE NOTE PROVIDER - HOSPITAL COURSE
Patient is a 60y old  Male who presents with a chief complaint of "right lung nodule" Patient with complain of recent pneumonia, imaging study done.  Diagnosed with nodule in right upper lung.    On 1/7/2021 patient underwent Bronchoscopy, right vats, thoracotomy and right upper lobectomy. Pt intubated post op. Started on Zosyn for copious resp secretions. Recovered well. All cultures negative. All CTs now removed. PT recommending rehab.    Patient is a 60y old  Male who presents with a chief complaint of "right lung nodule" Patient with complain of recent pneumonia, imaging study done.  Diagnosed with nodule in right upper lung.    On 1/7/2021 patient underwent Bronchoscopy, right vats, thoracotomy and right upper lobectomy. Pt intubated post op. Started on Zosyn for copious resp secretions. Recovered well. All cultures negative. All CTs now removed. PT discharged to rehab to follow up as an outpatient. Patient is a 60y old  Male who presents with a chief complaint of "right lung nodule" Patient with complain of recent pneumonia, imaging study done.  Diagnosed with nodule in right upper lung.    On 1/7/2021 patient underwent Bronchoscopy, right vats, thoracotomy and right upper lobectomy. Pt intubated post op. Started on Zosyn for copious resp secretions. Recovered well. All cultures negative. All CTs now removed. PT discharged to home, cleared by physical therapy,  follow up as an outpatient.

## 2021-01-13 NOTE — DISCHARGE NOTE PROVIDER - NSDCCPCAREPLAN_GEN_ALL_CORE_FT
PRINCIPAL DISCHARGE DIAGNOSIS  Diagnosis: Other nonspecific abnormal finding of lung field  Assessment and Plan of Treatment:

## 2021-01-13 NOTE — DISCHARGE NOTE PROVIDER - NSDCFUADDINST_GEN_ALL_CORE_FT
Please call Dr Daly's office if you develop fever, shortness of breath, chest pain or redness/discharge at chest tube site. Remove all dressings and shower daily. You may cover stitch with a band aid if desired

## 2021-01-13 NOTE — DISCHARGE NOTE PROVIDER - NSDCMRMEDTOKEN_GEN_ALL_CORE_FT
clonazePAM 2 mg oral tablet: 1 tab(s) orally once a day (at bedtime)  Clozaril 100 mg oral tablet: 2 tab(s) orally once a day (in the evening)  Paxil 30 mg oral tablet: 2 tab(s) orally once a day  rosuvastatin 5 mg oral tablet: 1 tab(s) orally once a day (at bedtime)   chest xray PA and lateral: Dx s/p RVATS thoracotomy RUL lobectomy  clonazePAM 2 mg oral tablet: 1 tab(s) orally once a day (at bedtime)  Clozaril 100 mg oral tablet: 2 tab(s) orally once a day (in the evening)  oxyCODONE 5 mg/5 mL oral solution: 5 milliliter(s) orally every 4 hours (5 times/day), As Needed -Moderate to Severe Pain (4 - 6) MDD:30mL  Paxil 30 mg oral tablet: 2 tab(s) orally once a day  polyethylene glycol 3350 oral powder for reconstitution: 17 gram(s) orally once a day  rosuvastatin 5 mg oral tablet: 1 tab(s) orally once a day (at bedtime)  senna oral tablet: 2 tab(s) orally once a day (at bedtime)  Tylenol 325 mg oral tablet: 2 tab(s) orally every 4 hours as needed for pain control   bisacodyl 10 mg rectal suppository: 1 suppository(ies) rectal once a day, As needed, Constipation  cephalexin 500 mg oral capsule: 1 cap(s) orally 4 times a day until 1/25/21 then stop  chest xray PA and lateral: Dx s/p RVATS thoracotomy RUL lobectomy  clonazePAM 2 mg oral tablet: 1 tab(s) orally once a day (at bedtime)  Clozaril 100 mg oral tablet: 2 tab(s) orally once a day (in the evening)  oxyCODONE 5 mg oral tablet: 1 tab(s) orally every 4 hours, As needed, Moderate Pain (4 - 6)  Paxil 30 mg oral tablet: 2 tab(s) orally once a day  polyethylene glycol 3350 oral powder for reconstitution: 17 gram(s) orally once a day  rosuvastatin 5 mg oral tablet: 1 tab(s) orally once a day (at bedtime)  senna oral tablet: 2 tab(s) orally once a day (at bedtime)  Tylenol 325 mg oral tablet: 2 tab(s) orally every 4 hours, As Needed mild pain   bisacodyl 10 mg rectal suppository: 1 suppository(ies) rectal once a day, As needed, Constipation  cephalexin 500 mg oral capsule: 1 cap(s) orally 4 times a day until 1/25/21 then stop  chest xray PA and lateral: Dx s/p RVATS thoracotomy RUL lobectomy  clonazePAM 2 mg oral tablet: 1 tab(s) orally once a day (at bedtime)  Clozaril 100 mg oral tablet: 2 tab(s) orally once a day (in the evening)  oxyCODONE 5 mg oral tablet: 1 tab(s) orally every 4 hours, As Needed -for moderate pain MDD:6   Paxil 30 mg oral tablet: 2 tab(s) orally once a day  polyethylene glycol 3350 oral powder for reconstitution: 17 gram(s) orally once a day  rosuvastatin 5 mg oral tablet: 1 tab(s) orally once a day (at bedtime)  senna oral tablet: 2 tab(s) orally once a day (at bedtime)  Tylenol 325 mg oral tablet: 2 tab(s) orally every 4 hours, As Needed mild pain

## 2021-01-14 ENCOUNTER — TRANSCRIPTION ENCOUNTER (OUTPATIENT)
Age: 61
End: 2021-01-14

## 2021-01-14 PROCEDURE — 71045 X-RAY EXAM CHEST 1 VIEW: CPT | Mod: 26

## 2021-01-14 RX ORDER — CLONAZEPAM 1 MG
2 TABLET ORAL AT BEDTIME
Refills: 0 | Status: DISCONTINUED | OUTPATIENT
Start: 2021-01-14 | End: 2021-01-19

## 2021-01-14 RX ORDER — SENNA PLUS 8.6 MG/1
2 TABLET ORAL
Qty: 0 | Refills: 0 | DISCHARGE
Start: 2021-01-14

## 2021-01-14 RX ORDER — POLYETHYLENE GLYCOL 3350 17 G/17G
17 POWDER, FOR SOLUTION ORAL
Qty: 0 | Refills: 0 | DISCHARGE
Start: 2021-01-14

## 2021-01-14 RX ORDER — OXYCODONE HYDROCHLORIDE 5 MG/1
5 TABLET ORAL
Qty: 150 | Refills: 0
Start: 2021-01-14 | End: 2021-01-19

## 2021-01-14 RX ADMIN — SODIUM CHLORIDE 4 MILLILITER(S): 9 INJECTION INTRAMUSCULAR; INTRAVENOUS; SUBCUTANEOUS at 22:32

## 2021-01-14 RX ADMIN — OXYCODONE HYDROCHLORIDE 5 MILLIGRAM(S): 5 TABLET ORAL at 10:49

## 2021-01-14 RX ADMIN — HEPARIN SODIUM 7500 UNIT(S): 5000 INJECTION INTRAVENOUS; SUBCUTANEOUS at 21:51

## 2021-01-14 RX ADMIN — SENNA PLUS 2 TABLET(S): 8.6 TABLET ORAL at 21:50

## 2021-01-14 RX ADMIN — Medication 3 MILLILITER(S): at 22:32

## 2021-01-14 RX ADMIN — Medication 3 MILLILITER(S): at 04:25

## 2021-01-14 RX ADMIN — SODIUM CHLORIDE 4 MILLILITER(S): 9 INJECTION INTRAMUSCULAR; INTRAVENOUS; SUBCUTANEOUS at 16:53

## 2021-01-14 RX ADMIN — OXYCODONE HYDROCHLORIDE 5 MILLIGRAM(S): 5 TABLET ORAL at 05:25

## 2021-01-14 RX ADMIN — DORNASE ALFA 2.5 MILLIGRAM(S): 1 SOLUTION RESPIRATORY (INHALATION) at 22:51

## 2021-01-14 RX ADMIN — POLYETHYLENE GLYCOL 3350 17 GRAM(S): 17 POWDER, FOR SOLUTION ORAL at 10:49

## 2021-01-14 RX ADMIN — CLOZAPINE 200 MILLIGRAM(S): 150 TABLET, ORALLY DISINTEGRATING ORAL at 21:51

## 2021-01-14 RX ADMIN — Medication 3 MILLILITER(S): at 16:49

## 2021-01-14 RX ADMIN — Medication 30 MILLIGRAM(S): at 10:50

## 2021-01-14 RX ADMIN — HEPARIN SODIUM 7500 UNIT(S): 5000 INJECTION INTRAVENOUS; SUBCUTANEOUS at 12:58

## 2021-01-14 RX ADMIN — Medication 2 MILLIGRAM(S): at 21:50

## 2021-01-14 RX ADMIN — OXYCODONE HYDROCHLORIDE 5 MILLIGRAM(S): 5 TABLET ORAL at 17:46

## 2021-01-14 RX ADMIN — SODIUM CHLORIDE 4 MILLILITER(S): 9 INJECTION INTRAMUSCULAR; INTRAVENOUS; SUBCUTANEOUS at 04:25

## 2021-01-14 RX ADMIN — HEPARIN SODIUM 7500 UNIT(S): 5000 INJECTION INTRAVENOUS; SUBCUTANEOUS at 05:25

## 2021-01-14 RX ADMIN — ATORVASTATIN CALCIUM 20 MILLIGRAM(S): 80 TABLET, FILM COATED ORAL at 21:51

## 2021-01-14 RX ADMIN — FAMOTIDINE 20 MILLIGRAM(S): 10 INJECTION INTRAVENOUS at 17:21

## 2021-01-14 RX ADMIN — FAMOTIDINE 20 MILLIGRAM(S): 10 INJECTION INTRAVENOUS at 05:25

## 2021-01-14 NOTE — DISCHARGE NOTE NURSING/CASE MANAGEMENT/SOCIAL WORK - PATIENT PORTAL LINK FT
You can access the FollowMyHealth Patient Portal offered by [facility] by registering at the following website: [email or web address]. By joining [facility]’s FollowMyHealth portal, you will also be able to view your health information using other applications (apps) compatible with our system.

## 2021-01-14 NOTE — PROGRESS NOTE ADULT - SUBJECTIVE AND OBJECTIVE BOX
Subjective: no acute complaints, awaiting rehab placement    Vital Signs:  Vital Signs Last 24 Hrs  T(C): 37.4 (01-14-21 @ 11:44), Max: 37.5 (01-13-21 @ 20:11)  T(F): 99.4 (01-14-21 @ 11:44), Max: 99.5 (01-13-21 @ 20:11)  HR: 95 (01-14-21 @ 11:44) (72 - 96)  BP: 125/77 (01-14-21 @ 11:44) (108/89 - 125/77)  RR: 16 (01-14-21 @ 11:44) (16 - 17)  SpO2: 97% (01-14-21 @ 11:44) (90% - 97%) on (O2)    Telemetry/Alarms: sr  General: WN/WD NAD  Neurology: Awake, nonfocal, GLOVER x 4  Eyes: Scleras clear, PERRLA/ EOMI, Gross vision intact  ENT:Gross hearing intact, grossly patent pharynx, no stridor  Neck: Neck supple, trachea midline, No JVD,   Respiratory: CTA B/L, No wheezing, rales, rhonchi  CV: RRR, S1S2, no murmurs, rubs or gallops  Abdominal: Soft, NT, ND +BS,   Extremities: No edema, + peripheral pulses  Skin: No Rashes, Hematoma, Ecchymosis  Lymphatic: No Neck, axilla, groin LAD  Psych: Oriented x 3, normal affect  Incisions: c,d,i  Tubes: last tube removed 1/12  Relevant labs, radiology and Medications reviewed                        9.6    3.88  )-----------( 163      ( 13 Jan 2021 06:33 )             30.1     01-13    142  |  107  |  12  ----------------------------<  103<H>  3.5   |  27  |  1.29    Ca    8.8      13 Jan 2021 06:33  Mg     2.1     01-13        MEDICATIONS  (STANDING):  acetaminophen  IVPB .. 1000 milliGRAM(s) IV Intermittent once  albuterol/ipratropium for Nebulization 3 milliLiter(s) Nebulizer every 6 hours  atorvastatin 20 milliGRAM(s) Oral at bedtime  clonazePAM  Tablet 2 milliGRAM(s) Oral at bedtime  cloZAPine 200 milliGRAM(s) Oral at bedtime  dornase terry Solution 2.5 milliGRAM(s) Inhalation every 12 hours  famotidine    Tablet 20 milliGRAM(s) Oral every 12 hours  heparin   Injectable 7500 Unit(s) SubCutaneous every 8 hours  PARoxetine 30 milliGRAM(s) Oral daily  polyethylene glycol 3350 17 Gram(s) Oral daily  senna 2 Tablet(s) Oral at bedtime  sodium chloride 3%  Inhalation 4 milliLiter(s) Inhalation every 6 hours    MEDICATIONS  (PRN):  bisacodyl Suppository 10 milliGRAM(s) Rectal daily PRN Constipation  naloxone Injectable 0.1 milliGRAM(s) IV Push every 3 minutes PRN For ANY of the following changes in patient status:  A. RR LESS THAN 10 breaths per minute, B. Oxygen saturation LESS THAN 90%, C. Sedation score of 6  ondansetron Injectable 4 milliGRAM(s) IV Push every 6 hours PRN Nausea  oxyCODONE    Solution 5 milliGRAM(s) Oral every 3 hours PRN Moderate to Severe Pain (4 - 6)    Pertinent Physical Exam  I&O's Summary    13 Jan 2021 07:01  -  14 Jan 2021 07:00  --------------------------------------------------------  IN: 0 mL / OUT: 0 mL / NET: 0 mL        Assessment  60y Male  w/ PAST MEDICAL & SURGICAL HISTORY:  H/O pleural effusion  DX IN 2011 when pt had pneumonia  chronic condition now    Obesity    Migraines    Pneumonia  2011    Smoking hx    H/O ETOH abuse  sober x 20 yrs    Drug abuse, in remission  sober x 20 yrs    HLD (hyperlipidemia)    Schizophrenia    Skin lesion  face   &quot;pre-cancerous&quot;    Cyst  scalp excised 20 yrs ago    Pt intubated post op. Started on Zosyn for copious resp secretions. Recovered well. All cultures negative. All CTs now removed. PT recommending rehab.     PLAN  Neuro: Pain management  Pulm: Encourage coughing, deep breathing and use of incentive spirometry. Daily CXR.   Cardio: Monitor telemetry/alarms  GI: Tolerating diet. Continue stool softeners. Will give suppository today  Renal: monitor urine output, supplement electrolytes as needed  Vasc: Heparin SC/SCDs for DVT prophylaxis  Heme: Stable H/H. .   ID: All cultures negative. No fever or leukocytosis. Off antibiotics  Therapy: OOB/ambulate  Disposition: Awaiting insurance authorization for rehab placement  Discussed with Cardiothoracic Team at AM rounds.

## 2021-01-15 LAB
ANION GAP SERPL CALC-SCNC: 10 MMOL/L — SIGNIFICANT CHANGE UP (ref 7–14)
BUN SERPL-MCNC: 15 MG/DL — SIGNIFICANT CHANGE UP (ref 7–23)
CALCIUM SERPL-MCNC: 9 MG/DL — SIGNIFICANT CHANGE UP (ref 8.4–10.5)
CHLORIDE SERPL-SCNC: 103 MMOL/L — SIGNIFICANT CHANGE UP (ref 98–107)
CO2 SERPL-SCNC: 25 MMOL/L — SIGNIFICANT CHANGE UP (ref 22–31)
CREAT SERPL-MCNC: 1.13 MG/DL — SIGNIFICANT CHANGE UP (ref 0.5–1.3)
CULTURE RESULTS: SIGNIFICANT CHANGE UP
GLUCOSE SERPL-MCNC: 94 MG/DL — SIGNIFICANT CHANGE UP (ref 70–99)
HCT VFR BLD CALC: 30.1 % — LOW (ref 39–50)
HGB BLD-MCNC: 9.9 G/DL — LOW (ref 13–17)
MCHC RBC-ENTMCNC: 30.4 PG — SIGNIFICANT CHANGE UP (ref 27–34)
MCHC RBC-ENTMCNC: 32.9 GM/DL — SIGNIFICANT CHANGE UP (ref 32–36)
MCV RBC AUTO: 92.3 FL — SIGNIFICANT CHANGE UP (ref 80–100)
NRBC # BLD: 0 /100 WBCS — SIGNIFICANT CHANGE UP
NRBC # FLD: 0 K/UL — SIGNIFICANT CHANGE UP
PLATELET # BLD AUTO: 194 K/UL — SIGNIFICANT CHANGE UP (ref 150–400)
POTASSIUM SERPL-MCNC: 4 MMOL/L — SIGNIFICANT CHANGE UP (ref 3.5–5.3)
POTASSIUM SERPL-SCNC: 4 MMOL/L — SIGNIFICANT CHANGE UP (ref 3.5–5.3)
RBC # BLD: 3.26 M/UL — LOW (ref 4.2–5.8)
RBC # FLD: 13.2 % — SIGNIFICANT CHANGE UP (ref 10.3–14.5)
SODIUM SERPL-SCNC: 138 MMOL/L — SIGNIFICANT CHANGE UP (ref 135–145)
SPECIMEN SOURCE: SIGNIFICANT CHANGE UP
WBC # BLD: 4.98 K/UL — SIGNIFICANT CHANGE UP (ref 3.8–10.5)
WBC # FLD AUTO: 4.98 K/UL — SIGNIFICANT CHANGE UP (ref 3.8–10.5)

## 2021-01-15 PROCEDURE — 71045 X-RAY EXAM CHEST 1 VIEW: CPT | Mod: 26

## 2021-01-15 RX ORDER — OXYCODONE HYDROCHLORIDE 5 MG/1
10 TABLET ORAL EVERY 4 HOURS
Refills: 0 | Status: DISCONTINUED | OUTPATIENT
Start: 2021-01-15 | End: 2021-01-18

## 2021-01-15 RX ORDER — FUROSEMIDE 40 MG
20 TABLET ORAL ONCE
Refills: 0 | Status: COMPLETED | OUTPATIENT
Start: 2021-01-15 | End: 2021-01-15

## 2021-01-15 RX ORDER — GABAPENTIN 400 MG/1
2 CAPSULE ORAL
Qty: 0 | Refills: 0 | DISCHARGE
Start: 2021-01-15

## 2021-01-15 RX ORDER — CEPHALEXIN 500 MG
1 CAPSULE ORAL
Qty: 0 | Refills: 0 | DISCHARGE
Start: 2021-01-15

## 2021-01-15 RX ORDER — CEPHALEXIN 500 MG
500 CAPSULE ORAL
Refills: 0 | Status: DISCONTINUED | OUTPATIENT
Start: 2021-01-15 | End: 2021-01-19

## 2021-01-15 RX ORDER — OXYCODONE HYDROCHLORIDE 5 MG/1
1 TABLET ORAL
Qty: 0 | Refills: 0 | DISCHARGE
Start: 2021-01-15

## 2021-01-15 RX ORDER — OXYCODONE HYDROCHLORIDE 5 MG/1
5 TABLET ORAL EVERY 4 HOURS
Refills: 0 | Status: DISCONTINUED | OUTPATIENT
Start: 2021-01-15 | End: 2021-01-18

## 2021-01-15 RX ORDER — GABAPENTIN 400 MG/1
200 CAPSULE ORAL EVERY 8 HOURS
Refills: 0 | Status: DISCONTINUED | OUTPATIENT
Start: 2021-01-15 | End: 2021-01-18

## 2021-01-15 RX ORDER — ACETAMINOPHEN 500 MG
650 TABLET ORAL EVERY 6 HOURS
Refills: 0 | Status: DISCONTINUED | OUTPATIENT
Start: 2021-01-15 | End: 2021-01-19

## 2021-01-15 RX ADMIN — SENNA PLUS 2 TABLET(S): 8.6 TABLET ORAL at 23:49

## 2021-01-15 RX ADMIN — CLOZAPINE 200 MILLIGRAM(S): 150 TABLET, ORALLY DISINTEGRATING ORAL at 23:44

## 2021-01-15 RX ADMIN — FAMOTIDINE 20 MILLIGRAM(S): 10 INJECTION INTRAVENOUS at 15:39

## 2021-01-15 RX ADMIN — Medication 3 MILLILITER(S): at 22:50

## 2021-01-15 RX ADMIN — HEPARIN SODIUM 7500 UNIT(S): 5000 INJECTION INTRAVENOUS; SUBCUTANEOUS at 06:15

## 2021-01-15 RX ADMIN — POLYETHYLENE GLYCOL 3350 17 GRAM(S): 17 POWDER, FOR SOLUTION ORAL at 10:45

## 2021-01-15 RX ADMIN — Medication 30 MILLIGRAM(S): at 10:45

## 2021-01-15 RX ADMIN — OXYCODONE HYDROCHLORIDE 10 MILLIGRAM(S): 5 TABLET ORAL at 15:38

## 2021-01-15 RX ADMIN — Medication 20 MILLIGRAM(S): at 15:38

## 2021-01-15 RX ADMIN — OXYCODONE HYDROCHLORIDE 10 MILLIGRAM(S): 5 TABLET ORAL at 23:44

## 2021-01-15 RX ADMIN — FAMOTIDINE 20 MILLIGRAM(S): 10 INJECTION INTRAVENOUS at 06:15

## 2021-01-15 RX ADMIN — OXYCODONE HYDROCHLORIDE 10 MILLIGRAM(S): 5 TABLET ORAL at 01:55

## 2021-01-15 RX ADMIN — Medication 2 MILLIGRAM(S): at 23:43

## 2021-01-15 RX ADMIN — Medication 10 MILLIGRAM(S): at 17:26

## 2021-01-15 RX ADMIN — GABAPENTIN 200 MILLIGRAM(S): 400 CAPSULE ORAL at 23:43

## 2021-01-15 RX ADMIN — GABAPENTIN 200 MILLIGRAM(S): 400 CAPSULE ORAL at 12:19

## 2021-01-15 RX ADMIN — Medication 500 MILLIGRAM(S): at 23:49

## 2021-01-15 RX ADMIN — HEPARIN SODIUM 5000 UNIT(S): 5000 INJECTION INTRAVENOUS; SUBCUTANEOUS at 23:50

## 2021-01-15 RX ADMIN — OXYCODONE HYDROCHLORIDE 10 MILLIGRAM(S): 5 TABLET ORAL at 10:45

## 2021-01-15 RX ADMIN — Medication 500 MILLIGRAM(S): at 12:19

## 2021-01-15 RX ADMIN — SODIUM CHLORIDE 4 MILLILITER(S): 9 INJECTION INTRAMUSCULAR; INTRAVENOUS; SUBCUTANEOUS at 22:55

## 2021-01-15 RX ADMIN — Medication 500 MILLIGRAM(S): at 17:26

## 2021-01-15 RX ADMIN — ATORVASTATIN CALCIUM 20 MILLIGRAM(S): 80 TABLET, FILM COATED ORAL at 23:43

## 2021-01-15 NOTE — PROGRESS NOTE ADULT - SUBJECTIVE AND OBJECTIVE BOX
Subjective: "I feel like I have bronchitis or something. It hurts when I cough and I'm coughing more today" Pt states pain not well controlled. No relief from oxy. However EMR shows pt has not regularly taking pain meds. Amb through hallway, no resp distress. Not needing O2. States no BM in several days.     Vital Signs:  Vital Signs Last 24 Hrs  T(C): 37.3 (01-15-21 @ 06:14), Max: 37.3 (01-15-21 @ 06:14)  T(F): 99.2 (01-15-21 @ 06:14), Max: 99.2 (01-15-21 @ 06:14)  HR: 81 (01-15-21 @ 06:14) (72 - 91)  BP: 121/73 (01-15-21 @ 06:14) (106/676 - 124/77)  RR: 17 (01-15-21 @ 06:14) (17 - 18)  SpO2: 96% (01-15-21 @ 06:14) (94% - 99%) on (O2)    Telemetry/Alarms:  General: WN/WD NAD. Sleepy at times. Needs encouragement to walk   Neurology: Awake, nonfocal, GLOVER x 4  Eyes: Scleras clear, PERRLA/ EOMI, Gross vision intact  ENT:Gross hearing intact, grossly patent pharynx, no stridor  Neck: Neck supple, trachea midline, No JVD,   Respiratory: Decreased BS rt. mid to base. non prod cough  CV: RRR, S1S2, no murmurs, rubs or gallops  Abdominal: Soft, NT, ND +BS, no BMx mult days. Had suppository 2 days ago w no relief.   Extremities: Trace LE edema, + peripheral pulses  Skin: No Rashes, Hematoma, Ecchymosis  Lymphatic: No Neck, axilla, groin LAD  Psych: Oriented x 3, normal affect  Incisions: Rt. Thoracotomy KESHAV and c/d/i.   Tubes: Old CT sites with mild erythema, no drainage.   Relevant labs, radiology and Medications reviewed           CXR w worsening Rt. LL atelectasis/effusion             9.9    4.98  )-----------( 194      ( 15 Ronald 2021 12:53 )             30.1     01-15    138  |  103  |  15  ----------------------------<  94  4.0   |  25  |  1.13    Ca    9.0      15 Ronald 2021 12:53        MEDICATIONS  (STANDING):  acetaminophen  IVPB .. 1000 milliGRAM(s) IV Intermittent once  albuterol/ipratropium for Nebulization 3 milliLiter(s) Nebulizer every 6 hours  atorvastatin 20 milliGRAM(s) Oral at bedtime  cephalexin 500 milliGRAM(s) Oral four times a day  clonazePAM  Tablet 2 milliGRAM(s) Oral at bedtime  cloZAPine 200 milliGRAM(s) Oral at bedtime  famotidine    Tablet 20 milliGRAM(s) Oral every 12 hours  gabapentin 200 milliGRAM(s) Oral every 8 hours  heparin   Injectable 7500 Unit(s) SubCutaneous every 8 hours  PARoxetine 30 milliGRAM(s) Oral daily  polyethylene glycol 3350 17 Gram(s) Oral daily  saline laxative (FLEET) Rectal Enema 1 Enema Rectal once  senna 2 Tablet(s) Oral at bedtime  sodium chloride 3%  Inhalation 4 milliLiter(s) Inhalation every 6 hours    MEDICATIONS  (PRN):  acetaminophen   Tablet .. 650 milliGRAM(s) Oral every 6 hours PRN Temp greater or equal to 38C (100.4F), Mild Pain (1 - 3)  bisacodyl Suppository 10 milliGRAM(s) Rectal daily PRN Constipation  oxyCODONE    IR 5 milliGRAM(s) Oral every 4 hours PRN Moderate Pain (4 - 6)  oxyCODONE    IR 10 milliGRAM(s) Oral every 4 hours PRN Severe Pain (7 - 10)    Pertinent Physical Exam  I&O's Summary    14 Jan 2021 07:01  -  15 Jan 2021 07:00  --------------------------------------------------------  IN: 0 mL / OUT: 500 mL / NET: -500 mL          Assessment  60y Male  w/ PAST MEDICAL & SURGICAL HISTORY:  H/O pleural effusion  DX IN 2011 when pt had pneumonia  chronic condition now    Obesity    Migraines    Pneumonia  2011    Smoking hx    H/O ETOH abuse  sober x 20 yrs    Drug abuse, in remission  sober x 20 yrs    HLD (hyperlipidemia)    Schizophrenia    Skin lesion  face   &quot;pre-cancerous&quot;    Cyst  scalp excised 20 yrs ago    admitted with complaints of Patient is a 60y old  Male who presents with a chief complaint of Lung surgery (13 Jan 2021 11:45)  .On (Date), patient underwent Bronchoscopy, with lobectomy by thoracotomy using VATS  61y/o male present for preop eval for scheduled bronchoscopy possible right upper lobectomy.  Pt with c/o h/o recent pneumonia.  Imaging study done Dx with nodule in right upper lung.   (05 Jan 2021 11:31)      Procedure: R thoracotomy RUL lobectomy 1/7/2021                         Issues:              Lung nodule              Postop pain  Schizophrenia  HLD  Hx of ETOH  Hx of smoking    Pt intubated post op. Started on Zosyn for copious resp secretions-d/c'd on 1/13. Recovered well. All cultures negative. All CTs now removed. PT recommending rehab. COVID swab sent. 1/14-Still awaiting rehab. Today pt c/o pain, CXR w inc atelectasis at base.       PLAN  Neuro: Pain management. Will have nurse offer pt Oxy 10mg every 4hrs for better pain. control. Will add neurontin.   Pulm: Encourage coughing, deep breathing and use of incentive spirometry. Wean off supplemental oxygen as able. Daily CXR. Cont aggressive pulm toilet and nebs. Will repeat CXR tomorrow to assess for improvement. Will give low dose Lasix for diuresis for effusion.   Cardio: Monitor telemetry/alarms  GI: Tolerating diet. Continue stool softeners. Will give suppository again today, if no BM will try enema.   Renal: monitor urine output, supplement electrolytes as needed  Vasc: Heparin SC/SCDs for DVT prophylaxis  Heme: Stable H/H. .   ID: No fever, no Leukocytosis. Will start Keflex per Dr. Daly for atelectasis on CXR and mild erythema at CT suture sites.   Therapy: OOB/ambulate. Awaiting Rehab- will plan for discharge potentially tomorrow or after the weekend if CXR improves.   Discussed with Cardiothoracic Team at AM rounds.

## 2021-01-15 NOTE — PROGRESS NOTE ADULT - SUBJECTIVE AND OBJECTIVE BOX
Subjective: NAD    Vital Signs:  Vital Signs Last 24 Hrs  T(C): 37.3 (01-15-21 @ 06:14), Max: 37.4 (01-14-21 @ 11:44)  T(F): 99.2 (01-15-21 @ 06:14), Max: 99.4 (01-14-21 @ 11:44)  HR: 81 (01-15-21 @ 06:14) (72 - 95)  BP: 121/73 (01-15-21 @ 06:14) (106/676 - 125/77)  RR: 17 (01-15-21 @ 06:14) (16 - 18)  SpO2: 96% (01-15-21 @ 06:14) (94% - 99%) on (O2)    Telemetry/Alarms:  General: WN/WD NAD  Neurology: Awake, nonfocal, GLOVER x 4  Eyes: Scleras clear, PERRLA/ EOMI, Gross vision intact  ENT:Gross hearing intact, grossly patent pharynx, no stridor  Neck: Neck supple, trachea midline, No JVD,   Respiratory: CTA B/L, Decr BS R base. No wheezing, rales, rhonchi  CV: RRR, S1S2, no murmurs, rubs or gallops  Abdominal: Soft, NT, ND +BS,   Extremities: No edema, + peripheral pulses  Skin: No Rashes, Hematoma, Ecchymosis  Lymphatic: No Neck, axilla, groin LAD  Psych: Oriented x 3, normal affect  Incisions: c/d/i  Tubes:  Relevant labs, radiology and Medications reviewed            ABG:  CXR:  MEDICATIONS  (STANDING):  acetaminophen  IVPB .. 1000 milliGRAM(s) IV Intermittent once  albuterol/ipratropium for Nebulization 3 milliLiter(s) Nebulizer every 6 hours  atorvastatin 20 milliGRAM(s) Oral at bedtime  clonazePAM  Tablet 2 milliGRAM(s) Oral at bedtime  cloZAPine 200 milliGRAM(s) Oral at bedtime  dornase terry Solution 2.5 milliGRAM(s) Inhalation every 12 hours  famotidine    Tablet 20 milliGRAM(s) Oral every 12 hours  heparin   Injectable 7500 Unit(s) SubCutaneous every 8 hours  PARoxetine 30 milliGRAM(s) Oral daily  polyethylene glycol 3350 17 Gram(s) Oral daily  senna 2 Tablet(s) Oral at bedtime  sodium chloride 3%  Inhalation 4 milliLiter(s) Inhalation every 6 hours    MEDICATIONS  (PRN):  acetaminophen   Tablet .. 650 milliGRAM(s) Oral every 6 hours PRN Temp greater or equal to 38C (100.4F), Mild Pain (1 - 3)  bisacodyl Suppository 10 milliGRAM(s) Rectal daily PRN Constipation  naloxone Injectable 0.1 milliGRAM(s) IV Push every 3 minutes PRN For ANY of the following changes in patient status:  A. RR LESS THAN 10 breaths per minute, B. Oxygen saturation LESS THAN 90%, C. Sedation score of 6  ondansetron Injectable 4 milliGRAM(s) IV Push every 6 hours PRN Nausea  oxyCODONE    IR 10 milliGRAM(s) Oral every 4 hours PRN Severe Pain (7 - 10)  oxyCODONE    IR 5 milliGRAM(s) Oral every 4 hours PRN Moderate Pain (4 - 6)    Pertinent Physical Exam  I&O's Summary    14 Jan 2021 07:01  -  15 Ronald 2021 07:00  --------------------------------------------------------  IN: 0 mL / OUT: 500 mL / NET: -500 mL        Assessment  60y Male  w/ PAST MEDICAL & SURGICAL HISTORY:  H/O pleural effusion  DX IN 2011 when pt had pneumonia  chronic condition now    Obesity    Migraines    Pneumonia  2011    Smoking hx    H/O ETOH abuse  sober x 20 yrs    Drug abuse, in remission  sober x 20 yrs    HLD (hyperlipidemia)    Schizophrenia    Skin lesion  face   &quot;pre-cancerous&quot;    Cyst  scalp excised 20 yrs ago    admitted with complaints of Patient is a 60y old  Male who presents with a chief complaint of Lung surgery (13 Jan 2021 11:45).  On (Date), patient underwent Bronchoscopy, with lobectomy by thoracotomy using VATS. Postoperative course/issues:    PLAN  Neuro: Pain management  Pulm: Encourage coughing, deep breathing and use of incentive spirometry. Wean off supplemental oxygen as able. Daily CXR.   Cardio: Monitor telemetry/alarms  GI: Tolerating diet. Continue stool softeners.  Renal: monitor urine output, supplement electrolytes as needed  Vasc: Heparin SC/SCDs for DVT prophylaxis  Heme: Stable H/H. .   ID: Off antibiotics. Stable.  Therapy: OOB/ambulate  Disposition: Aim to D/C to Rehab vs home  Discussed with Cardiothoracic Team at AM rounds.

## 2021-01-16 LAB
ANION GAP SERPL CALC-SCNC: 13 MMOL/L — SIGNIFICANT CHANGE UP (ref 7–14)
BUN SERPL-MCNC: 16 MG/DL — SIGNIFICANT CHANGE UP (ref 7–23)
CALCIUM SERPL-MCNC: 9.1 MG/DL — SIGNIFICANT CHANGE UP (ref 8.4–10.5)
CHLORIDE SERPL-SCNC: 101 MMOL/L — SIGNIFICANT CHANGE UP (ref 98–107)
CO2 SERPL-SCNC: 25 MMOL/L — SIGNIFICANT CHANGE UP (ref 22–31)
CREAT SERPL-MCNC: 1.26 MG/DL — SIGNIFICANT CHANGE UP (ref 0.5–1.3)
GLUCOSE SERPL-MCNC: 92 MG/DL — SIGNIFICANT CHANGE UP (ref 70–99)
HCT VFR BLD CALC: 29 % — LOW (ref 39–50)
HGB BLD-MCNC: 9.6 G/DL — LOW (ref 13–17)
MCHC RBC-ENTMCNC: 30.6 PG — SIGNIFICANT CHANGE UP (ref 27–34)
MCHC RBC-ENTMCNC: 33.1 GM/DL — SIGNIFICANT CHANGE UP (ref 32–36)
MCV RBC AUTO: 92.4 FL — SIGNIFICANT CHANGE UP (ref 80–100)
NRBC # BLD: 0 /100 WBCS — SIGNIFICANT CHANGE UP
NRBC # FLD: 0 K/UL — SIGNIFICANT CHANGE UP
PLATELET # BLD AUTO: 207 K/UL — SIGNIFICANT CHANGE UP (ref 150–400)
POTASSIUM SERPL-MCNC: 3.7 MMOL/L — SIGNIFICANT CHANGE UP (ref 3.5–5.3)
POTASSIUM SERPL-SCNC: 3.7 MMOL/L — SIGNIFICANT CHANGE UP (ref 3.5–5.3)
RBC # BLD: 3.14 M/UL — LOW (ref 4.2–5.8)
RBC # FLD: 13.5 % — SIGNIFICANT CHANGE UP (ref 10.3–14.5)
SODIUM SERPL-SCNC: 139 MMOL/L — SIGNIFICANT CHANGE UP (ref 135–145)
WBC # BLD: 4.87 K/UL — SIGNIFICANT CHANGE UP (ref 3.8–10.5)
WBC # FLD AUTO: 4.87 K/UL — SIGNIFICANT CHANGE UP (ref 3.8–10.5)

## 2021-01-16 PROCEDURE — 71045 X-RAY EXAM CHEST 1 VIEW: CPT | Mod: 26

## 2021-01-16 RX ORDER — FUROSEMIDE 40 MG
20 TABLET ORAL ONCE
Refills: 0 | Status: COMPLETED | OUTPATIENT
Start: 2021-01-16 | End: 2021-01-16

## 2021-01-16 RX ORDER — LIDOCAINE 4 G/100G
1 CREAM TOPICAL DAILY
Refills: 0 | Status: COMPLETED | OUTPATIENT
Start: 2021-01-16 | End: 2021-01-17

## 2021-01-16 RX ORDER — KETOROLAC TROMETHAMINE 30 MG/ML
15 SYRINGE (ML) INJECTION ONCE
Refills: 0 | Status: DISCONTINUED | OUTPATIENT
Start: 2021-01-16 | End: 2021-01-16

## 2021-01-16 RX ORDER — ACETAMINOPHEN 500 MG
1000 TABLET ORAL ONCE
Refills: 0 | Status: COMPLETED | OUTPATIENT
Start: 2021-01-16 | End: 2021-01-16

## 2021-01-16 RX ORDER — DORNASE ALFA 1 MG/ML
2.5 SOLUTION RESPIRATORY (INHALATION) DAILY
Refills: 0 | Status: COMPLETED | OUTPATIENT
Start: 2021-01-16 | End: 2021-01-18

## 2021-01-16 RX ADMIN — GABAPENTIN 200 MILLIGRAM(S): 400 CAPSULE ORAL at 12:20

## 2021-01-16 RX ADMIN — SODIUM CHLORIDE 4 MILLILITER(S): 9 INJECTION INTRAMUSCULAR; INTRAVENOUS; SUBCUTANEOUS at 17:36

## 2021-01-16 RX ADMIN — SODIUM CHLORIDE 4 MILLILITER(S): 9 INJECTION INTRAMUSCULAR; INTRAVENOUS; SUBCUTANEOUS at 22:47

## 2021-01-16 RX ADMIN — Medication 3 MILLILITER(S): at 04:32

## 2021-01-16 RX ADMIN — Medication 30 MILLIGRAM(S): at 11:06

## 2021-01-16 RX ADMIN — SENNA PLUS 2 TABLET(S): 8.6 TABLET ORAL at 23:20

## 2021-01-16 RX ADMIN — Medication 500 MILLIGRAM(S): at 05:16

## 2021-01-16 RX ADMIN — CLOZAPINE 200 MILLIGRAM(S): 150 TABLET, ORALLY DISINTEGRATING ORAL at 23:20

## 2021-01-16 RX ADMIN — FAMOTIDINE 20 MILLIGRAM(S): 10 INJECTION INTRAVENOUS at 05:17

## 2021-01-16 RX ADMIN — Medication 500 MILLIGRAM(S): at 11:05

## 2021-01-16 RX ADMIN — Medication 15 MILLIGRAM(S): at 12:20

## 2021-01-16 RX ADMIN — GABAPENTIN 200 MILLIGRAM(S): 400 CAPSULE ORAL at 23:20

## 2021-01-16 RX ADMIN — Medication 20 MILLIGRAM(S): at 12:20

## 2021-01-16 RX ADMIN — Medication 3 MILLILITER(S): at 22:47

## 2021-01-16 RX ADMIN — SODIUM CHLORIDE 4 MILLILITER(S): 9 INJECTION INTRAMUSCULAR; INTRAVENOUS; SUBCUTANEOUS at 04:33

## 2021-01-16 RX ADMIN — GABAPENTIN 200 MILLIGRAM(S): 400 CAPSULE ORAL at 05:16

## 2021-01-16 RX ADMIN — HEPARIN SODIUM 7500 UNIT(S): 5000 INJECTION INTRAVENOUS; SUBCUTANEOUS at 12:20

## 2021-01-16 RX ADMIN — ATORVASTATIN CALCIUM 20 MILLIGRAM(S): 80 TABLET, FILM COATED ORAL at 23:20

## 2021-01-16 RX ADMIN — OXYCODONE HYDROCHLORIDE 10 MILLIGRAM(S): 5 TABLET ORAL at 23:21

## 2021-01-16 RX ADMIN — SODIUM CHLORIDE 4 MILLILITER(S): 9 INJECTION INTRAMUSCULAR; INTRAVENOUS; SUBCUTANEOUS at 11:11

## 2021-01-16 RX ADMIN — Medication 400 MILLIGRAM(S): at 12:20

## 2021-01-16 RX ADMIN — Medication 500 MILLIGRAM(S): at 23:20

## 2021-01-16 RX ADMIN — Medication 2 MILLIGRAM(S): at 23:20

## 2021-01-16 RX ADMIN — FAMOTIDINE 20 MILLIGRAM(S): 10 INJECTION INTRAVENOUS at 17:20

## 2021-01-16 RX ADMIN — OXYCODONE HYDROCHLORIDE 10 MILLIGRAM(S): 5 TABLET ORAL at 10:00

## 2021-01-16 RX ADMIN — HEPARIN SODIUM 7500 UNIT(S): 5000 INJECTION INTRAVENOUS; SUBCUTANEOUS at 23:23

## 2021-01-16 RX ADMIN — Medication 500 MILLIGRAM(S): at 17:19

## 2021-01-16 RX ADMIN — Medication 3 MILLILITER(S): at 17:36

## 2021-01-16 RX ADMIN — Medication 3 MILLILITER(S): at 11:10

## 2021-01-16 RX ADMIN — HEPARIN SODIUM 7500 UNIT(S): 5000 INJECTION INTRAVENOUS; SUBCUTANEOUS at 05:16

## 2021-01-16 NOTE — PROGRESS NOTE ADULT - SUBJECTIVE AND OBJECTIVE BOX
Subjective: 61 y/o male, found supine in bed this AM. Seen at bedside and currently in NAD. Today pt explains that he is comfortable but has been having a cough. Overnight he did not require supplemental O2 but he also has not ambulated. Pain right now is stable while resting. Of note, patient did finally have a BM.     Vital Signs:  Vital Signs Last 24 Hrs  T(C): 36.3 (01-16-21 @ 05:14), Max: 37.1 (01-15-21 @ 09:54)  T(F): 97.3 (01-16-21 @ 05:14), Max: 98.8 (01-15-21 @ 09:54)  HR: 80 (01-16-21 @ 05:14) (72 - 89)  BP: 99/56 (01-16-21 @ 05:14) (99/56 - 115/70)  RR: 18 (01-16-21 @ 05:14) (18 - 18)  SpO2: 100% (01-16-21 @ 05:14) (95% - 100%) on (O2)    Pertinent Physical Exam:  Telemetry/Alarms: NSR  General: WN/WD NAD  Neurology: Awake  Respiratory: b/l rhonchi and diminished lung sounds R>L, pt not taking deep enough breaths  CV: RRR  Skin: No Rashes, Hematoma, Ecchymosis  Incisions: Right sided surgical chest tube site is erythematous but no fluctuance or drainage noted.  Tubes: out      I&O's Summary    15 Ronald 2021 07:01  -  16 Jan 2021 07:00  --------------------------------------------------------  IN: 120 mL / OUT: 920 mL / NET: -800 mL        Relevant labs, radiology and Medications reviewed    CXR 1/16 Pending and will review later today  CXR: 1/15  < from: Xray Chest 1 View- PORTABLE-Urgent (Xray Chest 1 View- PORTABLE-Urgent .) (01.15.21 @ 11:20) >      INTERPRETATION:  Increased right lung opacification/effusion.    Follow up official report.    < end of copied text >                         9.6    4.87  )-----------( 207      ( 16 Jan 2021 06:44 )             29.0     01-16    139  |  101  |  16  ----------------------------<  92  3.7   |  25  |  1.26    Ca    9.1      16 Jan 2021 06:44        MEDICATIONS  (STANDING):  acetaminophen  IVPB .. 1000 milliGRAM(s) IV Intermittent once  albuterol/ipratropium for Nebulization 3 milliLiter(s) Nebulizer every 6 hours  atorvastatin 20 milliGRAM(s) Oral at bedtime  cephalexin 500 milliGRAM(s) Oral four times a day  clonazePAM  Tablet 2 milliGRAM(s) Oral at bedtime  cloZAPine 200 milliGRAM(s) Oral at bedtime  famotidine    Tablet 20 milliGRAM(s) Oral every 12 hours  gabapentin 200 milliGRAM(s) Oral every 8 hours  heparin   Injectable 7500 Unit(s) SubCutaneous every 8 hours  PARoxetine 30 milliGRAM(s) Oral daily  polyethylene glycol 3350 17 Gram(s) Oral daily  saline laxative (FLEET) Rectal Enema 1 Enema Rectal once  senna 2 Tablet(s) Oral at bedtime  sodium chloride 3%  Inhalation 4 milliLiter(s) Inhalation every 6 hours    MEDICATIONS  (PRN):  acetaminophen   Tablet .. 650 milliGRAM(s) Oral every 6 hours PRN Temp greater or equal to 38C (100.4F), Mild Pain (1 - 3)  bisacodyl Suppository 10 milliGRAM(s) Rectal daily PRN Constipation  oxyCODONE    IR 5 milliGRAM(s) Oral every 4 hours PRN Moderate Pain (4 - 6)  oxyCODONE    IR 10 milliGRAM(s) Oral every 4 hours PRN Severe Pain (7 - 10)      Assessment  60y old  Male who presents with h/o schizophrenia, HLD, ETOH, smoker with recent pneumonia and imaging showing right upper lung. Now s/p R thoracotomy RUL lobectomy 1/7/2021                       Post op patient returned to ICU intubated and was started on Zosyn for copious resp secretions-d/c'd on 1/13. Recovered well and now extubated. All cultures negative. All CTs now removed. PT recommending rehab. COVID swab sent and negative. 1/15 pt c/o pain, CXR w inc atelectasis at base. 1/16 started on course of keflex for possible infected chest tube site, cont pulm toileting and chest PT.        PLAN  Neuro: Pain management with PO pain meds. Assess pain today and consider adjusting if patient not well controlled.  Pulm: Encourage coughing, deep breathing and use of incentive spirometry and chest PT. Wean off supplemental oxygen as able. Daily CXR. Cont with Duonebs, sodium inhalation. Consider Pulmozyme. Inct atelactesis on CXR review today and assess for possible pna  Cardio: Monitor telemetry/alarms. Cont with statin  GI: Tolerating diet. Continue stool softeners. Had BM yesterday.   Renal: monitor urine output, supplement electrolytes as needed  Vasc: Heparin SC/SCDs for DVT prophylaxis  Heme: Stable H/H.   ID: 10 day course of keflex started for chest tube site. No White count and afebrile at this point. Cont monitoring.   Therapy: OOB/ambulate and daily Chest PT  Disposition: COnt with Pulm toileting and monitor for infected chest tube site. Pending rehab acceptance and possible d/c early next week.   Discussed with Cardiothoracic Team at AM rounds.

## 2021-01-17 LAB
ALBUMIN SERPL ELPH-MCNC: 3.4 G/DL — SIGNIFICANT CHANGE UP (ref 3.3–5)
ALP SERPL-CCNC: 77 U/L — SIGNIFICANT CHANGE UP (ref 40–120)
ALT FLD-CCNC: 28 U/L — SIGNIFICANT CHANGE UP (ref 4–41)
ANION GAP SERPL CALC-SCNC: 11 MMOL/L — SIGNIFICANT CHANGE UP (ref 7–14)
AST SERPL-CCNC: 33 U/L — SIGNIFICANT CHANGE UP (ref 4–40)
BILIRUB SERPL-MCNC: 0.7 MG/DL — SIGNIFICANT CHANGE UP (ref 0.2–1.2)
BUN SERPL-MCNC: 17 MG/DL — SIGNIFICANT CHANGE UP (ref 7–23)
CALCIUM SERPL-MCNC: 8.9 MG/DL — SIGNIFICANT CHANGE UP (ref 8.4–10.5)
CHLORIDE SERPL-SCNC: 101 MMOL/L — SIGNIFICANT CHANGE UP (ref 98–107)
CO2 SERPL-SCNC: 25 MMOL/L — SIGNIFICANT CHANGE UP (ref 22–31)
CREAT SERPL-MCNC: 1.33 MG/DL — HIGH (ref 0.5–1.3)
GLUCOSE SERPL-MCNC: 94 MG/DL — SIGNIFICANT CHANGE UP (ref 70–99)
HCT VFR BLD CALC: 28 % — LOW (ref 39–50)
HGB BLD-MCNC: 9.3 G/DL — LOW (ref 13–17)
MCHC RBC-ENTMCNC: 30.3 PG — SIGNIFICANT CHANGE UP (ref 27–34)
MCHC RBC-ENTMCNC: 33.2 GM/DL — SIGNIFICANT CHANGE UP (ref 32–36)
MCV RBC AUTO: 91.2 FL — SIGNIFICANT CHANGE UP (ref 80–100)
NRBC # BLD: 0 /100 WBCS — SIGNIFICANT CHANGE UP
NRBC # FLD: 0 K/UL — SIGNIFICANT CHANGE UP
PLATELET # BLD AUTO: 225 K/UL — SIGNIFICANT CHANGE UP (ref 150–400)
POTASSIUM SERPL-MCNC: 3.5 MMOL/L — SIGNIFICANT CHANGE UP (ref 3.5–5.3)
POTASSIUM SERPL-SCNC: 3.5 MMOL/L — SIGNIFICANT CHANGE UP (ref 3.5–5.3)
PROT SERPL-MCNC: 6.2 G/DL — SIGNIFICANT CHANGE UP (ref 6–8.3)
RBC # BLD: 3.07 M/UL — LOW (ref 4.2–5.8)
RBC # FLD: 13.4 % — SIGNIFICANT CHANGE UP (ref 10.3–14.5)
SODIUM SERPL-SCNC: 137 MMOL/L — SIGNIFICANT CHANGE UP (ref 135–145)
WBC # BLD: 4.33 K/UL — SIGNIFICANT CHANGE UP (ref 3.8–10.5)
WBC # FLD AUTO: 4.33 K/UL — SIGNIFICANT CHANGE UP (ref 3.8–10.5)

## 2021-01-17 PROCEDURE — 71045 X-RAY EXAM CHEST 1 VIEW: CPT | Mod: 26

## 2021-01-17 RX ADMIN — GABAPENTIN 200 MILLIGRAM(S): 400 CAPSULE ORAL at 12:17

## 2021-01-17 RX ADMIN — ATORVASTATIN CALCIUM 20 MILLIGRAM(S): 80 TABLET, FILM COATED ORAL at 22:17

## 2021-01-17 RX ADMIN — HEPARIN SODIUM 7500 UNIT(S): 5000 INJECTION INTRAVENOUS; SUBCUTANEOUS at 06:01

## 2021-01-17 RX ADMIN — Medication 3 MILLILITER(S): at 04:19

## 2021-01-17 RX ADMIN — SODIUM CHLORIDE 4 MILLILITER(S): 9 INJECTION INTRAMUSCULAR; INTRAVENOUS; SUBCUTANEOUS at 16:59

## 2021-01-17 RX ADMIN — Medication 3 MILLILITER(S): at 16:59

## 2021-01-17 RX ADMIN — OXYCODONE HYDROCHLORIDE 10 MILLIGRAM(S): 5 TABLET ORAL at 11:37

## 2021-01-17 RX ADMIN — OXYCODONE HYDROCHLORIDE 10 MILLIGRAM(S): 5 TABLET ORAL at 22:17

## 2021-01-17 RX ADMIN — POLYETHYLENE GLYCOL 3350 17 GRAM(S): 17 POWDER, FOR SOLUTION ORAL at 11:38

## 2021-01-17 RX ADMIN — Medication 500 MILLIGRAM(S): at 11:37

## 2021-01-17 RX ADMIN — Medication 500 MILLIGRAM(S): at 06:01

## 2021-01-17 RX ADMIN — Medication 3 MILLILITER(S): at 23:00

## 2021-01-17 RX ADMIN — GABAPENTIN 200 MILLIGRAM(S): 400 CAPSULE ORAL at 22:17

## 2021-01-17 RX ADMIN — LIDOCAINE 1 PATCH: 4 CREAM TOPICAL at 11:38

## 2021-01-17 RX ADMIN — LIDOCAINE 1 PATCH: 4 CREAM TOPICAL at 22:17

## 2021-01-17 RX ADMIN — Medication 500 MILLIGRAM(S): at 22:19

## 2021-01-17 RX ADMIN — Medication 30 MILLIGRAM(S): at 11:38

## 2021-01-17 RX ADMIN — HEPARIN SODIUM 7500 UNIT(S): 5000 INJECTION INTRAVENOUS; SUBCUTANEOUS at 12:17

## 2021-01-17 RX ADMIN — SODIUM CHLORIDE 4 MILLILITER(S): 9 INJECTION INTRAMUSCULAR; INTRAVENOUS; SUBCUTANEOUS at 23:00

## 2021-01-17 RX ADMIN — GABAPENTIN 200 MILLIGRAM(S): 400 CAPSULE ORAL at 06:01

## 2021-01-17 RX ADMIN — Medication 2 MILLIGRAM(S): at 22:17

## 2021-01-17 RX ADMIN — SENNA PLUS 2 TABLET(S): 8.6 TABLET ORAL at 22:16

## 2021-01-17 RX ADMIN — FAMOTIDINE 20 MILLIGRAM(S): 10 INJECTION INTRAVENOUS at 06:01

## 2021-01-17 RX ADMIN — LIDOCAINE 1 PATCH: 4 CREAM TOPICAL at 20:19

## 2021-01-17 RX ADMIN — Medication 500 MILLIGRAM(S): at 19:23

## 2021-01-17 RX ADMIN — FAMOTIDINE 20 MILLIGRAM(S): 10 INJECTION INTRAVENOUS at 19:23

## 2021-01-17 RX ADMIN — CLOZAPINE 200 MILLIGRAM(S): 150 TABLET, ORALLY DISINTEGRATING ORAL at 22:16

## 2021-01-17 RX ADMIN — SODIUM CHLORIDE 4 MILLILITER(S): 9 INJECTION INTRAMUSCULAR; INTRAVENOUS; SUBCUTANEOUS at 04:20

## 2021-01-17 NOTE — PROGRESS NOTE ADULT - SUBJECTIVE AND OBJECTIVE BOX
Subjective: "I'm feeling better. my pain is better" Pt states pain more controlled. Sleepy this morning, but woken up and put into chair. Pt with dry cough. Using IS to 750-1000. Pt happy he had a bowel movement yesterday.     Vital Signs:  Vital Signs Last 24 Hrs  T(C): 36.9 (01-17-21 @ 05:56), Max: 37.6 (01-16-21 @ 09:49)  T(F): 98.5 (01-17-21 @ 05:56), Max: 99.6 (01-16-21 @ 09:49)  HR: 82 (01-17-21 @ 05:56) (76 - 88)  BP: 95/50 (01-17-21 @ 05:56) (95/50 - 109/71)  RR: 18 (01-17-21 @ 05:56) (18 - 18)  SpO2: 99% (01-17-21 @ 05:56) (94% - 99%) on (O2)    Telemetry/Alarms:  General: WN/WD NAD  Neurology: Awake, nonfocal, GLOVER x 4  Eyes: Scleras clear, PERRLA/ EOMI, Gross vision intact  ENT:Gross hearing intact, grossly patent pharynx, no stridor  Neck: Neck supple, trachea midline, No JVD,   Respiratory: CTA B/L, slight dec at rt. base. Improved from 2 days ago.   CV: RRR, S1S2, no murmurs, rubs or gallops  Abdominal: Soft, NT, ND +BS, +BM yesterday. Voiding.   Extremities: No edema, + peripheral pulses  Skin: No Rashes, Hematoma, Ecchymosis  Lymphatic: No Neck, axilla, groin LAD  Psych: Oriented x 3, normal affect  Incisions: Rt. Thoracotomy c/d/i. Sutures sites c/d/i, no erythema  Tubes:  Relevant labs, radiology and Medications reviewed  Awaiting CXR for today. Previous days CXR showing Rt. Base atelectasis w sml effusion.                         9.3    4.33  )-----------( 225      ( 17 Jan 2021 05:40 )             28.0     01-17    137  |  101  |  17  ----------------------------<  94  3.5   |  25  |  1.33<H>    Ca    8.9      17 Jan 2021 05:40    TPro  6.2  /  Alb  3.4  /  TBili  0.7  /  DBili  x   /  AST  33  /  ALT  28  /  AlkPhos  77  01-17      MEDICATIONS  (STANDING):  acetaminophen  IVPB .. 1000 milliGRAM(s) IV Intermittent once  albuterol/ipratropium for Nebulization 3 milliLiter(s) Nebulizer every 6 hours  atorvastatin 20 milliGRAM(s) Oral at bedtime  cephalexin 500 milliGRAM(s) Oral four times a day  clonazePAM  Tablet 2 milliGRAM(s) Oral at bedtime  cloZAPine 200 milliGRAM(s) Oral at bedtime  dornase terry Solution 2.5 milliGRAM(s) Inhalation daily  famotidine    Tablet 20 milliGRAM(s) Oral every 12 hours  gabapentin 200 milliGRAM(s) Oral every 8 hours  heparin   Injectable 7500 Unit(s) SubCutaneous every 8 hours  lidocaine   Patch 1 Patch Transdermal daily  PARoxetine 30 milliGRAM(s) Oral daily  polyethylene glycol 3350 17 Gram(s) Oral daily  saline laxative (FLEET) Rectal Enema 1 Enema Rectal once  senna 2 Tablet(s) Oral at bedtime  sodium chloride 3%  Inhalation 4 milliLiter(s) Inhalation every 6 hours    MEDICATIONS  (PRN):  acetaminophen   Tablet .. 650 milliGRAM(s) Oral every 6 hours PRN Temp greater or equal to 38C (100.4F), Mild Pain (1 - 3)  bisacodyl Suppository 10 milliGRAM(s) Rectal daily PRN Constipation  oxyCODONE    IR 5 milliGRAM(s) Oral every 4 hours PRN Moderate Pain (4 - 6)  oxyCODONE    IR 10 milliGRAM(s) Oral every 4 hours PRN Severe Pain (7 - 10)    Pertinent Physical Exam  I&O's Summary    16 Jan 2021 07:01  -  17 Jan 2021 07:00  --------------------------------------------------------  IN: 480 mL / OUT: 1400 mL / NET: -920 mL          Assessment  60y Male  w/ PAST MEDICAL & SURGICAL HISTORY:  H/O pleural effusion  DX IN 2011 when pt had pneumonia  chronic condition now    Obesity    Migraines    Pneumonia  2011    Smoking hx    H/O ETOH abuse  sober x 20 yrs    Drug abuse, in remission  sober x 20 yrs    HLD (hyperlipidemia)    Schizophrenia    Skin lesion  face   &quot;pre-cancerous&quot;    Cyst  scalp excised 20 yrs ago    admitted with complaints of Patient is a 60y old  Male who presents with a chief complaint of Lung Surgery (16 Jan 2021 08:17)  .59y/o male present for preop eval for scheduled bronchoscopy possible right upper lobectomy.  Pt with c/o h/o recent pneumonia.  Imaging study done Dx with nodule in right upper lung.   (05 Jan 2021 11:31)      Procedure: R thoracotomy RUL lobectomy 1/7/2021                         Issues:              Lung nodule              Postop pain  Schizophrenia  HLD  Hx of ETOH  Hx of smoking    Pt intubated post op. Started on Zosyn for copious resp secretions-d/c'd on 1/13. Recovered well. All cultures negative. All CTs now removed. PT recommending rehab. COVID swab sent. 1/14-Still awaiting rehab.1/15- pt c/o pain, CXR w inc atelectasis at base. needing Aggressive Pulm toilet and bowel regiment. Given Lasix for diuresis of effusion. Keflex started for erythema at CT site sutures 1/16-CXR essentially unchanged. Continued pain management and Pulm toilet. No Significant effusion on u/s. Given Lasix again.     PLAN  Neuro: Pain management. Cont current regiment. Pt states and appears more comfortable.   Pulm: Encourage coughing, deep breathing and use of incentive spirometry. Cont Nebs/chest PT. Cont OOB. Daily CXR.   Cardio: Monitor telemetry/alarms  GI: Tolerating diet. Continue stool softeners.  Renal: monitor urine output, supplement electrolytes as needed. Creatinine slightly inc'd to 1.33. Will hold off on more Toradol or lasix at this time. I/O negative almost 1 liter post diuresis.   Vasc: Heparin SC/SCDs for DVT prophylaxis  Heme: Stable H/H. .   ID: Cont Keflex x 10 day course for erythema at CT site. Erythema resolved at this time. No leukocytosis or fever.   Therapy: OOB/ambulate. Continue ambulation  Disposition: Aim to D/C to Rehab on Tuesday. Will need rpt COVID Swab  Discussed with Cardiothoracic Team at AM rounds.

## 2021-01-18 LAB
ANION GAP SERPL CALC-SCNC: 12 MMOL/L — SIGNIFICANT CHANGE UP (ref 7–14)
BUN SERPL-MCNC: 15 MG/DL — SIGNIFICANT CHANGE UP (ref 7–23)
CALCIUM SERPL-MCNC: 9 MG/DL — SIGNIFICANT CHANGE UP (ref 8.4–10.5)
CHLORIDE SERPL-SCNC: 103 MMOL/L — SIGNIFICANT CHANGE UP (ref 98–107)
CO2 SERPL-SCNC: 23 MMOL/L — SIGNIFICANT CHANGE UP (ref 22–31)
CREAT SERPL-MCNC: 1.18 MG/DL — SIGNIFICANT CHANGE UP (ref 0.5–1.3)
GLUCOSE SERPL-MCNC: 91 MG/DL — SIGNIFICANT CHANGE UP (ref 70–99)
POTASSIUM SERPL-MCNC: 3.8 MMOL/L — SIGNIFICANT CHANGE UP (ref 3.5–5.3)
POTASSIUM SERPL-SCNC: 3.8 MMOL/L — SIGNIFICANT CHANGE UP (ref 3.5–5.3)
SARS-COV-2 RNA SPEC QL NAA+PROBE: SIGNIFICANT CHANGE UP
SODIUM SERPL-SCNC: 138 MMOL/L — SIGNIFICANT CHANGE UP (ref 135–145)

## 2021-01-18 PROCEDURE — 71045 X-RAY EXAM CHEST 1 VIEW: CPT | Mod: 26

## 2021-01-18 RX ORDER — IBUPROFEN 200 MG
400 TABLET ORAL EVERY 6 HOURS
Refills: 0 | Status: DISCONTINUED | OUTPATIENT
Start: 2021-01-18 | End: 2021-01-19

## 2021-01-18 RX ORDER — POTASSIUM CHLORIDE 20 MEQ
10 PACKET (EA) ORAL ONCE
Refills: 0 | Status: COMPLETED | OUTPATIENT
Start: 2021-01-18 | End: 2021-01-18

## 2021-01-18 RX ORDER — OXYCODONE HYDROCHLORIDE 5 MG/1
5 TABLET ORAL
Refills: 0 | Status: DISCONTINUED | OUTPATIENT
Start: 2021-01-18 | End: 2021-01-19

## 2021-01-18 RX ORDER — FUROSEMIDE 40 MG
20 TABLET ORAL ONCE
Refills: 0 | Status: COMPLETED | OUTPATIENT
Start: 2021-01-18 | End: 2021-01-18

## 2021-01-18 RX ADMIN — SODIUM CHLORIDE 4 MILLILITER(S): 9 INJECTION INTRAMUSCULAR; INTRAVENOUS; SUBCUTANEOUS at 10:13

## 2021-01-18 RX ADMIN — Medication 3 MILLILITER(S): at 22:17

## 2021-01-18 RX ADMIN — Medication 400 MILLIGRAM(S): at 18:02

## 2021-01-18 RX ADMIN — SODIUM CHLORIDE 4 MILLILITER(S): 9 INJECTION INTRAMUSCULAR; INTRAVENOUS; SUBCUTANEOUS at 22:17

## 2021-01-18 RX ADMIN — FAMOTIDINE 20 MILLIGRAM(S): 10 INJECTION INTRAVENOUS at 16:25

## 2021-01-18 RX ADMIN — ATORVASTATIN CALCIUM 20 MILLIGRAM(S): 80 TABLET, FILM COATED ORAL at 21:19

## 2021-01-18 RX ADMIN — Medication 3 MILLILITER(S): at 16:35

## 2021-01-18 RX ADMIN — Medication 20 MILLIGRAM(S): at 11:43

## 2021-01-18 RX ADMIN — Medication 10 MILLIEQUIVALENT(S): at 16:25

## 2021-01-18 RX ADMIN — POLYETHYLENE GLYCOL 3350 17 GRAM(S): 17 POWDER, FOR SOLUTION ORAL at 11:43

## 2021-01-18 RX ADMIN — DORNASE ALFA 2.5 MILLIGRAM(S): 1 SOLUTION RESPIRATORY (INHALATION) at 10:30

## 2021-01-18 RX ADMIN — GABAPENTIN 200 MILLIGRAM(S): 400 CAPSULE ORAL at 04:49

## 2021-01-18 RX ADMIN — OXYCODONE HYDROCHLORIDE 5 MILLIGRAM(S): 5 TABLET ORAL at 21:21

## 2021-01-18 RX ADMIN — CLOZAPINE 200 MILLIGRAM(S): 150 TABLET, ORALLY DISINTEGRATING ORAL at 21:19

## 2021-01-18 RX ADMIN — SODIUM CHLORIDE 4 MILLILITER(S): 9 INJECTION INTRAMUSCULAR; INTRAVENOUS; SUBCUTANEOUS at 04:00

## 2021-01-18 RX ADMIN — Medication 3 MILLILITER(S): at 04:00

## 2021-01-18 RX ADMIN — GABAPENTIN 200 MILLIGRAM(S): 400 CAPSULE ORAL at 12:55

## 2021-01-18 RX ADMIN — Medication 500 MILLIGRAM(S): at 16:25

## 2021-01-18 RX ADMIN — Medication 3 MILLILITER(S): at 10:12

## 2021-01-18 RX ADMIN — Medication 500 MILLIGRAM(S): at 11:43

## 2021-01-18 RX ADMIN — OXYCODONE HYDROCHLORIDE 10 MILLIGRAM(S): 5 TABLET ORAL at 08:31

## 2021-01-18 RX ADMIN — Medication 500 MILLIGRAM(S): at 04:49

## 2021-01-18 RX ADMIN — Medication 30 MILLIGRAM(S): at 11:43

## 2021-01-18 RX ADMIN — FAMOTIDINE 20 MILLIGRAM(S): 10 INJECTION INTRAVENOUS at 04:49

## 2021-01-18 RX ADMIN — Medication 500 MILLIGRAM(S): at 23:08

## 2021-01-18 RX ADMIN — Medication 2 MILLIGRAM(S): at 21:19

## 2021-01-18 RX ADMIN — SODIUM CHLORIDE 4 MILLILITER(S): 9 INJECTION INTRAMUSCULAR; INTRAVENOUS; SUBCUTANEOUS at 16:35

## 2021-01-18 RX ADMIN — SENNA PLUS 2 TABLET(S): 8.6 TABLET ORAL at 21:19

## 2021-01-18 NOTE — PROGRESS NOTE ADULT - SUBJECTIVE AND OBJECTIVE BOX
Subjective: "I feel ok, my incision hurts this morning though" Pt using IS to 1000. Given Chest PT. Amb w assist in hallway. OOB to chair. No n/v no CP or SOB. Off oxygen.     Vital Signs:  Vital Signs Last 24 Hrs  T(C): 37.2 (01-18-21 @ 08:21), Max: 37.5 (01-18-21 @ 04:44)  T(F): 99 (01-18-21 @ 08:21), Max: 99.5 (01-18-21 @ 04:44)  HR: 83 (01-18-21 @ 08:21) (72 - 84)  BP: 99/69 (01-18-21 @ 08:21) (99/69 - 115/68)  RR: 18 (01-18-21 @ 08:21) (18 - 18)  SpO2: 98% (01-18-21 @ 08:21) (94% - 99%) on (O2)    Telemetry/Alarms:  General: WN/WD NAD  Neurology: Awake, nonfocal, GLOVER x 4  Eyes: Scleras clear, PERRLA/ EOMI, Gross vision intact  ENT:Gross hearing intact, grossly patent pharynx, no stridor  Neck: Neck supple, trachea midline, No JVD,   Respiratory: Slight Dec BS to Rt. base  CV: RRR, S1S2, no murmurs, rubs or gallops  Abdominal: Soft, NT, ND +BS, +BM 2 days ago.   Extremities: No edema, + peripheral pulses  Skin: No Rashes, Hematoma, Ecchymosis  Lymphatic: No Neck, axilla, groin LAD  Psych: Oriented x 3, normal affect  Incisions: Rt. Thoracotomy c/d/i. Old CT sites w sutures minimal erythema. C/d/i. No s/s of infection.   Tubes:  Relevant labs, radiology and Medications reviewed         CXR-Sml Rt Base effusion/atelectasis.                9.3    4.33  )-----------( 225      ( 17 Jan 2021 05:40 )             28.0     01-18    138  |  103  |  15  ----------------------------<  91  3.8   |  23  |  1.18    Ca    9.0      18 Jan 2021 06:58    TPro  6.2  /  Alb  3.4  /  TBili  0.7  /  DBili  x   /  AST  33  /  ALT  28  /  AlkPhos  77  01-17      MEDICATIONS  (STANDING):  acetaminophen  IVPB .. 1000 milliGRAM(s) IV Intermittent once  albuterol/ipratropium for Nebulization 3 milliLiter(s) Nebulizer every 6 hours  atorvastatin 20 milliGRAM(s) Oral at bedtime  cephalexin 500 milliGRAM(s) Oral four times a day  clonazePAM  Tablet 2 milliGRAM(s) Oral at bedtime  cloZAPine 200 milliGRAM(s) Oral at bedtime  dornase terry Solution 2.5 milliGRAM(s) Inhalation daily  famotidine    Tablet 20 milliGRAM(s) Oral every 12 hours  gabapentin 200 milliGRAM(s) Oral every 8 hours  heparin   Injectable 7500 Unit(s) SubCutaneous every 8 hours  PARoxetine 30 milliGRAM(s) Oral daily  polyethylene glycol 3350 17 Gram(s) Oral daily  saline laxative (FLEET) Rectal Enema 1 Enema Rectal once  senna 2 Tablet(s) Oral at bedtime  sodium chloride 3%  Inhalation 4 milliLiter(s) Inhalation every 6 hours    MEDICATIONS  (PRN):  acetaminophen   Tablet .. 650 milliGRAM(s) Oral every 6 hours PRN Temp greater or equal to 38C (100.4F), Mild Pain (1 - 3)  bisacodyl Suppository 10 milliGRAM(s) Rectal daily PRN Constipation  oxyCODONE    IR 5 milliGRAM(s) Oral every 4 hours PRN Moderate Pain (4 - 6)  oxyCODONE    IR 10 milliGRAM(s) Oral every 4 hours PRN Severe Pain (7 - 10)    Pertinent Physical Exam  I&O's Summary    17 Jan 2021 07:01  -  18 Jan 2021 07:00  --------------------------------------------------------  IN: 0 mL / OUT: 300 mL / NET: -300 mL        Assessment  60y Male  w/ PAST MEDICAL & SURGICAL HISTORY:  H/O pleural effusion  DX IN 2011 when pt had pneumonia  chronic condition now    Obesity    Migraines    Pneumonia  2011    Smoking hx    H/O ETOH abuse  sober x 20 yrs    Drug abuse, in remission  sober x 20 yrs    HLD (hyperlipidemia)    Schizophrenia    Skin lesion  face   &quot;pre-cancerous&quot;    Cyst  scalp excised 20 yrs ago  .61y/o male present for preop eval for scheduled bronchoscopy possible right upper lobectomy.  Pt with c/o h/o recent pneumonia.  Imaging study done Dx with nodule in right upper lung.   (05 Jan 2021 11:31)      Procedure: R thoracotomy RUL lobectomy 1/7/2021                         Issues:              Lung nodule              Postop pain  Schizophrenia  HLD  Hx of ETOH  Hx of smoking    Pt intubated post op. Started on Zosyn for copious resp secretions-d/c'd on 1/13. Recovered well. All cultures negative. All CTs now removed. PT recommending rehab. COVID swab sent. 1/14-Still awaiting rehab.1/15- pt c/o pain, CXR w inc atelectasis at base. needing Aggressive Pulm toilet and bowel regiment. Given Lasix for diuresis of effusion. Keflex started for erythema at CT site sutures 1/16-CXR essentially unchanged. Continued pain management and Pulm toilet. No Significant effusion on u/s. Given Lasix again. 1/18- CXR improved. Continued Pulm toilet.       PLAN  Neuro: Pain management. Cont current regiment.   Pulm: Encourage coughing, deep breathing and use of incentive spirometry. Cont nebs, cont chest PT Daily CXR.   Cardio: Monitor telemetry/alarms  GI: Tolerating diet. Continue stool softeners.  Renal: monitor urine output, supplement electrolytes as needed. Creatinine back to baseline  Vasc: Heparin SC/SCDs for DVT prophylaxis  Heme: Stable H/H. .   ID: Continue Keflex 10 day course for erythema at old CT sites, resolving. Will re send COVID swab for rehab plans.   Therapy: OOB/ambulate  Disposition: Aim to D/C to Rehab tomorrow.   Discussed with Cardiothoracic Team at AM rounds.

## 2021-01-18 NOTE — PROGRESS NOTE ADULT - PROVIDER SPECIALTY LIST ADULT
Anesthesia
CT Surgery
Critical Care
Pain Medicine
Thoracic Surgery
CT Surgery
Critical Care
Pain Medicine
Thoracic Surgery
CT Surgery

## 2021-01-19 VITALS
SYSTOLIC BLOOD PRESSURE: 106 MMHG | OXYGEN SATURATION: 94 % | HEART RATE: 86 BPM | DIASTOLIC BLOOD PRESSURE: 59 MMHG | RESPIRATION RATE: 18 BRPM | TEMPERATURE: 99 F

## 2021-01-19 LAB
ANION GAP SERPL CALC-SCNC: 10 MMOL/L — SIGNIFICANT CHANGE UP (ref 7–14)
BUN SERPL-MCNC: 16 MG/DL — SIGNIFICANT CHANGE UP (ref 7–23)
CALCIUM SERPL-MCNC: 9.5 MG/DL — SIGNIFICANT CHANGE UP (ref 8.4–10.5)
CHLORIDE SERPL-SCNC: 102 MMOL/L — SIGNIFICANT CHANGE UP (ref 98–107)
CO2 SERPL-SCNC: 28 MMOL/L — SIGNIFICANT CHANGE UP (ref 22–31)
CREAT SERPL-MCNC: 1.34 MG/DL — HIGH (ref 0.5–1.3)
GLUCOSE SERPL-MCNC: 101 MG/DL — HIGH (ref 70–99)
POTASSIUM SERPL-MCNC: 3.7 MMOL/L — SIGNIFICANT CHANGE UP (ref 3.5–5.3)
POTASSIUM SERPL-SCNC: 3.7 MMOL/L — SIGNIFICANT CHANGE UP (ref 3.5–5.3)
SODIUM SERPL-SCNC: 140 MMOL/L — SIGNIFICANT CHANGE UP (ref 135–145)

## 2021-01-19 PROCEDURE — 71045 X-RAY EXAM CHEST 1 VIEW: CPT | Mod: 26

## 2021-01-19 RX ORDER — ACETAMINOPHEN 500 MG
2 TABLET ORAL
Qty: 0 | Refills: 0 | DISCHARGE

## 2021-01-19 RX ORDER — OXYCODONE HYDROCHLORIDE 5 MG/1
1 TABLET ORAL
Qty: 30 | Refills: 0
Start: 2021-01-19 | End: 2021-01-23

## 2021-01-19 RX ORDER — CEPHALEXIN 500 MG
1 CAPSULE ORAL
Qty: 20 | Refills: 0
Start: 2021-01-19 | End: 2021-01-23

## 2021-01-19 RX ADMIN — POLYETHYLENE GLYCOL 3350 17 GRAM(S): 17 POWDER, FOR SOLUTION ORAL at 11:10

## 2021-01-19 RX ADMIN — Medication 30 MILLIGRAM(S): at 11:09

## 2021-01-19 RX ADMIN — Medication 500 MILLIGRAM(S): at 04:42

## 2021-01-19 RX ADMIN — OXYCODONE HYDROCHLORIDE 5 MILLIGRAM(S): 5 TABLET ORAL at 07:55

## 2021-01-19 RX ADMIN — Medication 3 MILLILITER(S): at 05:01

## 2021-01-19 RX ADMIN — Medication 500 MILLIGRAM(S): at 11:09

## 2021-01-19 RX ADMIN — FAMOTIDINE 20 MILLIGRAM(S): 10 INJECTION INTRAVENOUS at 04:42

## 2021-01-19 RX ADMIN — SODIUM CHLORIDE 4 MILLILITER(S): 9 INJECTION INTRAMUSCULAR; INTRAVENOUS; SUBCUTANEOUS at 05:01

## 2021-01-19 NOTE — PROVIDER CONTACT NOTE (OTHER) - BACKGROUND
patient s/p R VATS RUL Lobectomy
patient heart rate NSR and oxygen saturation has been above 93%. O2 sat went down to 90 when patient was asleep but then came back up.

## 2021-01-21 ENCOUNTER — NON-APPOINTMENT (OUTPATIENT)
Age: 61
End: 2021-01-21

## 2021-01-21 ENCOUNTER — INPATIENT (INPATIENT)
Facility: HOSPITAL | Age: 61
LOS: 1 days | Discharge: HOME HEALTH | End: 2021-01-23
Attending: THORACIC SURGERY (CARDIOTHORACIC VASCULAR SURGERY) | Admitting: THORACIC SURGERY (CARDIOTHORACIC VASCULAR SURGERY)
Payer: MEDICARE

## 2021-01-21 VITALS
DIASTOLIC BLOOD PRESSURE: 73 MMHG | HEIGHT: 70 IN | SYSTOLIC BLOOD PRESSURE: 119 MMHG | TEMPERATURE: 98 F | RESPIRATION RATE: 18 BRPM | OXYGEN SATURATION: 99 % | HEART RATE: 98 BPM

## 2021-01-21 DIAGNOSIS — R50.9 FEVER, UNSPECIFIED: ICD-10-CM

## 2021-01-21 LAB
ALBUMIN SERPL ELPH-MCNC: 4.1 G/DL — SIGNIFICANT CHANGE UP (ref 3.3–5)
ALP SERPL-CCNC: 93 U/L — SIGNIFICANT CHANGE UP (ref 40–120)
ALT FLD-CCNC: 21 U/L — SIGNIFICANT CHANGE UP (ref 4–41)
ANION GAP SERPL CALC-SCNC: 14 MMOL/L — SIGNIFICANT CHANGE UP (ref 7–14)
ANISOCYTOSIS BLD QL: SLIGHT — SIGNIFICANT CHANGE UP
AST SERPL-CCNC: 26 U/L — SIGNIFICANT CHANGE UP (ref 4–40)
BASOPHILS # BLD AUTO: 0 K/UL — SIGNIFICANT CHANGE UP (ref 0–0.2)
BASOPHILS NFR BLD AUTO: 0 % — SIGNIFICANT CHANGE UP (ref 0–2)
BILIRUB SERPL-MCNC: 0.6 MG/DL — SIGNIFICANT CHANGE UP (ref 0.2–1.2)
BLOOD GAS VENOUS COMPREHENSIVE RESULT: SIGNIFICANT CHANGE UP
BUN SERPL-MCNC: 19 MG/DL — SIGNIFICANT CHANGE UP (ref 7–23)
CALCIUM SERPL-MCNC: 9 MG/DL — SIGNIFICANT CHANGE UP (ref 8.4–10.5)
CHLORIDE SERPL-SCNC: 102 MMOL/L — SIGNIFICANT CHANGE UP (ref 98–107)
CO2 SERPL-SCNC: 24 MMOL/L — SIGNIFICANT CHANGE UP (ref 22–31)
CREAT SERPL-MCNC: 1.57 MG/DL — HIGH (ref 0.5–1.3)
EOSINOPHIL # BLD AUTO: 0.03 K/UL — SIGNIFICANT CHANGE UP (ref 0–0.5)
EOSINOPHIL NFR BLD AUTO: 0.8 % — SIGNIFICANT CHANGE UP (ref 0–6)
GLUCOSE SERPL-MCNC: 109 MG/DL — HIGH (ref 70–99)
HCT VFR BLD CALC: 33.2 % — LOW (ref 39–50)
HGB BLD-MCNC: 10.9 G/DL — LOW (ref 13–17)
HYPOCHROMIA BLD QL: SLIGHT — SIGNIFICANT CHANGE UP
IANC: 2.28 K/UL — SIGNIFICANT CHANGE UP (ref 1.5–8.5)
LYMPHOCYTES # BLD AUTO: 0.48 K/UL — LOW (ref 1–3.3)
LYMPHOCYTES # BLD AUTO: 12.2 % — LOW (ref 13–44)
MACROCYTES BLD QL: SLIGHT — SIGNIFICANT CHANGE UP
MCHC RBC-ENTMCNC: 30 PG — SIGNIFICANT CHANGE UP (ref 27–34)
MCHC RBC-ENTMCNC: 32.8 GM/DL — SIGNIFICANT CHANGE UP (ref 32–36)
MCV RBC AUTO: 91.5 FL — SIGNIFICANT CHANGE UP (ref 80–100)
MONOCYTES # BLD AUTO: 0.44 K/UL — SIGNIFICANT CHANGE UP (ref 0–0.9)
MONOCYTES NFR BLD AUTO: 11.3 % — SIGNIFICANT CHANGE UP (ref 2–14)
MYELOCYTES NFR BLD: 0.9 % — HIGH (ref 0–0)
NEUTROPHILS # BLD AUTO: 2.79 K/UL — SIGNIFICANT CHANGE UP (ref 1.8–7.4)
NEUTROPHILS NFR BLD AUTO: 71.3 % — SIGNIFICANT CHANGE UP (ref 43–77)
NRBC # BLD: 2 /100 — HIGH (ref 0–0)
PLAT MORPH BLD: NORMAL — SIGNIFICANT CHANGE UP
PLATELET # BLD AUTO: 302 K/UL — SIGNIFICANT CHANGE UP (ref 150–400)
PLATELET COUNT - ESTIMATE: NORMAL — SIGNIFICANT CHANGE UP
POLYCHROMASIA BLD QL SMEAR: SIGNIFICANT CHANGE UP
POTASSIUM SERPL-MCNC: 3.7 MMOL/L — SIGNIFICANT CHANGE UP (ref 3.5–5.3)
POTASSIUM SERPL-SCNC: 3.7 MMOL/L — SIGNIFICANT CHANGE UP (ref 3.5–5.3)
PROT SERPL-MCNC: 7 G/DL — SIGNIFICANT CHANGE UP (ref 6–8.3)
RBC # BLD: 3.63 M/UL — LOW (ref 4.2–5.8)
RBC # FLD: 13.3 % — SIGNIFICANT CHANGE UP (ref 10.3–14.5)
RBC BLD AUTO: ABNORMAL
SARS-COV-2 RNA SPEC QL NAA+PROBE: DETECTED
SODIUM SERPL-SCNC: 140 MMOL/L — SIGNIFICANT CHANGE UP (ref 135–145)
VARIANT LYMPHS # BLD: 3.5 % — SIGNIFICANT CHANGE UP (ref 0–6)
WBC # BLD: 3.91 K/UL — SIGNIFICANT CHANGE UP (ref 3.8–10.5)
WBC # FLD AUTO: 3.91 K/UL — SIGNIFICANT CHANGE UP (ref 3.8–10.5)

## 2021-01-21 PROCEDURE — 99223 1ST HOSP IP/OBS HIGH 75: CPT

## 2021-01-21 PROCEDURE — 99285 EMERGENCY DEPT VISIT HI MDM: CPT

## 2021-01-21 PROCEDURE — 71045 X-RAY EXAM CHEST 1 VIEW: CPT | Mod: 26

## 2021-01-21 RX ORDER — OXYCODONE HYDROCHLORIDE 5 MG/1
5 TABLET ORAL EVERY 4 HOURS
Refills: 0 | Status: DISCONTINUED | OUTPATIENT
Start: 2021-01-21 | End: 2021-01-23

## 2021-01-21 RX ORDER — ATORVASTATIN CALCIUM 80 MG/1
20 TABLET, FILM COATED ORAL AT BEDTIME
Refills: 0 | Status: DISCONTINUED | OUTPATIENT
Start: 2021-01-21 | End: 2021-01-23

## 2021-01-21 RX ORDER — SODIUM CHLORIDE 9 MG/ML
1000 INJECTION, SOLUTION INTRAVENOUS ONCE
Refills: 0 | Status: COMPLETED | OUTPATIENT
Start: 2021-01-21 | End: 2021-01-21

## 2021-01-21 RX ORDER — ACETAMINOPHEN 500 MG
650 TABLET ORAL EVERY 6 HOURS
Refills: 0 | Status: DISCONTINUED | OUTPATIENT
Start: 2021-01-21 | End: 2021-01-23

## 2021-01-21 RX ORDER — CLOZAPINE 150 MG/1
100 TABLET, ORALLY DISINTEGRATING ORAL AT BEDTIME
Refills: 0 | Status: DISCONTINUED | OUTPATIENT
Start: 2021-01-21 | End: 2021-01-21

## 2021-01-21 RX ORDER — SENNA PLUS 8.6 MG/1
2 TABLET ORAL AT BEDTIME
Refills: 0 | Status: DISCONTINUED | OUTPATIENT
Start: 2021-01-21 | End: 2021-01-23

## 2021-01-21 RX ORDER — POLYETHYLENE GLYCOL 3350 17 G/17G
17 POWDER, FOR SOLUTION ORAL DAILY
Refills: 0 | Status: DISCONTINUED | OUTPATIENT
Start: 2021-01-21 | End: 2021-01-23

## 2021-01-21 RX ORDER — CEPHALEXIN 500 MG
500 CAPSULE ORAL
Refills: 0 | Status: DISCONTINUED | OUTPATIENT
Start: 2021-01-21 | End: 2021-01-23

## 2021-01-21 RX ORDER — CLOZAPINE 150 MG/1
200 TABLET, ORALLY DISINTEGRATING ORAL AT BEDTIME
Refills: 0 | Status: DISCONTINUED | OUTPATIENT
Start: 2021-01-21 | End: 2021-01-23

## 2021-01-21 RX ORDER — CLONAZEPAM 1 MG
2 TABLET ORAL AT BEDTIME
Refills: 0 | Status: DISCONTINUED | OUTPATIENT
Start: 2021-01-21 | End: 2021-01-23

## 2021-01-21 RX ORDER — HEPARIN SODIUM 5000 [USP'U]/ML
5000 INJECTION INTRAVENOUS; SUBCUTANEOUS EVERY 8 HOURS
Refills: 0 | Status: DISCONTINUED | OUTPATIENT
Start: 2021-01-21 | End: 2021-01-23

## 2021-01-21 RX ADMIN — Medication 2 MILLIGRAM(S): at 23:29

## 2021-01-21 RX ADMIN — POLYETHYLENE GLYCOL 3350 17 GRAM(S): 17 POWDER, FOR SOLUTION ORAL at 23:29

## 2021-01-21 RX ADMIN — Medication 500 MILLIGRAM(S): at 17:03

## 2021-01-21 RX ADMIN — SENNA PLUS 2 TABLET(S): 8.6 TABLET ORAL at 23:29

## 2021-01-21 RX ADMIN — SODIUM CHLORIDE 1000 MILLILITER(S): 9 INJECTION, SOLUTION INTRAVENOUS at 13:21

## 2021-01-21 RX ADMIN — HEPARIN SODIUM 5000 UNIT(S): 5000 INJECTION INTRAVENOUS; SUBCUTANEOUS at 23:29

## 2021-01-21 RX ADMIN — OXYCODONE HYDROCHLORIDE 5 MILLIGRAM(S): 5 TABLET ORAL at 23:29

## 2021-01-21 NOTE — H&P ADULT - NSHPLABSRESULTS_GEN_ALL_CORE
Vital Signs Last 24 Hrs  T(C): 35.8 (21 Jan 2021 12:36), Max: 36.5 (21 Jan 2021 12:14)  T(F): 96.5 (21 Jan 2021 12:36), Max: 97.7 (21 Jan 2021 12:14)  HR: 87 (21 Jan 2021 12:36) (87 - 98)  BP: 131/72 (21 Jan 2021 12:36) (119/73 - 131/72)  BP(mean): --  RR: 18 (21 Jan 2021 12:36) (18 - 18)  SpO2: 98% (21 Jan 2021 12:36) (98% - 99%)    LABS:                        10.9   3.91  )-----------( 302      ( 21 Jan 2021 13:12 )             33.2                 RADIOLOGY & ADDITIONAL STUDIES:  CXR unchanged from Xray on discharge

## 2021-01-21 NOTE — ED PROVIDER NOTE - OBJECTIVE STATEMENT
61 y/o male with PMHx of HLD, Substance Abuse, Schizophrenia, and recurrent PNA with recent right lobectomy who presented to the ED for fever X 1 day. Pt states he was recently admitted here and had a lobectomy for a recurrent PNA by Dr. Daly. Pt was discharged 2 days ago and noted having fever, Tmax of 102 yesterday. Pt denies any cough, nausea, vomiting, SOB, chest pain, or abd pain. Pt noted a temp of 101.2 today. Pt states taking Tylenol with improvement of fever.

## 2021-01-21 NOTE — ED PROVIDER NOTE - ATTENDING CONTRIBUTION TO CARE
Pt was seen and evaluated by me. Pt is a 61 y/o male with PMHx of HLD, Substance Abuse, Schizophrenia, and recurrent PNA with recent right lobectomy who presented to the ED for fever X 1 day. Pt states he was recently admitted here and had a lobectomy for a recurrent PNA by Dr. Daly. Pt was discharged 2 days ago and noted having fever, Tmax of 102 yesterday. Pt denies any cough, nausea, vomiting, SOB, chest pain, or abd pain. Pt noted a temp of 101.2 today. Pt states taking Tylenol with improvement of fever. Lungs CTA b/l. RRR. Abd soft, non-tender. Posterior back where site of wound is, C/D/I.  Concern for URI/Infection s/p lobectomy  Labs, CXR, Cultures, CT Surgery consult

## 2021-01-21 NOTE — ED ADULT NURSE NOTE - CHIEF COMPLAINT QUOTE
c/o 102 fever since last night s/p right lung lobectomy, site is clean, no SSx of infection noted, pt denies SOB, cough. hx of schizophrenia, drug abuse, pleural effusion. pt disheveled but with proper hygiene, calm and cooperative, answers to questions appropriately, communicates with full and complete sentences.

## 2021-01-21 NOTE — H&P ADULT - NSHPREVIEWOFSYSTEMS_GEN_ALL_CORE
REVIEW OF SYSTEMS    General: No Weight change/ Fatigue/ HA/Dizzy	    Respiratory and Thorax: No Cough/ Wheezing/ SOB/ Hemoptysis/ Sputum production  	  Cardiovascular: No Chest pain/ Palpitations/ Diaphoresis	    Gastrointestinal: No Nausea/ Vomiting/ Constipation/ Appetite Change	    Genitourinary: No Heamturia/ Dysuria/ Frequency change/ Impotence	    Musculoskeletal: No Pain/ Weakness/ Claudication	    Neurological: No Seizure/ Parastesias	    Hematology/Lymphatics: No hx of bleeding/ Edema

## 2021-01-21 NOTE — ED PROVIDER NOTE - TIMING
Subjective:      Donta William is a 2 m.o. female here with parents. Patient brought in for Nasal Congestion      History of Present Illness:  HPI  She has had nasal congestion for a few days.  She was not eating well yesterday but is going better today.  Slightly decreased UOP but still having about 7-8 wets per day.  No fever.  No runny nose or cough.  No vomiting.    Sister is getting over a cold she had recently.      Review of Systems   Constitutional: Positive for appetite change. Negative for activity change, fever and irritability.   HENT: Positive for congestion. Negative for rhinorrhea.    Respiratory: Negative for cough and wheezing.    Gastrointestinal: Negative for diarrhea and vomiting.   Genitourinary: Positive for decreased urine volume.   Skin: Negative for rash.       Objective:     Physical Exam   Constitutional: She appears well-developed and well-nourished. She is active.   HENT:   Head: Anterior fontanelle is flat.   Right Ear: Tympanic membrane normal. No middle ear effusion.   Left Ear: Tympanic membrane normal.  No middle ear effusion.   Nose: Congestion present. No rhinorrhea.   Mouth/Throat: Oropharynx is clear. Pharynx is normal.   Eyes: Conjunctivae are normal. Pupils are equal, round, and reactive to light. Right eye exhibits no discharge. Left eye exhibits no discharge.   Neck: Neck supple.   Cardiovascular: Normal rate, regular rhythm, S1 normal and S2 normal. Pulses are palpable.   No murmur heard.  Pulmonary/Chest: Effort normal and breath sounds normal. No respiratory distress. She has no wheezes. She has no rhonchi. She has no rales.   Abdominal: Soft. Bowel sounds are normal. She exhibits no distension and no mass. There is no hepatosplenomegaly. There is no tenderness.   Lymphadenopathy:     She has no cervical adenopathy.   Neurological: She is alert.   Skin: No rash noted.   Nursing note and vitals reviewed.      Assessment:   Donta was seen today for nasal  congestion.    Diagnoses and all orders for this visit:    Nasal congestion        ? Viral URI  Plan:       Nasal bulb to clear nose, can use saline nose drops first.  Cool mist humidifier in bedroom.  Steamy bathroom for congestion/cough.  Encourage clear fluids.  Reviewed signs and symptoms of respiratory distress.  Supportive care  Call or return if symptoms persist or worsen.  [facility] on [facility].       gradual onset

## 2021-01-21 NOTE — PATIENT PROFILE ADULT - VISION (WITH CORRECTIVE LENSES IF THE PATIENT USUALLY WEARS THEM):
unable to assess - patient intubated/Partially impaired: cannot see medication labels or newsprint, but can see obstacles in path, and the surrounding layout; can count fingers at arm's length

## 2021-01-21 NOTE — H&P ADULT - HISTORY OF PRESENT ILLNESS
HPI: 60y old  Male hx schizophrenia, recurrent PNA  On 1/7/2021 patient underwent Bronchoscopy, right vats, thoracotomy and right upper lobectomy. Pt intubated post op. Started on Zosyn for copious resp secretions. Recovered well. All cultures negative. All CTs now removed. PT discharged to home, cleared by physical therapy,  follow up as an outpatient. Patient presented to ED with complaint of fever, 102.1 last night. Patient states he took acetaminophen at home. He is without out chest pain, shortness of breath, chills, cough, abdominal pain, n/v.   He is afebrile currently. CT surgery consulted for evaluation.  HPI: 60y old  Male hx schizophrenia, recurrent PNA. On 1/7/2021 patient underwent Bronchoscopy, right vats, thoracotomy and right upper lobectomy and was discharged 1/17/2021. Now Patient presents to ED with complaint of fever, 102.1 last night. Patient states he took acetaminophen at home. He is without out chest pain, shortness of breath, chills, cough, abdominal pain, n/v, headache, aviles. IN the ED He is afebrile without a WBC count, O2 saturation is 100% on room air, all other vitals WNL. Urgent CXR appears stable and unchanged from discharge. He is to be admitted to Thoracic surgery for further evaluation of fevers and observation.

## 2021-01-21 NOTE — ED ADULT NURSE REASSESSMENT NOTE - NS ED NURSE REASSESS COMMENT FT1
Pt received from day shift RN. Vitals as noted. Pt in no acute distress at this time. Respirations even and unlabored. Transport at bedside to bring pt upstairs. comfort measures provided. Will continue to monitor.

## 2021-01-21 NOTE — ED ADULT NURSE NOTE - CHPI ED NUR SYMPTOMS NEG
no body aches/no chest pain/no chills/no cough/no diaphoresis/no edema/no headache/no hemoptysis/no shortness of breath

## 2021-01-21 NOTE — ED ADULT NURSE REASSESSMENT NOTE - NS ED NURSE REASSESS COMMENT FT1
Patient alert and awake, breathing with ease on room air, spo2@98%, admitted to t621a, patient covid19 +, pending transport at this time, verbal report given to [employer].

## 2021-01-21 NOTE — ED PROVIDER NOTE - CLINICAL SUMMARY MEDICAL DECISION MAKING FREE TEXT BOX
61 y/o male with PMHx of HLD, Substance Abuse, Schizophrenia, and recurrent PNA with recent right lobectomy who presented to the ED for fever X 1 day.  Concern for URI/Infection s/p lobectomy  Labs, CXR, Cultures, CT Surgery consult

## 2021-01-21 NOTE — ED ADULT NURSE NOTE - OBJECTIVE STATEMENT
Patient is alert and awake, oriented x4, breathing with ease on room air, denies any respiratory distress, SOB, or chest pains. Patient able to ambulate steady independently, skin intact. Patient reports having right lung lobectomy approximately 3 days ago and started having a 102 fever last night. Patient reports having 101F this AM and took tylenol and percocet approximately 3 hours ago. Patient denies current use of ETOH, tobacco or recreational drugs. Patient with clean steri-strips intact to surgical site of right back area, no drainage or bleeding noted.

## 2021-01-21 NOTE — ED ADULT TRIAGE NOTE - CHIEF COMPLAINT QUOTE
c/o 102 fever since last night s/p right lung lobectomy, site is clean, no SSx of infection noted, pt denies SOB, cough. hx of schizophrenia, drug abuse, pleural effusion. c/o 102 fever since last night s/p right lung lobectomy, site is clean, no SSx of infection noted, pt denies SOB, cough. hx of schizophrenia, drug abuse, pleural effusion. pt disheveled but with proper hygiene, calm and cooperative, answers to questions appropriately, communicates with full and complete sentences.

## 2021-01-21 NOTE — H&P ADULT - NSHPSOCIALHISTORY_GEN_ALL_CORE
· Marital Status	Single  · Occupation	retired  · Lives With	alone    · Tobacco Usage: Former smoker  · Tobacco Type: cigarettes  quit 1990 per pt medical record  · Number of Packs per Day: 1  · Number of yrs: 5  · Pack yrs: 5     Substance Use History:  · Substance Use	street drug/inhalant/medication abuse  denies  · Street Drug/Medication/Inhalant Use	pt denies, per pt medical record sober 20yrs  · Duration of Street Drug/Medication/Inhalant Use (mo/yr)	pt denies  · 1. Have you felt you ought to CUT down on your drinking or drug use?	no  · 2. Have people ANNOYED you by criticizing your drinking or drug use?	no  · 3. Have you ever felt bad or GUILTY about your drinking or drug use?	no  · 4. Have you ever needed an "EYE OPENER", a drink or used drugs first thing in the morning to steady your nerves or get rid of a hangover?	no     Alcohol Use History:  · Have you ever consumed alcohol	yes...  · Alcohol Type	quit  · Problems Related to Alcohol Use	no  · 1. Have you felt you ought to CUT down on your drinking?	no  · 2. Have people ANNOYED you by criticizing your drinking?	no  · 3. Have you ever felt bad or GUILTY about your drinking?	no  · 4. Have you ever needed an "EYE OPENER", a drink first thing in the morning to steady your nerves or get rid of a hangover?	no

## 2021-01-21 NOTE — ED ADULT NURSE NOTE - SUICIDE SCREENING QUESTION 3
No Topical Sulfur Applications Counseling: Topical Sulfur Counseling: Patient counseled that this medication may cause skin irritation or allergic reactions.  In the event of skin irritation, the patient was advised to reduce the amount of the drug applied or use it less frequently.   The patient verbalized understanding of the proper use and possible adverse effects of topical sulfur application.  All of the patient's questions and concerns were addressed.

## 2021-01-21 NOTE — H&P ADULT - ASSESSMENT
ASSESSMENT:   60yMalePAST MEDICAL & SURGICAL HISTORY:  H/O pleural effusion  DX IN 2011 when pt had pneumonia  chronic condition now    Obesity    Migraines    Pneumonia  2011    Smoking hx    H/O ETOH abuse  sober x 20 yrs    Drug abuse, in remission  sober x 20 yrs    HLD (hyperlipidemia)    Schizophrenia    Skin lesion  face   &quot;pre-cancerous&quot;    Cyst  scalp excised 20 yrs ago    HEALTH ISSUES - PROBLEM Dx:      HEALTH ISSUES - R/O PROBLEM Dx:  Patient presenting to ED with c/o fever last night and this AM    PLAN:  -Will admit to 6N for observation  -Continue keflex for wound ppx  -Currently afebrile, continue to trend  -Discussed with Dr. Daly 60y old  Male hx schizophrenia, recurrent PNA, s/p right vats, thoracotomy and right upper lobectomy and was discharged 1/17/2021. Now Patient presents to ED with complaint of fever, 102.1 last night.       PLAN:  -Will admit to 6N for observation  -Continue keflex for wound ppx  -Currently afebrile, continue to trend  -Normal WBC, trend  -r/o Covid  -Elevated Cr on readmission, making urine. Could be related to keflex. Will cx Renal. Obtain U/A  -Discussed with Dr. Daly

## 2021-01-22 ENCOUNTER — TRANSCRIPTION ENCOUNTER (OUTPATIENT)
Age: 61
End: 2021-01-22

## 2021-01-22 DIAGNOSIS — U07.1 COVID-19: ICD-10-CM

## 2021-01-22 DIAGNOSIS — N17.9 ACUTE KIDNEY FAILURE, UNSPECIFIED: ICD-10-CM

## 2021-01-22 DIAGNOSIS — Z29.9 ENCOUNTER FOR PROPHYLACTIC MEASURES, UNSPECIFIED: ICD-10-CM

## 2021-01-22 DIAGNOSIS — F20.9 SCHIZOPHRENIA, UNSPECIFIED: ICD-10-CM

## 2021-01-22 LAB
ANION GAP SERPL CALC-SCNC: 12 MMOL/L — SIGNIFICANT CHANGE UP (ref 7–14)
BUN SERPL-MCNC: 16 MG/DL — SIGNIFICANT CHANGE UP (ref 7–23)
CALCIUM SERPL-MCNC: 8.6 MG/DL — SIGNIFICANT CHANGE UP (ref 8.4–10.5)
CHLORIDE SERPL-SCNC: 103 MMOL/L — SIGNIFICANT CHANGE UP (ref 98–107)
CO2 SERPL-SCNC: 22 MMOL/L — SIGNIFICANT CHANGE UP (ref 22–31)
CREAT SERPL-MCNC: 1.32 MG/DL — HIGH (ref 0.5–1.3)
CRP SERPL-MCNC: 37.4 MG/L — HIGH
D DIMER BLD IA.RAPID-MCNC: 2561 NG/ML DDU — HIGH
FERRITIN SERPL-MCNC: 403 NG/ML — HIGH (ref 30–400)
GLUCOSE SERPL-MCNC: 93 MG/DL — SIGNIFICANT CHANGE UP (ref 70–99)
HCT VFR BLD CALC: 29.4 % — LOW (ref 39–50)
HGB BLD-MCNC: 9.5 G/DL — LOW (ref 13–17)
LDH SERPL L TO P-CCNC: 196 U/L — SIGNIFICANT CHANGE UP (ref 135–225)
MCHC RBC-ENTMCNC: 29.6 PG — SIGNIFICANT CHANGE UP (ref 27–34)
MCHC RBC-ENTMCNC: 32.3 GM/DL — SIGNIFICANT CHANGE UP (ref 32–36)
MCV RBC AUTO: 91.6 FL — SIGNIFICANT CHANGE UP (ref 80–100)
NRBC # BLD: 0 /100 WBCS — SIGNIFICANT CHANGE UP
NRBC # FLD: 0 K/UL — SIGNIFICANT CHANGE UP
PLATELET # BLD AUTO: 302 K/UL — SIGNIFICANT CHANGE UP (ref 150–400)
POTASSIUM SERPL-MCNC: 3.3 MMOL/L — LOW (ref 3.5–5.3)
POTASSIUM SERPL-SCNC: 3.3 MMOL/L — LOW (ref 3.5–5.3)
RBC # BLD: 3.21 M/UL — LOW (ref 4.2–5.8)
RBC # FLD: 13.3 % — SIGNIFICANT CHANGE UP (ref 10.3–14.5)
SODIUM SERPL-SCNC: 137 MMOL/L — SIGNIFICANT CHANGE UP (ref 135–145)
WBC # BLD: 3.08 K/UL — LOW (ref 3.8–10.5)
WBC # FLD AUTO: 3.08 K/UL — LOW (ref 3.8–10.5)

## 2021-01-22 PROCEDURE — 99223 1ST HOSP IP/OBS HIGH 75: CPT | Mod: GC

## 2021-01-22 PROCEDURE — 71045 X-RAY EXAM CHEST 1 VIEW: CPT | Mod: 26

## 2021-01-22 RX ORDER — POTASSIUM CHLORIDE 20 MEQ
40 PACKET (EA) ORAL ONCE
Refills: 0 | Status: COMPLETED | OUTPATIENT
Start: 2021-01-22 | End: 2021-01-22

## 2021-01-22 RX ORDER — ACETAMINOPHEN 500 MG
1000 TABLET ORAL ONCE
Refills: 0 | Status: COMPLETED | OUTPATIENT
Start: 2021-01-22 | End: 2021-01-22

## 2021-01-22 RX ADMIN — Medication 60 MILLIGRAM(S): at 23:25

## 2021-01-22 RX ADMIN — CLOZAPINE 200 MILLIGRAM(S): 150 TABLET, ORALLY DISINTEGRATING ORAL at 23:25

## 2021-01-22 RX ADMIN — OXYCODONE HYDROCHLORIDE 5 MILLIGRAM(S): 5 TABLET ORAL at 12:28

## 2021-01-22 RX ADMIN — HEPARIN SODIUM 5000 UNIT(S): 5000 INJECTION INTRAVENOUS; SUBCUTANEOUS at 12:10

## 2021-01-22 RX ADMIN — Medication 500 MILLIGRAM(S): at 12:10

## 2021-01-22 RX ADMIN — Medication 500 MILLIGRAM(S): at 23:26

## 2021-01-22 RX ADMIN — HEPARIN SODIUM 5000 UNIT(S): 5000 INJECTION INTRAVENOUS; SUBCUTANEOUS at 07:44

## 2021-01-22 RX ADMIN — HEPARIN SODIUM 5000 UNIT(S): 5000 INJECTION INTRAVENOUS; SUBCUTANEOUS at 23:25

## 2021-01-22 RX ADMIN — SENNA PLUS 2 TABLET(S): 8.6 TABLET ORAL at 23:25

## 2021-01-22 RX ADMIN — CLOZAPINE 200 MILLIGRAM(S): 150 TABLET, ORALLY DISINTEGRATING ORAL at 01:15

## 2021-01-22 RX ADMIN — Medication 400 MILLIGRAM(S): at 14:40

## 2021-01-22 RX ADMIN — Medication 500 MILLIGRAM(S): at 01:15

## 2021-01-22 RX ADMIN — OXYCODONE HYDROCHLORIDE 5 MILLIGRAM(S): 5 TABLET ORAL at 23:25

## 2021-01-22 RX ADMIN — Medication 40 MILLIEQUIVALENT(S): at 17:15

## 2021-01-22 RX ADMIN — Medication 500 MILLIGRAM(S): at 07:44

## 2021-01-22 RX ADMIN — ATORVASTATIN CALCIUM 20 MILLIGRAM(S): 80 TABLET, FILM COATED ORAL at 01:16

## 2021-01-22 RX ADMIN — Medication 60 MILLIGRAM(S): at 01:15

## 2021-01-22 RX ADMIN — POLYETHYLENE GLYCOL 3350 17 GRAM(S): 17 POWDER, FOR SOLUTION ORAL at 12:10

## 2021-01-22 RX ADMIN — ATORVASTATIN CALCIUM 20 MILLIGRAM(S): 80 TABLET, FILM COATED ORAL at 23:24

## 2021-01-22 RX ADMIN — Medication 500 MILLIGRAM(S): at 17:14

## 2021-01-22 NOTE — DISCHARGE NOTE PROVIDER - HOSPITAL COURSE
61 yo M with PMhx of schizoaffective disorder, ETOH, former smoker and recurrent PNA s/p right vats, thoracotomy, and right upper lobectomy presented to the ED with fever and diffuse body aches. Patient was recently discharged from [facility] after R lobectomy. He was found to have COVID. Since admission, he has not been hypoxic on RA. He has been reporting with R chest pain and body aches which are controlled with pain meds. 59 yo M with PMhx of schizoaffective disorder, ETOH, former smoker and recurrent PNA s/p right vats, thoracotomy, and right upper lobectomy presented to the ED with fever and diffuse body aches. Patient was recently discharged from [facility] after R lobectomy. He was found to have COVID. Since admission, he has not been hypoxic on RA. He has been reporting with R chest pain and body aches which are controlled with pain meds. Pt evaluated by  PT and was deemed to not have PT needs.  He remained afebrile and did not require oxygen.  He is stable for discharge home with pulse oximeter and VNS.  Discharge plan was d/w pt and his family who are in agreement.  He will be transported home by ambulance

## 2021-01-22 NOTE — CONSULT NOTE ADULT - PROBLEM SELECTOR RECOMMENDATION 2
JOCELYN with elevated Scr to 1.5   -Now downtrended to 1.3   -Nephrology recs appreciated   -If Scr increases, bladder scan and send urine Na, urea, and creatinine   -Avoid nephro toxic agents

## 2021-01-22 NOTE — CONSULT NOTE ADULT - PROBLEM SELECTOR RECOMMENDATION 9
-Patient with COVID PNA. Not hypoxic and sating >90% os RA  -Remdesivir and Dex are not indicated   -Patient will benefit from PT because of debility due to recent surgery and COVID infection  -If he does not require rehab, please d/c with pulse ox.   -No need for abx at this point for superimposed PNA  -Pain control with tylenol and oxycodone as per primary team

## 2021-01-22 NOTE — DISCHARGE NOTE PROVIDER - CARE PROVIDER_API CALL
Roberto Daly)  Surgery; Thoracic Surgery  [address] [place] [address] [place] [address]  Phone: [phone]  Fax: [phone]  Follow Up Time:

## 2021-01-22 NOTE — CONSULT NOTE ADULT - ATTENDING COMMENTS
60M with PMH of schizophrenia, recurrent PNAs s/p right VATS, thoracotomy, and right upper lobectomy (1/7) who presented to the hospital w/ fever. Initial labs notable for mild Cr elevation along w/ positive COVID PCR. No other focal infections appreciated. His myalgias, fatigue, and fevers are most likely 2' COVID-19 infection. Fortunately, his O2 sats remain stable w/o need for supplemental support. No new fevers since coming in. As such, there are no specific therapies to start other than supportive measures. Spoke w/ pt's mother Brandi who expressed concerns re: pt's return home alone especially now that he will require isolation.     -Would check ambulatory saturations to assess need for home O2.  -Would c/s PT to evaluate for dispo needs given deconditioned state post-operatively and now w/ viral infection.  -If going home - would suggest D/C with pulse oximeter.  -Hypokalemia - would replete lytes.   -Case d/w thoracic PA. Will follow along side pending PT eval. 60M with PMH of schizophrenia, recurrent PNAs s/p right VATS, thoracotomy, and right upper lobectomy (1/7) who presented to the hospital w/ fever. Initial labs notable for mild Cr elevation along w/ positive COVID PCR. No other focal infections appreciated. His myalgias, fatigue, and fevers are most likely 2' COVID-19 infection. Fortunately, his O2 sats remain stable w/o need for supplemental support. No new fevers since coming in. As such, there are no specific therapies to start other than supportive measures. Spoke w/ pt's mother Brandi who expressed concerns re: pt's return home alone especially now that he will require isolation.     -Would check ambulatory saturations to assess need for home O2.  -Would c/s PT to evaluate for dispo needs given deconditioned state post-operatively and now w/ viral infection.  -Hypokalemia - would replete lytes.   -If going home - would suggest D/C with pulse oximeter. There are also other programs that can follow pt should he return home (elicit COVID Rapid Transition Team, Cares program, [facility]). Will reassess need after PT eval.  -Case d/w thoracic PA. Will follow along side pending PT eval.

## 2021-01-22 NOTE — DISCHARGE NOTE PROVIDER - NSDCFUADDAPPT_GEN_ALL_CORE_FT
Follow up with Dr. Daly in 7-10 days  Chest x-ray 1-2 days prior to appointment with Dr. Daly.  Please bring copy of x-ray to appointment with Dr. Daly   Follow up with primary care provider in one week  Follow up with Dr. Daly in 7-10 days; call our office  for appointment  Chest x-ray 1-2 days prior to appointment with Dr. Daly (you can do this at [facility])      Follow up with primary care provider in one week

## 2021-01-22 NOTE — CONSULT NOTE ADULT - SUBJECTIVE AND OBJECTIVE BOX
59 yo M with PMhx of schizoaffective disorder, ETOH, former smoker and recurrent PNA s/p right vats, thoracotomy, and right upper lobectomy presented to the ED with fever and diffuse body aches. Patient was recently discharged from [facility] after R lobectomy. He was found to have COVID. Since admission, he has not been hypoxic on RA. He has been reporting with R chest pain and body aches which are controlled with pain meds. Otherwise, he denies any fever, chills, nausea, vomiting abdominal pain or diarrhea.     PAST MEDICAL & SURGICAL HISTORY:  H/O pleural effusion  DX IN 2011 when pt had pneumonia  chronic condition now    Obesity    Migraines    Pneumonia  2011    Smoking hx    H/O ETOH abuse  sober x 20 yrs    Drug abuse, in remission  sober x 20 yrs    HLD (hyperlipidemia)    Schizophrenia    Skin lesion  face   &quot;pre-cancerous&quot;    Cyst  scalp excised 20 yrs ago        Social History:  · Marital Status	Single  · Occupation	retired  · Lives With	alone    · Tobacco Usage: Former smoker  · Tobacco Type: cigarettes  quit 1990 per pt medical record  · Number of Packs per Day: 1  · Number of yrs: 5  · Pack yrs: 5     Substance Use History:  · Substance Use	street drug/inhalant/medication abuse  denies  · Street Drug/Medication/Inhalant Use	pt denies, per pt medical record sober 20yrs  · Duration of Street Drug/Medication/Inhalant Use (mo/yr)	pt denies  · 1. Have you felt you ought to CUT down on your drinking or drug use?	no  · 2. Have people ANNOYED you by criticizing your drinking or drug use?	no  · 3. Have you ever felt bad or GUILTY about your drinking or drug use?	no  · 4. Have you ever needed an "EYE OPENER", a drink or used drugs first thing in the morning to steady your nerves or get rid of a hangover?	no     Alcohol Use History:  · Have you ever consumed alcohol	yes...  · Alcohol Type	quit  · Problems Related to Alcohol Use	no  · 1. Have you felt you ought to CUT down on your drinking?	no  · 2. Have people ANNOYED you by criticizing your drinking?	no  · 3. Have you ever felt bad or GUILTY about your drinking?	no  · 4. Have you ever needed an "EYE OPENER", a drink first thing in the morning to steady your nerves or get rid of a hangover?	no (21 Jan 2021 13:43)    Allergies    No Known Allergies    Intolerances      Home Medications:  bisacodyl 10 mg rectal suppository: 1 suppository(ies) rectal once a day, As needed, Constipation (15 Ronald 2021 11:20)  clonazePAM 2 mg oral tablet: 1 tab(s) orally once a day (at bedtime) (07 Jan 2021 07:44)  Clozaril 100 mg oral tablet: 2 tab(s) orally once a day (in the evening) (07 Jan 2021 07:44)  Paxil 30 mg oral tablet: 2 tab(s) orally once a day (07 Jan 2021 07:44)  polyethylene glycol 3350 oral powder for reconstitution: 17 gram(s) orally once a day (14 Jan 2021 08:20)  rosuvastatin 5 mg oral tablet: 1 tab(s) orally once a day (at bedtime) (07 Jan 2021 07:44)  senna oral tablet: 2 tab(s) orally once a day (at bedtime) (14 Jan 2021 08:20)  Tylenol 325 mg oral tablet: 2 tab(s) orally every 4 hours, As Needed mild pain (19 Jan 2021 11:21)        Vital Signs Last 24 Hrs  T(C): 36.4 (22 Jan 2021 11:57), Max: 37 (21 Jan 2021 21:15)  T(F): 97.6 (22 Jan 2021 11:57), Max: 98.6 (21 Jan 2021 21:15)  HR: 101 (22 Jan 2021 11:57) (86 - 101)  BP: 101/62 (22 Jan 2021 11:57) (101/62 - 122/77)  BP(mean): --  RR: 18 (22 Jan 2021 11:57) (16 - 18)  SpO2: 92% (22 Jan 2021 11:57) (92% - 99%)      GENERAL: NAD, well-developed  HEENT:  Atraumatic, Normocephalic, EOMI, PERRLA, conjunctiva and sclera clear, oral mucosa moist, clear w/o any exudate   NECK: Supple, No JVD  CHEST/LUNG: Clear to auscultation bilaterally; No wheeze  HEART: Regular rate and rhythm; No murmurs, rubs, or gallops  ABDOMEN: Soft, Nontender, Nondistended; Bowel sounds present  EXTREMITIES:  2+ Peripheral Pulses, No clubbing, cyanosis, or edema  PSYCH: AAOx3  NEUROLOGY: non-focal  SKIN: No rashes or lesions                              9.5    3.08  )-----------( 302      ( 22 Jan 2021 06:51 )             29.4     Hgb Trend: 9.5<--, 10.9<--, 9.3<--, 9.6<--  01-22    137  |  103  |  16  ----------------------------<  93  3.3<L>   |  22  |  1.32<H>    Ca    8.6      22 Jan 2021 06:51    TPro  7.0  /  Alb  4.1  /  TBili  0.6  /  DBili  x   /  AST  26  /  ALT  21  /  AlkPhos  93  01-21    Creatinine Trend: 1.32<--, 1.57<--, 1.34<--, 1.18<--, 1.33<--, 1.26<--                               61 yo M with PMhx of schizoaffective disorder, ETOH, former smoker and recurrent PNA s/p right vats, thoracotomy, and right upper lobectomy presented to the ED with fever and diffuse body aches. Patient was recently discharged from [facility] after R lobectomy. He was found to have COVID. Since admission, he has not been hypoxic on RA. He has been reporting with R chest pain and body aches which are controlled with pain meds. Otherwise, he denies any fever, chills, nausea, vomiting abdominal pain or diarrhea.     PAST MEDICAL & SURGICAL HISTORY:  H/O pleural effusion  DX IN 2011 when pt had pneumonia  chronic condition now    Obesity    Migraines    Pneumonia  2011    Smoking hx    H/O ETOH abuse  sober x 20 yrs    Drug abuse, in remission  sober x 20 yrs    HLD (hyperlipidemia)    Schizophrenia    Skin lesion  face   &quot;pre-cancerous&quot;    Cyst  scalp excised 20 yrs ago        Social History:  · Marital Status	Single  · Occupation	retired  · Lives With	alone    · Tobacco Usage: Former smoker  · Tobacco Type: cigarettes  quit 1990 per pt medical record  · Number of Packs per Day: 1  · Number of yrs: 5  · Pack yrs: 5     Substance Use History:  · Substance Use	street drug/inhalant/medication abuse  denies  · Street Drug/Medication/Inhalant Use	pt denies, per pt medical record sober 20yrs  · Duration of Street Drug/Medication/Inhalant Use (mo/yr)	pt denies  · 1. Have you felt you ought to CUT down on your drinking or drug use?	no  · 2. Have people ANNOYED you by criticizing your drinking or drug use?	no  · 3. Have you ever felt bad or GUILTY about your drinking or drug use?	no  · 4. Have you ever needed an "EYE OPENER", a drink or used drugs first thing in the morning to steady your nerves or get rid of a hangover?	no     Alcohol Use History:  · Have you ever consumed alcohol	yes...  · Alcohol Type	quit  · Problems Related to Alcohol Use	no  · 1. Have you felt you ought to CUT down on your drinking?	no  · 2. Have people ANNOYED you by criticizing your drinking?	no  · 3. Have you ever felt bad or GUILTY about your drinking?	no  · 4. Have you ever needed an "EYE OPENER", a drink first thing in the morning to steady your nerves or get rid of a hangover?	no (21 Jan 2021 13:43)    Allergies    No Known Allergies    Intolerances      Home Medications:  bisacodyl 10 mg rectal suppository: 1 suppository(ies) rectal once a day, As needed, Constipation (15 Ronald 2021 11:20)  clonazePAM 2 mg oral tablet: 1 tab(s) orally once a day (at bedtime) (07 Jan 2021 07:44)  Clozaril 100 mg oral tablet: 2 tab(s) orally once a day (in the evening) (07 Jan 2021 07:44)  Paxil 30 mg oral tablet: 2 tab(s) orally once a day (07 Jan 2021 07:44)  polyethylene glycol 3350 oral powder for reconstitution: 17 gram(s) orally once a day (14 Jan 2021 08:20)  rosuvastatin 5 mg oral tablet: 1 tab(s) orally once a day (at bedtime) (07 Jan 2021 07:44)  senna oral tablet: 2 tab(s) orally once a day (at bedtime) (14 Jan 2021 08:20)  Tylenol 325 mg oral tablet: 2 tab(s) orally every 4 hours, As Needed mild pain (19 Jan 2021 11:21)        Vital Signs Last 24 Hrs  T(C): 36.4 (22 Jan 2021 11:57), Max: 37 (21 Jan 2021 21:15)  T(F): 97.6 (22 Jan 2021 11:57), Max: 98.6 (21 Jan 2021 21:15)  HR: 101 (22 Jan 2021 11:57) (86 - 101)  BP: 101/62 (22 Jan 2021 11:57) (101/62 - 122/77)  BP(mean): --  RR: 18 (22 Jan 2021 11:57) (16 - 18)  SpO2: 92% (22 Jan 2021 11:57) (92% - 99%)      GENERAL: NAD, well-developed  HEENT:  Atraumatic, Normocephalic, EOMI, PERRLA, conjunctiva and sclera clear, oral mucosa moist, clear w/o any exudate   NECK: Supple, No JVD  CHEST/LUNG: Clear to auscultation bilaterally; No wheeze. Diminished at right base.  HEART: Regular rate and rhythm; No murmurs, rubs, or gallops  ABDOMEN: Soft, Nontender, Nondistended; Bowel sounds present  EXTREMITIES:  2+ Peripheral Pulses, No clubbing, cyanosis, or edema  PSYCH: AAOx3. Appropriate mood/affect.  NEUROLOGY: CN II-XII intact. Speech fluent/face symmetric. Strength 5/5.   SKIN: No rashes or lesions                              9.5    3.08  )-----------( 302      ( 22 Jan 2021 06:51 )             29.4     Hgb Trend: 9.5<--, 10.9<--, 9.3<--, 9.6<--  01-22    137  |  103  |  16  ----------------------------<  93  3.3<L>   |  22  |  1.32<H>    Ca    8.6      22 Jan 2021 06:51    TPro  7.0  /  Alb  4.1  /  TBili  0.6  /  DBili  x   /  AST  26  /  ALT  21  /  AlkPhos  93  01-21    Creatinine Trend: 1.32<--, 1.57<--, 1.34<--, 1.18<--, 1.33<--, 1.26<--

## 2021-01-22 NOTE — PROGRESS NOTE ADULT - SUBJECTIVE AND OBJECTIVE BOX
Subjective: "I feel weak and my muscles hurt"  pt denies shortness of breath, denies chest pain  no fevers or chills while admitted  pulse oximetry at bedside 97% on room air    Vital Signs:  Vital Signs Last 24 Hrs  T(C): 36.4 (01-22-21 @ 11:57), Max: 37 (01-21-21 @ 21:15)  T(F): 97.6 (01-22-21 @ 11:57), Max: 98.6 (01-21-21 @ 21:15)  HR: 101 (01-22-21 @ 11:57) (86 - 101)  BP: 101/62 (01-22-21 @ 11:57) (101/62 - 122/77)  RR: 18 (01-22-21 @ 11:57) (16 - 18)  SpO2: 92% (01-22-21 @ 11:57) (92% - 99%) on (O2)    Telemetry/Alarms:  General: WN/WD NAD  Neurology: Awake, nonfocal, GLOVER x 4  Eyes: Scleras clear, PERRLA/ EOMI, Gross vision intact  ENT:Gross hearing intact, grossly patent pharynx, no stridor  Neck: Neck supple, trachea midline, No JVD,   Respiratory: CTA B/L, No wheezing, rales, rhonchi  CV: RRR, S1S2, no murmurs, rubs or gallops  Abdominal: Soft, NT, ND +BS,   Extremities: No edema, + peripheral pulses  Skin: No Rashes, Hematoma, Ecchymosis  Lymphatic: No Neck, axilla, groin LAD  Psych: Oriented x 3, normal affect  Incisions: thoracotomy c,d,i  Relevant labs, radiology and Medications reviewed                        9.5    3.08  )-----------( 302      ( 22 Jan 2021 06:51 )             29.4     01-22    137  |  103  |  16  ----------------------------<  93  3.3<L>   |  22  |  1.32<H>    Ca    8.6      22 Jan 2021 06:51    TPro  7.0  /  Alb  4.1  /  TBili  0.6  /  DBili  x   /  AST  26  /  ALT  21  /  AlkPhos  93  01-21      MEDICATIONS  (STANDING):  acetaminophen  IVPB .. 1000 milliGRAM(s) IV Intermittent once  atorvastatin 20 milliGRAM(s) Oral at bedtime  cephalexin 500 milliGRAM(s) Oral four times a day  clonazePAM  Tablet 2 milliGRAM(s) Oral at bedtime  cloZAPine 200 milliGRAM(s) Oral at bedtime  heparin   Injectable 5000 Unit(s) SubCutaneous every 8 hours  PARoxetine 60 milliGRAM(s) Oral at bedtime  polyethylene glycol 3350 17 Gram(s) Oral daily  senna 2 Tablet(s) Oral at bedtime    MEDICATIONS  (PRN):  acetaminophen   Tablet .. 650 milliGRAM(s) Oral every 6 hours PRN Temp greater or equal to 38C (100.4F), Mild Pain (1 - 3)  bisacodyl Suppository 10 milliGRAM(s) Rectal daily PRN Constipation  oxyCODONE    IR 5 milliGRAM(s) Oral every 4 hours PRN Moderate Pain (4 - 6)    Pertinent Physical Exam  I&O's Summary      Assessment  60y Male  w/ PAST MEDICAL & SURGICAL HISTORY:  H/O pleural effusion  DX IN 2011 when pt had pneumonia  chronic condition now  Obesity  Migraines  Pneumonia  2011  Smoking hx  H/O ETOH abuse  sober x 20 yrs  Drug abuse, in remission  sober x 20 yrs  HLD (hyperlipidemia)  Schizophrenia  Skin lesion  face   &quot;pre-cancerous&quot;  Cyst  scalp excised 20 yrs ago    60y old  Male who presents with a chief complaint of .  On (Date), patient underwent . Postoperative course/issues:    PLAN  Neuro: Pain management  Pulm: Encourage coughing, deep breathing and use of incentive spirometry. Wean off supplemental oxygen as able. Daily CXR.   Cardio: Monitor telemetry/alarms  GI: Tolerating diet. Continue stool softeners.  Renal: monitor urine output, supplement electrolytes as needed  Vasc: Heparin SC/SCDs for DVT prophylaxis  Heme: Stable H/H. .   ID: Off antibiotics. Stable.  Therapy: OOB/ambulate  Tubes: Monitor Chest tube output  Disposition: Aim to D/C to home on  Discussed with Cardiothoracic Team at AM rounds.      Subjective: "I feel weak and my muscles hurt"  pt denies shortness of breath, denies chest pain  no fevers or chills while admitted  pulse oximetry at bedside 97% on room air    Vital Signs:  Vital Signs Last 24 Hrs  T(C): 36.4 (01-22-21 @ 11:57), Max: 37 (01-21-21 @ 21:15)  T(F): 97.6 (01-22-21 @ 11:57), Max: 98.6 (01-21-21 @ 21:15)  HR: 101 (01-22-21 @ 11:57) (86 - 101)  BP: 101/62 (01-22-21 @ 11:57) (101/62 - 122/77)  RR: 18 (01-22-21 @ 11:57) (16 - 18)  SpO2: 92% (01-22-21 @ 11:57) (92% - 99%) on (O2)    Telemetry/Alarms:  General: WN/WD NAD  Neurology: Awake, nonfocal, GLOVER x 4  Eyes: Scleras clear, PERRLA/ EOMI, Gross vision intact  ENT:Gross hearing intact, grossly patent pharynx, no stridor  Neck: Neck supple, trachea midline, No JVD,   Respiratory: CTA B/L, No wheezing, rales, rhonchi  CV: RRR, S1S2, no murmurs, rubs or gallops  Abdominal: Soft, NT, ND +BS,   Extremities: No edema, + peripheral pulses  Skin: No Rashes, Hematoma, Ecchymosis  Lymphatic: No Neck, axilla, groin LAD  Psych: Oriented x 3, normal affect  Incisions: thoracotomy c,d,i  Relevant labs, radiology and Medications reviewed                        9.5    3.08  )-----------( 302      ( 22 Jan 2021 06:51 )             29.4     01-22    137  |  103  |  16  ----------------------------<  93  3.3<L>   |  22  |  1.32<H>    Ca    8.6      22 Jan 2021 06:51    TPro  7.0  /  Alb  4.1  /  TBili  0.6  /  DBili  x   /  AST  26  /  ALT  21  /  AlkPhos  93  01-21      MEDICATIONS  (STANDING):  acetaminophen  IVPB .. 1000 milliGRAM(s) IV Intermittent once  atorvastatin 20 milliGRAM(s) Oral at bedtime  cephalexin 500 milliGRAM(s) Oral four times a day  clonazePAM  Tablet 2 milliGRAM(s) Oral at bedtime  cloZAPine 200 milliGRAM(s) Oral at bedtime  heparin   Injectable 5000 Unit(s) SubCutaneous every 8 hours  PARoxetine 60 milliGRAM(s) Oral at bedtime  polyethylene glycol 3350 17 Gram(s) Oral daily  senna 2 Tablet(s) Oral at bedtime    MEDICATIONS  (PRN):  acetaminophen   Tablet .. 650 milliGRAM(s) Oral every 6 hours PRN Temp greater or equal to 38C (100.4F), Mild Pain (1 - 3)  bisacodyl Suppository 10 milliGRAM(s) Rectal daily PRN Constipation  oxyCODONE    IR 5 milliGRAM(s) Oral every 4 hours PRN Moderate Pain (4 - 6)    Pertinent Physical Exam  I&O's Summary      Assessment  60y Male  w/ PAST MEDICAL & SURGICAL HISTORY:  H/O pleural effusion  DX IN 2011 when pt had pneumonia  chronic condition now  Obesity  Migraines  Pneumonia  2011  Smoking hx  H/O ETOH abuse  sober x 20 yrs  Drug abuse, in remission  sober x 20 yrs  HLD (hyperlipidemia)  Schizophrenia  Skin lesion  face   &quot;pre-cancerous&quot;  Cyst  scalp excised 20 yrs ago    60y old  Male who presents with a chief complaint of fever, admitted with +[id number]  POD 15 from RUL lobectomy 1/7/21. Awaiting medical evaluation    PLAN  Neuro: Pain management  Pulm: Encourage coughing, deep breathing and use of incentive spirometry. Wean off supplemental oxygen as able. Daily CXR.   Cardio: Monitor telemetry/alarms  GI: Tolerating diet. Continue stool softeners.  Renal: monitor urine output, supplement electrolytes as needed  Vasc: Heparin SC/SCDs for DVT prophylaxis  Heme: Stable H/H. .   ID: keflex  Therapy: OOB/ambulate  Disposition: supportive care, Awaiting medical evaluation  Discussed with Cardiothoracic Team at AM rounds.

## 2021-01-22 NOTE — DISCHARGE NOTE PROVIDER - NSDCMRMEDTOKEN_GEN_ALL_CORE_FT
bisacodyl 10 mg rectal suppository: 1 suppository(ies) rectal once a day, As needed, Constipation  cephalexin 500 mg oral capsule: 1 cap(s) orally 4 times a day until 1/25/21 then stop  chest xray PA and lateral: Dx s/p RVATS thoracotomy RUL lobectomy  clonazePAM 2 mg oral tablet: 1 tab(s) orally once a day (at bedtime)  Clozaril 100 mg oral tablet: 2 tab(s) orally once a day (in the evening)  oxyCODONE 5 mg oral tablet: 1 tab(s) orally every 4 hours, As Needed -for moderate pain MDD:6   Paxil 30 mg oral tablet: 2 tab(s) orally once a day  polyethylene glycol 3350 oral powder for reconstitution: 17 gram(s) orally once a day  rosuvastatin 5 mg oral tablet: 1 tab(s) orally once a day (at bedtime)  senna oral tablet: 2 tab(s) orally once a day (at bedtime)  Tylenol 325 mg oral tablet: 2 tab(s) orally every 4 hours, As Needed mild pain   bisacodyl 10 mg rectal suppository: 1 suppository(ies) rectal once a day, As needed, Constipation  cephalexin 500 mg oral capsule: 1 cap(s) orally 4 times a day until 1/25/21 then stop  clonazePAM 2 mg oral tablet: 1 tab(s) orally once a day (at bedtime)  Clozaril 100 mg oral tablet: 2 tab(s) orally once a day (in the evening)  oxyCODONE 5 mg oral tablet: 1 tab(s) orally every 4 hours, As Needed -for moderate pain MDD:6   Paxil 30 mg oral tablet: 2 tab(s) orally once a day  polyethylene glycol 3350 oral powder for reconstitution: 17 gram(s) orally once a day  rosuvastatin 5 mg oral tablet: 1 tab(s) orally once a day (at bedtime)  senna oral tablet: 2 tab(s) orally once a day (at bedtime)  Tylenol 325 mg oral tablet: 2 tab(s) orally every 4 hours, As Needed mild pain

## 2021-01-23 ENCOUNTER — TRANSCRIPTION ENCOUNTER (OUTPATIENT)
Age: 61
End: 2021-01-23

## 2021-01-23 VITALS
RESPIRATION RATE: 17 BRPM | HEART RATE: 77 BPM | OXYGEN SATURATION: 100 % | TEMPERATURE: 97 F | DIASTOLIC BLOOD PRESSURE: 67 MMHG | SYSTOLIC BLOOD PRESSURE: 119 MMHG

## 2021-01-23 LAB
CULTURE RESULTS: SIGNIFICANT CHANGE UP
SPECIMEN SOURCE: SIGNIFICANT CHANGE UP

## 2021-01-23 RX ADMIN — HEPARIN SODIUM 5000 UNIT(S): 5000 INJECTION INTRAVENOUS; SUBCUTANEOUS at 06:00

## 2021-01-23 RX ADMIN — Medication 500 MILLIGRAM(S): at 05:59

## 2021-01-23 RX ADMIN — OXYCODONE HYDROCHLORIDE 5 MILLIGRAM(S): 5 TABLET ORAL at 06:13

## 2021-01-23 NOTE — PHYSICAL THERAPY INITIAL EVALUATION ADULT - PREDICTED DURATION OF THERAPY (DAYS/WKS), PT EVAL
d/c home; no PT needs required. Patient demonstrating safe ambulation-->will not be placed on PT program.

## 2021-01-23 NOTE — DISCHARGE NOTE NURSING/CASE MANAGEMENT/SOCIAL WORK - PATIENT PORTAL LINK FT
You can access the FollowMyHealth Patient Portal offered by [facility] by registering at the following website: [email or web address]. By joining [employer]’s FollowMyHealth portal, you will also be able to view your health information using other applications (apps) compatible with our system.

## 2021-01-23 NOTE — PHYSICAL THERAPY INITIAL EVALUATION ADULT - PATIENT PROFILE REVIEW, REHAB EVAL
PT orders received: no formal activity order. Consult with AUTUMN Flores, pt may participate in PT evaluation./yes

## 2021-01-23 NOTE — PHYSICAL THERAPY INITIAL EVALUATION ADULT - PERTINENT HX OF CURRENT PROBLEM, REHAB EVAL
Patient is a 60 year old male admitted to [facility] on 1/21 with complaint of fever, 102.1. PMH Schizophrenia, recurrent PNA. On 1/7/2021 patient underwent Bronchoscopy, right vats, thoracotomy and right upper lobectomy and was discharged 1/17/2021.

## 2021-01-23 NOTE — DISCHARGE NOTE NURSING/CASE MANAGEMENT/SOCIAL WORK - NSDCFUADDAPPT_GEN_ALL_CORE_FT
Follow up with Dr. Daly in 7-10 days; call our office  for appointment  Chest x-ray 1-2 days prior to appointment with Dr. Daly (you can do this at [facility])      Follow up with primary care provider in one week

## 2021-01-23 NOTE — PHYSICAL THERAPY INITIAL EVALUATION ADULT - ADDITIONAL COMMENTS
Pt reports he lives alone in an apartment, no steps to negotiate. Patient reports he was previously independent in all ADLs and did not use an assistive device for ambulation.     Patient was left sitting in chair as found, all lines/tubes intact and call vazquez within reach, AUTUMN Flores aware

## 2021-01-25 ENCOUNTER — NON-APPOINTMENT (OUTPATIENT)
Age: 61
End: 2021-01-25

## 2021-02-05 ENCOUNTER — EMERGENCY (EMERGENCY)
Facility: HOSPITAL | Age: 61
LOS: 1 days | Discharge: HOME | End: 2021-02-05
Attending: EMERGENCY MEDICINE | Admitting: EMERGENCY MEDICINE
Payer: COMMERCIAL

## 2021-02-05 VITALS
SYSTOLIC BLOOD PRESSURE: 96 MMHG | TEMPERATURE: 97 F | OXYGEN SATURATION: 99 % | RESPIRATION RATE: 18 BRPM | HEART RATE: 89 BPM | WEIGHT: 235.01 LBS | DIASTOLIC BLOOD PRESSURE: 65 MMHG | HEIGHT: 70 IN

## 2021-02-05 VITALS
TEMPERATURE: 98 F | OXYGEN SATURATION: 99 % | HEART RATE: 86 BPM | RESPIRATION RATE: 18 BRPM | DIASTOLIC BLOOD PRESSURE: 62 MMHG | SYSTOLIC BLOOD PRESSURE: 117 MMHG

## 2021-02-05 LAB
ALBUMIN SERPL ELPH-MCNC: 3.4 G/DL — SIGNIFICANT CHANGE UP (ref 3.3–5)
ALP SERPL-CCNC: 80 U/L — SIGNIFICANT CHANGE UP (ref 40–120)
ALT FLD-CCNC: 11 U/L — LOW (ref 12–78)
ANION GAP SERPL CALC-SCNC: 10 MMOL/L — SIGNIFICANT CHANGE UP (ref 5–17)
AST SERPL-CCNC: 12 U/L — LOW (ref 15–37)
BASOPHILS # BLD AUTO: 0.01 K/UL — SIGNIFICANT CHANGE UP (ref 0–0.2)
BASOPHILS NFR BLD AUTO: 0.2 % — SIGNIFICANT CHANGE UP (ref 0–2)
BILIRUB SERPL-MCNC: 1.1 MG/DL — SIGNIFICANT CHANGE UP (ref 0.2–1.2)
BUN SERPL-MCNC: 11 MG/DL — SIGNIFICANT CHANGE UP (ref 7–23)
CALCIUM SERPL-MCNC: 8.5 MG/DL — SIGNIFICANT CHANGE UP (ref 8.5–10.1)
CHLORIDE SERPL-SCNC: 110 MMOL/L — HIGH (ref 96–108)
CO2 SERPL-SCNC: 26 MMOL/L — SIGNIFICANT CHANGE UP (ref 22–31)
CREAT SERPL-MCNC: 1.2 MG/DL — SIGNIFICANT CHANGE UP (ref 0.5–1.3)
EOSINOPHIL # BLD AUTO: 0.02 K/UL — SIGNIFICANT CHANGE UP (ref 0–0.5)
EOSINOPHIL NFR BLD AUTO: 0.4 % — SIGNIFICANT CHANGE UP (ref 0–6)
GLUCOSE SERPL-MCNC: 84 MG/DL — SIGNIFICANT CHANGE UP (ref 70–99)
HCT VFR BLD CALC: 32.8 % — LOW (ref 39–50)
HGB BLD-MCNC: 11.2 G/DL — LOW (ref 13–17)
IMM GRANULOCYTES NFR BLD AUTO: 1.5 % — SIGNIFICANT CHANGE UP (ref 0–1.5)
LYMPHOCYTES # BLD AUTO: 0.88 K/UL — LOW (ref 1–3.3)
LYMPHOCYTES # BLD AUTO: 18.6 % — SIGNIFICANT CHANGE UP (ref 13–44)
MAGNESIUM SERPL-MCNC: 2.2 MG/DL — SIGNIFICANT CHANGE UP (ref 1.6–2.6)
MCHC RBC-ENTMCNC: 29.8 PG — SIGNIFICANT CHANGE UP (ref 27–34)
MCHC RBC-ENTMCNC: 34.1 GM/DL — SIGNIFICANT CHANGE UP (ref 32–36)
MCV RBC AUTO: 87.2 FL — SIGNIFICANT CHANGE UP (ref 80–100)
MONOCYTES # BLD AUTO: 0.66 K/UL — SIGNIFICANT CHANGE UP (ref 0–0.9)
MONOCYTES NFR BLD AUTO: 13.9 % — SIGNIFICANT CHANGE UP (ref 2–14)
NEUTROPHILS # BLD AUTO: 3.1 K/UL — SIGNIFICANT CHANGE UP (ref 1.8–7.4)
NEUTROPHILS NFR BLD AUTO: 65.4 % — SIGNIFICANT CHANGE UP (ref 43–77)
NRBC # BLD: 0 /100 WBCS — SIGNIFICANT CHANGE UP (ref 0–0)
PLATELET # BLD AUTO: 205 K/UL — SIGNIFICANT CHANGE UP (ref 150–400)
POTASSIUM SERPL-MCNC: 3.4 MMOL/L — LOW (ref 3.5–5.3)
POTASSIUM SERPL-SCNC: 3.4 MMOL/L — LOW (ref 3.5–5.3)
PROT SERPL-MCNC: 7 G/DL — SIGNIFICANT CHANGE UP (ref 6–8.3)
RBC # BLD: 3.76 M/UL — LOW (ref 4.2–5.8)
RBC # FLD: 13.2 % — SIGNIFICANT CHANGE UP (ref 10.3–14.5)
SODIUM SERPL-SCNC: 146 MMOL/L — HIGH (ref 135–145)
WBC # BLD: 4.74 K/UL — SIGNIFICANT CHANGE UP (ref 3.8–10.5)
WBC # FLD AUTO: 4.74 K/UL — SIGNIFICANT CHANGE UP (ref 3.8–10.5)

## 2021-02-05 PROCEDURE — 96360 HYDRATION IV INFUSION INIT: CPT

## 2021-02-05 PROCEDURE — 36415 COLL VENOUS BLD VENIPUNCTURE: CPT

## 2021-02-05 PROCEDURE — 96361 HYDRATE IV INFUSION ADD-ON: CPT

## 2021-02-05 PROCEDURE — 80053 COMPREHEN METABOLIC PANEL: CPT

## 2021-02-05 PROCEDURE — 99284 EMERGENCY DEPT VISIT MOD MDM: CPT

## 2021-02-05 PROCEDURE — 99283 EMERGENCY DEPT VISIT LOW MDM: CPT | Mod: 25

## 2021-02-05 PROCEDURE — 85025 COMPLETE CBC W/AUTO DIFF WBC: CPT

## 2021-02-05 PROCEDURE — 83735 ASSAY OF MAGNESIUM: CPT

## 2021-02-05 PROCEDURE — 94640 AIRWAY INHALATION TREATMENT: CPT

## 2021-02-05 RX ORDER — ROSUVASTATIN CALCIUM 5 MG/1
1 TABLET ORAL
Qty: 0 | Refills: 0 | DISCHARGE

## 2021-02-05 RX ORDER — SODIUM CHLORIDE 9 MG/ML
1000 INJECTION INTRAMUSCULAR; INTRAVENOUS; SUBCUTANEOUS ONCE
Refills: 0 | Status: COMPLETED | OUTPATIENT
Start: 2021-02-05 | End: 2021-02-05

## 2021-02-05 RX ORDER — CLONAZEPAM 1 MG
1 TABLET ORAL
Qty: 0 | Refills: 0 | DISCHARGE

## 2021-02-05 RX ORDER — ALBUTEROL 90 UG/1
2 AEROSOL, METERED ORAL ONCE
Refills: 0 | Status: COMPLETED | OUTPATIENT
Start: 2021-02-05 | End: 2021-02-05

## 2021-02-05 RX ORDER — POTASSIUM CHLORIDE 20 MEQ
40 PACKET (EA) ORAL ONCE
Refills: 0 | Status: COMPLETED | OUTPATIENT
Start: 2021-02-05 | End: 2021-02-05

## 2021-02-05 RX ORDER — ACETAMINOPHEN 500 MG
2 TABLET ORAL
Qty: 0 | Refills: 0 | DISCHARGE

## 2021-02-05 RX ORDER — CLOZAPINE 150 MG/1
2 TABLET, ORALLY DISINTEGRATING ORAL
Qty: 0 | Refills: 0 | DISCHARGE

## 2021-02-05 RX ADMIN — SODIUM CHLORIDE 1000 MILLILITER(S): 9 INJECTION INTRAMUSCULAR; INTRAVENOUS; SUBCUTANEOUS at 20:46

## 2021-02-05 RX ADMIN — SODIUM CHLORIDE 1000 MILLILITER(S): 9 INJECTION INTRAMUSCULAR; INTRAVENOUS; SUBCUTANEOUS at 18:45

## 2021-02-05 RX ADMIN — ALBUTEROL 2 PUFF(S): 90 AEROSOL, METERED ORAL at 18:45

## 2021-02-05 RX ADMIN — Medication 40 MILLIEQUIVALENT(S): at 20:48

## 2021-02-05 NOTE — ED PROVIDER NOTE - CLINICAL SUMMARY MEDICAL DECISION MAKING FREE TEXT BOX
61 yo M known covid positive p/w dehydration and shortness of breath--had outpatient CT this AM, will attempt to get report--- check labs, lytes, IVF

## 2021-02-05 NOTE — ED PROVIDER NOTE - OBJECTIVE STATEMENT
59 yo M PMHx HLD, s/p R lobectomy ~1 month ago, schizophrenia, known covid positive presents to ED c/o shortness of breath x over 1 week. Pt had an outpatient CTA this AM, ordered by PCP. (will attempt to get report). Pt reports no appetite, poor po intake over the last 5 days, denies N/V. Pt states that overall he is feeling better, states cough improving, denies chest pain, dizziness, abd pain. pt states he has been monitoring his O2 sat at home- reports 98-99% RA. PCP- [place]

## 2021-02-05 NOTE — ED ADULT NURSE NOTE - SKIN TEMPERATURE MOISTURE
How Severe Are Your Spot(S)?: mild
What Is The Reason For Today's Visit?: Full Body Skin Examination with No Concerns
What Is The Reason For Today's Visit? (Being Monitored For X): concerning skin lesions on an annual basis
warm

## 2021-02-05 NOTE — ED PROVIDER NOTE - NSFOLLOWUPINSTRUCTIONS_ED_ALL_ED_FT
1) Follow up with your primary doctor within 1-2 days.  2) Return to the ER for worsening or concerning symptoms.

## 2021-02-05 NOTE — ED PROVIDER NOTE - CARE PLAN
Principal Discharge DX:	Dehydration  Secondary Diagnosis:	COVID-19  Secondary Diagnosis:	Shortness of breath

## 2021-02-05 NOTE — ED PROVIDER NOTE - ATTENDING CONTRIBUTION TO CARE
Pt seen and examined and d/w PA.  agree with a and p.  pt is a 59 yo male hx recent right lobectomy/open thoracotomy in december. pt then about 15 days ago with covid and admitted at [facility] for 2 days.  pt states no current f/c. but pw co contniued sob, worsening, no leg pain or swelling , no f/c.  pt on exam, disheveled,  mm dry, lungs cta, right thoracotomy scar healing well , no signs infection, check labs.  pt had cta today, will try to get results, mdi,  reeval after labs, ct result Pt seen and examined and d/w PA.  agree with a and p.  pt is a 59 yo male hx recent right lobectomy/open thoracotomy in december. pt then about 15 days ago with covid and admitted at [facility] for 2 days.  pt states no current f/c. but pw co contniued sob, worsening, no leg pain or swelling , no f/c.  pt on exam, disheveled,  mm dry, lungs cta, right thoracotomy scar healing well , no signs infection, check labs.  pt had cta today, will try to get results, mdi,  reeval after labs, ct result.

## 2021-02-05 NOTE — ED PROVIDER NOTE - PATIENT PORTAL LINK FT
You can access the FollowMyHealth Patient Portal offered by [facility] by registering at the following website: [email or web address]. By joining [employer]’s FollowMyHealth portal, you will also be able to view your health information using other applications (apps) compatible with our system.

## 2021-02-05 NOTE — ED PROVIDER NOTE - PROGRESS NOTE DETAILS
Spoke with Dr. Messina (covering for Dr. Neal)- unable to fax official report over since he is at home, but gave verbal report: no PE, ground glass opacities bilaterally s/p R lobectomy, small trace pleural effusion R lower lobe; agrees with plan to dc pt home for pcp follow up Discussed case with Dr. Daly, who performed lobectomy. Offers no objection for plan to dc home with supportive care and PCP follow up. Pt comfortable, well appearing, O2 Sat 97-99% RA, with exertion. Workup unremarkable. Pt advised hydration and pcp follow up. All results discussed at length with pt who was given opportunity to have all questions answered. Pt verbalizes agreement and understanding of plan and ED return precautions. Pt comfortable, well appearing, O2 Sat 97-99% RA, with exertion. Ambulating well without difficulty or distress. Workup unremarkable. Pt advised hydration and pcp follow up. All results discussed at length with pt who was given opportunity to have all questions answered. Pt verbalizes agreement and understanding of plan and ED return precautions. No emergent concerns at this time. Pt stable for DC home.

## 2021-02-05 NOTE — ED ADULT NURSE NOTE - OBJECTIVE STATEMENT
Pt received in bed alert and oriented and very anxious and c/o that he has Covid 19 for the last 2 weeks and he is not feeling any better. As per MD's orders IV nadeen placed blood specimen obtained and sent to the lab. Nursing care ongoing and safety maintained.

## 2021-02-06 LAB
CULTURE RESULTS: SIGNIFICANT CHANGE UP
SPECIMEN SOURCE: SIGNIFICANT CHANGE UP

## 2021-02-08 ENCOUNTER — APPOINTMENT (OUTPATIENT)
Dept: THORACIC SURGERY | Facility: CLINIC | Age: 61
End: 2021-02-08
Payer: MEDICARE

## 2021-02-08 ENCOUNTER — OUTPATIENT (OUTPATIENT)
Dept: OUTPATIENT SERVICES | Facility: HOSPITAL | Age: 61
LOS: 1 days | End: 2021-02-08
Payer: MEDICARE

## 2021-02-08 ENCOUNTER — APPOINTMENT (OUTPATIENT)
Dept: RADIOLOGY | Facility: HOSPITAL | Age: 61
End: 2021-02-08

## 2021-02-08 VITALS
SYSTOLIC BLOOD PRESSURE: 94 MMHG | HEIGHT: 70 IN | DIASTOLIC BLOOD PRESSURE: 66 MMHG | BODY MASS INDEX: 33.64 KG/M2 | WEIGHT: 235 LBS | HEART RATE: 99 BPM | RESPIRATION RATE: 18 BRPM | OXYGEN SATURATION: 97 %

## 2021-02-08 DIAGNOSIS — U07.1 COVID-19: ICD-10-CM

## 2021-02-08 PROCEDURE — 99024 POSTOP FOLLOW-UP VISIT: CPT

## 2021-02-08 PROCEDURE — 71046 X-RAY EXAM CHEST 2 VIEWS: CPT | Mod: 26

## 2021-02-09 NOTE — DISCHARGE NOTE NURSING/CASE MANAGEMENT/SOCIAL WORK - NSDCFUADDAPPT_GEN_ALL_CORE_FT
Follow up with Dr. Daly in 7-10 days  Chest x-ray 1-2 days prior to appointment with Dr. Daly.  Please bring copy of x-ray to appointment with Dr. Daly   Follow up with primary care provider in one week  albuterol 90 mcg/inh inhalation aerosol: 1 puff(s) inhaled every 4 hours  budesonide-formoterol 160 mcg-4.5 mcg/inh inhalation aerosol: 2 puff(s) inhaled 2 times a day  fluticasone 50 mcg/inh nasal spray: 1 spray(s) nasal 2 times a day, As Needed  gabapentin 100 mg oral capsule: 1 cap(s) orally 3 times a day  heparin: 5000 unit(s) subcutaneous every 8 hours  ipratropium-albuterol 0.5 mg-2.5 mg/3 mLinhalation solution: 3 milliliter(s) inhaled every 6 hours, As Needed  K-Phos Neutral oral tablet: 1 tab(s) orally 2 times a day   lisinopril 10 mg oral tablet: 1 tab(s) orally once a day  magnesium oxide 400 mg (241.3 mg elemental magnesium) oral tablet: 1 tab(s) orally 3 times a day (with meals)  nystatin 100,000 units/g topical cream: 1 application topically 2 times a day  polyethylene glycol 3350 oral powder for reconstitution: 17 gram(s) orally once a day, As Needed  predniSONE 10 mg oral tablet: 4 tab(s) orally once a day x 1 days  3 tab(s) orally once a day x 1 days  2 tab(s) orally once a day x 1 days  1 tab(s) orally once a day x 1 days  senna oral tablet: 2 tab(s) orally 2 times a day  sodium chloride 0.65% nasal spray: 1 spray(s) nasal 2 times a day, As Needed  tiotropium 18 mcg inhalation capsule: 1 cap(s) inhaled once a day  Vitamin D2 50,000 intl units (1.25 mg) oral capsule: 1 cap(s) orally once a week

## 2021-02-19 PROBLEM — U07.1 COVID-19: Status: ACTIVE | Noted: 2021-02-08

## 2021-02-22 ENCOUNTER — APPOINTMENT (OUTPATIENT)
Dept: THORACIC SURGERY | Facility: CLINIC | Age: 61
End: 2021-02-22
Payer: MEDICARE

## 2021-02-22 ENCOUNTER — APPOINTMENT (OUTPATIENT)
Dept: RADIOLOGY | Facility: HOSPITAL | Age: 61
End: 2021-02-22

## 2021-02-22 ENCOUNTER — OUTPATIENT (OUTPATIENT)
Dept: OUTPATIENT SERVICES | Facility: HOSPITAL | Age: 61
LOS: 1 days | End: 2021-02-22
Payer: MEDICARE

## 2021-02-22 VITALS
RESPIRATION RATE: 18 BRPM | TEMPERATURE: 98.6 F | OXYGEN SATURATION: 98 % | HEART RATE: 105 BPM | BODY MASS INDEX: 28.63 KG/M2 | WEIGHT: 200 LBS | HEIGHT: 70 IN | SYSTOLIC BLOOD PRESSURE: 108 MMHG | DIASTOLIC BLOOD PRESSURE: 74 MMHG

## 2021-02-22 DIAGNOSIS — U07.1 COVID-19: ICD-10-CM

## 2021-02-22 DIAGNOSIS — R91.8 OTHER NONSPECIFIC ABNORMAL FINDING OF LUNG FIELD: ICD-10-CM

## 2021-02-22 PROCEDURE — 71046 X-RAY EXAM CHEST 2 VIEWS: CPT | Mod: 26

## 2021-02-22 PROCEDURE — 99024 POSTOP FOLLOW-UP VISIT: CPT

## 2021-02-27 LAB
CULTURE RESULTS: SIGNIFICANT CHANGE UP
SPECIMEN SOURCE: SIGNIFICANT CHANGE UP

## 2021-03-01 ENCOUNTER — NON-APPOINTMENT (OUTPATIENT)
Age: 61
End: 2021-03-01

## 2021-03-02 ENCOUNTER — LABORATORY RESULT (OUTPATIENT)
Age: 61
End: 2021-03-02

## 2021-03-02 ENCOUNTER — APPOINTMENT (OUTPATIENT)
Dept: DISASTER EMERGENCY | Facility: CLINIC | Age: 61
End: 2021-03-02

## 2021-03-03 LAB — SARS-COV-2 N GENE NPH QL NAA+PROBE: NOT DETECTED

## 2021-03-05 ENCOUNTER — OUTPATIENT (OUTPATIENT)
Dept: OUTPATIENT SERVICES | Facility: HOSPITAL | Age: 61
LOS: 1 days | End: 2021-03-05
Payer: MEDICARE

## 2021-03-05 ENCOUNTER — APPOINTMENT (OUTPATIENT)
Dept: ULTRASOUND IMAGING | Facility: IMAGING CENTER | Age: 61
End: 2021-03-05
Payer: MEDICARE

## 2021-03-05 ENCOUNTER — RESULT REVIEW (OUTPATIENT)
Age: 61
End: 2021-03-05

## 2021-03-05 DIAGNOSIS — J90 PLEURAL EFFUSION, NOT ELSEWHERE CLASSIFIED: ICD-10-CM

## 2021-03-05 PROCEDURE — 76604 US EXAM CHEST: CPT | Mod: 26

## 2021-03-05 PROCEDURE — 76604 US EXAM CHEST: CPT

## 2021-06-03 NOTE — H&P ADULT - NS NEC GEN PE MLT EXAM PC
Received a fax[facility] for [facility] Calder[facility]    INR result dated 11/11/2019 is 2.1   No bruits; no thyromegaly or nodules

## 2021-06-07 ENCOUNTER — NON-APPOINTMENT (OUTPATIENT)
Age: 61
End: 2021-06-07

## 2021-06-07 ENCOUNTER — APPOINTMENT (OUTPATIENT)
Dept: RADIOLOGY | Facility: HOSPITAL | Age: 61
End: 2021-06-07

## 2021-06-07 ENCOUNTER — APPOINTMENT (OUTPATIENT)
Dept: THORACIC SURGERY | Facility: CLINIC | Age: 61
End: 2021-06-07
Payer: MEDICARE

## 2021-06-07 ENCOUNTER — OUTPATIENT (OUTPATIENT)
Dept: OUTPATIENT SERVICES | Facility: HOSPITAL | Age: 61
LOS: 1 days | End: 2021-06-07
Payer: MEDICARE

## 2021-06-07 VITALS
HEIGHT: 70 IN | DIASTOLIC BLOOD PRESSURE: 73 MMHG | HEART RATE: 100 BPM | BODY MASS INDEX: 29.35 KG/M2 | WEIGHT: 205 LBS | SYSTOLIC BLOOD PRESSURE: 102 MMHG | OXYGEN SATURATION: 97 % | RESPIRATION RATE: 18 BRPM | TEMPERATURE: 97.6 F

## 2021-06-07 DIAGNOSIS — J90 PLEURAL EFFUSION, NOT ELSEWHERE CLASSIFIED: ICD-10-CM

## 2021-06-07 PROCEDURE — 71046 X-RAY EXAM CHEST 2 VIEWS: CPT | Mod: 26

## 2021-06-07 PROCEDURE — 99443: CPT

## 2021-06-07 RX ORDER — BUDESONIDE AND FORMOTEROL FUMARATE DIHYDRATE 160; 4.5 UG/1; UG/1
AEROSOL RESPIRATORY (INHALATION)
Refills: 0 | Status: DISCONTINUED | COMMUNITY
End: 2021-06-07

## 2021-06-07 NOTE — PHYSICAL EXAM
[] : no respiratory distress [Auscultation Breath Sounds / Voice Sounds] : lungs were clear to auscultation bilaterally [Heart Rate And Rhythm] : heart rate was normal and rhythm regular [Heart Sounds] : normal S1 and S2 [Heart Sounds Gallop] : no gallops [Murmurs] : no murmurs [Heart Sounds Pericardial Friction Rub] : no pericardial rub [Examination Of The Chest] : the chest was normal in appearance [Chest Visual Inspection Thoracic Asymmetry] : no chest asymmetry [Diminished Respiratory Excursion] : normal chest expansion

## 2021-06-10 NOTE — ASSESSMENT
[FreeTextEntry1] : Mr. JAME CHAIDEZ, 61 year old male, Former smoker (1 PPD x 5 years; Quit 1990), w/ hx of CKD, pleural effusion and Aspiration pneumonia (x 5 in 2019), who was hospitalized late October 2020 for aspiration pneumonia.\par \par Bronchoscopy on 12/2/20;RUL biopsy- no evidence of neoplasm. Fragments of vegetable matter consistent with food material- compatible with aspiration. Actinomyces colony noted, consistent with oral contaminant. RUL brushings and BAL negative for malignant cells, + for foreign debris and vegetable matter, compatible with aspiration PNA.\par \par Patient is s/p Bronch, Rt VATS, thoracotomy, right upper lobe lobectomy on 1/7/21. Path of LN, level 10 reactive with focal non-necrotizing granulomatous inflammation. Path of RUL wedge resection revealing Acute and chronic bronchopneumonia with organization. \par \par I have reviewed the patient's medical records and diagnostic images at time of this office consultation and have made the following recommendation:\par 1. CXR reviewed and explained to patient, patient is doing well, I recommended patient to return to office in 3 months with CT chest w/o contrast. \par 	\par I personally performed the services described in the documentation, reviewed the documentation recorded by the scribe in my presence and it accurately and completely records my words and actions.\par \par I, Sugar Agarwal NP, am scribing for and the presence of HOLLIS Smith, the following sections HISTORY OF PRESENT ILLNESS, PAST MEDICAL/FAMILY/SOCIAL HISTORY; REVIEW OF SYSTEMS; VITAL SIGNS; PHYSICAL EXAM; DISPOSITION.

## 2021-06-10 NOTE — HISTORY OF PRESENT ILLNESS
[Home] : at home, [unfilled] , at the time of the visit. [Medical Office: ([facility])___] : at the medical office located in  [Verbal consent obtained from patient] : the patient, [unfilled] [FreeTextEntry1] : Mr. JAME CHAIDEZ, 61 year old male, Former smoker (1 PPD x 5 years; Quit 1990), w/ hx of CKD, pleural effusion and Aspiration pneumonia (x 5 in 2019), who was hospitalized late October 2020 for aspiration pneumonia. In hospital, evaluated by speech and swallow, who recommended change in diet consistency. He was discharged home on oral abx and instructed to follow up with pulm; Found to have endobronchial lesion in the RUL bronchus. Referred to by Dr. Guerra for possible resection.\par \par Bronchoscopy on 12/2/20;RUL biopsy- no evidence of neoplasm. Fragments of vegetable matter consistent with food material- compatible with aspiration. Actinomyces colony noted, consistent with oral contaminant. RUL brushings and BAL negative for malignant cells, + for foreign debris and vegetable matter, compatible with aspiration PNA.\par \par Patient is s/p Bronch, Rt VATS, thoracotomy, right upper lobe lobectomy on 1/7/21. Path of LN, level 10 reactive with focal non-necrotizing granulomatous inflammation. Path of RUL wedge resection revealing Acute and chronic bronchopneumonia with organization. \par \par Post op - Patient COVID positive with fever and mild respiratory symptoms. \par \par CXR on 02/22/2021:  + moderate right pleural effusion. I recommended patient to have a Right thoracentesis by Dr. Vance Melgar and RTC in 4 weeks with CXR.\par \par Patient was evaluated by Dr. Vance Melgar on 03/05/2021. US on 03/05/2021 revealed There are no effusions and Thoracentesis was not performed.\par \par CXR today: right pleural thickening unchanged. Bibasilar atelectasis or scarring. \par \par Patient is here today for a follow up. Patient denies shortness of breath, cough, chest pain, fever, chills. \par

## 2021-06-10 NOTE — CONSULT LETTER
[Dear  ___] : Dear  [unfilled], [Consult Letter:] : I had the pleasure of evaluating your patient, [unfilled]. [( Thank you for referring [unfilled] for consultation for _____ )] : Thank you for referring [unfilled] for consultation for [unfilled] [Please see my note below.] : Please see my note below. [Consult Closing:] : Thank you very much for allowing me to participate in the care of this patient.  If you have any questions, please do not hesitate to contact me. [Sincerely,] : Sincerely, [FreeTextEntry2] : Dr. BEE Guerra (Pulm/Ref) \par \par  [FreeTextEntry3] : Roberto Daly MD \par Attending Surgeon \par Division of Thoracic Surgery \par , Cardiovascular and Thoracic Surgery \par [facility] [employer] [facility]\par \par

## 2021-07-19 NOTE — ED ADULT TRIAGE NOTE - WEIGHT METHOD
stated

## 2021-08-10 NOTE — PATIENT PROFILE ADULT - NSPROGENBLOODRESTRICT_GEN_A_NUR
The patient's daughter Thelma Frias called stating the patient is still having a lot of swelling on left side. The patient was switched to torsemide and it doesn't seem to be working very well. The patient is requesting to go back on Lasix. Please call Thelma Frias back at [phone] to advise.         [address], [place] [address] [phone]  unable to assess - patient intubated/none

## 2021-10-28 NOTE — PROVIDER CONTACT NOTE (OTHER) - REASON
Chief Complaint   Patient presents with    Follow Up After Procedure     CATH     Patient presents for follow up medical evaluation. Patient is followed on a regular basis by Dr. Lisa Blackwood. Pt denies chest pain, shortness of breath, nausea, vomiting, diarrhea, constipation, motor weakness, insomnia, weight loss, syncope, dizziness, lightheadedness, PND, orthopnea, or claudication. Compliant with meds. S/P 2 D echo which showed mild AS with an estimated ARCHANA of 1.6cm2, with a mean Gradient of 8mmHg, mild to moderate AI, mild LVH, and grade I diastolic dysfunction. Aortic root was 4.0cm.     9-27-12: as above. Patient developed muscle aches in the arms and stoppped Zocor for a while. He restarted it and symptoms returned. Pt denies chest pain, dyspnea, dyspnea on exertion, change in exercise capacity, fatigue, nausea, vomiting, diarrhea, constipation, motor weakness, insomnia, weight loss, syncope, dizziness, lightheadedness, palpitations, PND, orthopnea, or claudication. Patient remains very active in martial arts. 3-28-13: as above, s/p echo with normal LVF, Mild AS and AI, aortic root measuring 4.01cm. CT of chest showed ascending aortic aneurysm measuring 4.2cm. Doing well overall, no recent hospitalization, exercising on a regular without any difficulties. Pt denies chest pain, dyspnea, dyspnea on exertion, change in exercise capacity, fatigue, nausea, vomiting, diarrhea, constipation, motor weakness, insomnia, weight loss, syncope, dizziness, lightheadedness, palpitations, PND, orthopnea, or claudication. 9-24-13: as above, stopped pravachol due to joint aches. Could not tolerate. Pt denies chest pain, dyspnea, dyspnea on exertion, change in exercise capacity, fatigue,  nausea, vomiting, diarrhea, constipation, motor weakness, insomnia, weight loss, syncope, dizziness, lightheadedness, palpitations, PND, orthopnea, or claudication.      4-15-14: as above, Pt denies chest pain, dyspnea, dyspnea on exertion, cough/SOB change in exercise capacity, fatigue,  nausea, vomiting, diarrhea, constipation, motor weakness, insomnia, weight loss, syncope, dizziness, lightheadedness, palpitations, PND, orthopnea, or claudication. Compliant with meds. BP under good control. 5-1-14; as above, s/p echo with normal LVf, grade II DD, mild LVH mild to moderate AS, peak of 38, mean of 18, ARCHANA 1.45cm2, 1-2+ AI, Aneurysmal ascending aorta measuring 4.2cm  Pt denies chest pain, dyspnea, dyspnea on exertion, change in exercise capacity, fatigue,  nausea, vomiting, diarrhea, constipation, motor weakness, insomnia, weight loss, syncope, dizziness, lightheadedness, palpitations, PND, orthopnea, or claudication. 11-13-14: as above, Pt denies chest pain, dyspnea, dyspnea on exertion, change in exercise capacity, fatigue,  nausea, vomiting, diarrhea, constipation, motor weakness, insomnia, weight loss, syncope, dizziness, lightheadedness, palpitations, PND, orthopnea, or claudication. No hospitalizations. No LE edema. States he's active. 6-4-15: as above, s/p CT of chest with ascending aorta arch aneurysm measuring 4.6cm. S/p Echo with EF of 65%, grade II DD, mild AS, AI and MR, ascending aorta measuring 4.4cm. Pt denies chest pain, dyspnea, dyspnea on exertion, change in exercise capacity, fatigue,  nausea, vomiting, diarrhea, constipation, motor weakness, insomnia, weight loss, syncope, dizziness, lightheadedness, palpitations, PND, orthopnea, or claudication. 12-10-15: doing well. Pt denies chest pain, dyspnea, dyspnea on exertion, change in exercise capacity, fatigue,  nausea, vomiting, diarrhea, constipation, motor weakness, insomnia, weight loss, syncope, dizziness, lightheadedness, palpitations, PND, orthopnea, or claudication. No hospitalizations. BP is good today. Having muscle aches and joint pains from statin and stopped pravachol.  Has not had lipid profile since 2012.     6-9-16: Pt denies chest pain, dyspnea, dyspnea on exertion, change in exercise capacity, fatigue,  nausea, vomiting, diarrhea, constipation, motor weakness, insomnia, syncope, dizziness, lightheadedness, palpitations, PND, orthopnea, or claudication. Has lost some weight intentionally. BP is good, HR is good. No LE edema. Lipid profile in 2015 was ok. 6-20-16: CTA of chest with unchanged ascending aorta aneurysm measuring 4.5cm as compared to previous exam on 6/2015.s s/p ECHO with EF of 55%, moderate AS with peak velocity of 3.2m/sec, peak of 41mmHg, mean of 21mmHg, ARCHANA of 1.2cm2. Trace MR, mild AI, moderate LVH, RVSP of 36mmHg. Pt denies chest pain, dyspnea, dyspnea on exertion, change in exercise capacity, fatigue,  nausea, vomiting, diarrhea, constipation, motor weakness, insomnia, weight loss, syncope, dizziness, lightheadedness, palpitations, PND, orthopnea, or claudication. BP is good. 12-29-16: Pt denies chest pain, dyspnea, dyspnea on exertion, change in exercise capacity, fatigue,  nausea, vomiting, diarrhea, constipation, motor weakness, insomnia, weight loss, syncope, dizziness, lightheadedness, palpitations, PND, orthopnea, or claudication. BP and hr are good. No LE discoloration or ulcers. No LE edema. No CHF type symptoms. Lipid profile is abnormal lat check in 12/2015. Not able to tolerate statins. Has tried zocor, pravachol. He gets bad muscle aches. Does have a hx of a mini stroke/TIA.     6-29-17: Pt denies chest pain, dyspnea, dyspnea on exertion, change in exercise capacity, fatigue,  nausea, vomiting, diarrhea, constipation, motor weakness, insomnia, weight loss, syncope, dizziness, lightheadedness, palpitations, PND, orthopnea, or claudication. No nitro use. BP and hr are good. CAD is stable. No LE discoloration or ulcers. No LE edema. No CHF type symptoms. Lipid profile is normal.       S/p ECHO. Normal mitral valve structure and function.   The aortic valve area is 0.9 cm2 with a maximum gradient of 52mmHg and a mean gradient of 24mmHg. Moderately severe AS. Normal tricuspid valve structure and function. There is mild ( 1 +) tricuspid regurgitation with estimated RVSP of 40 mm Hg. Normal pulmonic valve structure and function. Moderately dilated left atrium. Left ventricular ejection fraction is visually estimated at 55%. Pseudonormal filling pattern noted. Moderate concentric left ventricular hypertrophy. Normal right atrium. Normal right ventricle structure and function. Dilation of ascending aorta 4.9 cm  No evidence of pericardial effusion. No evidence of pleural effusion. S/p CTA of chest.          Impression   1. STABLE 4.5 CM ASCENDING THORACIC AORTA ANEURYSM UNCHANGED FROM 6/21/2016.   2. NO CTA EVIDENCE OF A PULMONARY EMBOLISM OR AORTIC DISSECTION. 3. MILD CARDIOMEGALY WITHOUT A PERICARDIAL EFFUSION. 4. LUNGS APPEAR CLEAR.             12-28-17: FOLLOWING WITH DR. Jaun Tuttle NOW. Pt denies chest pain, dyspnea, dyspnea on exertion, change in exercise capacity, fatigue,  nausea, vomiting, diarrhea, constipation, motor weakness, insomnia, weight loss, syncope, dizziness, lightheadedness, palpitations, PND, orthopnea, or claudication. No nitro use. BP and hr are good. CAD is stable. No LE discoloration or ulcers. No LE edema. No CHF type symptoms. Lipid profile is normal.  He is active at work and home without any issues. Compliant with meds. Able to tolerate 10mg lipitor now. No muscle cramps. 6-28-18: EKG with NSR, LBBB. S/p ECHO with EF of 55%, MILD AS, MILD AI, GRADE II DD. S/P CTA of chest with ascending aorta measuring 4.6cm, unchanged from previous. Pt denies chest pain, dyspnea, dyspnea on exertion, change in exercise capacity, fatigue,  nausea, vomiting, diarrhea, constipation, motor weakness, insomnia, weight loss, syncope, dizziness, lightheadedness, palpitations, PND, orthopnea, or claudication. No nitro use. BP and hr are good. CAD is stable. No LE discoloration or ulcers. No LE edema.  No CHF type symptoms. Lipid profile is normal. No recent hospitalization. No change in meds. 12-27-18: doing well overall. Pt denies chest pain, dyspnea, dyspnea on exertion, change in exercise capacity, fatigue,  nausea, vomiting, diarrhea, constipation, motor weakness, insomnia, weight loss, syncope, dizziness, lightheadedness, palpitations, PND, orthopnea, or claudication. No nitro use. BP and hr are good. No LE discoloration or ulcers. No LE edema. No CHF type symptoms. Lipid profile is normal. No recent hospitalization. No change in meds. Able to tolerate Lipitor so far this time. 6-27-19: Pt denies chest pain, dyspnea, dyspnea on exertion, change in exercise capacity, fatigue,  nausea, vomiting, diarrhea, constipation, motor weakness, insomnia, weight loss, syncope, dizziness, lightheadedness, palpitations, PND, orthopnea, or claudication. No nitro use. BP and hr are good. CAD is stable. No LE discoloration or ulcers. No LE edema. No CHF type symptoms. Lipid profile is normal. No recent hospitalization. No change in meds. Has CKD, stage III. EKG with NSR, LBBB. S/p CTA of chest with stable Ectatic ascending thoracic aorta with maximum diameter of 4.4 cm. S/p ECHO with EF of 60%, 1-2+ AI, 2+ TR, RVSP of 49mmHg, mild to mod PHNT, grade I DD, moderate AS, mean gradient of 33mmHg, velocity of 3.49m/s, 0.95cm2 ARCHANA. 12-19-19: Pt denies chest pain, dyspnea, dyspnea on exertion, change in exercise capacity, fatigue,  nausea, vomiting, diarrhea, constipation, motor weakness, insomnia, weight loss, syncope, dizziness, lightheadedness, palpitations, PND, orthopnea, or claudication. No nitro use. BP and hr are good. CAD is stable. No LE discoloration or ulcers. No LE edema. No CHF type symptoms. Lipid profile is normal. No recent hospitalization. No change in meds. Hx of bicuspid AV and Ascending aorta aneurysm. Could not tolerate Atorvastatin due to muscle aches/joint pain.  LDL is 81.     1/5/2021: Patient with history of thoracic aortic aneurysm as well as bicuspid aortic valve with  moderate aortic stenosis. Pt denies chest pain, dyspnea, dyspnea on exertion, change in exercise capacity, fatigue,  nausea, vomiting, diarrhea, constipation, motor weakness, insomnia, weight loss, syncope, dizziness, lightheadedness, palpitations, PND, orthopnea, or claudication. Blood pressure is mildly elevated at 142/88 today. Patient with history of bicuspid aortic valve. EKG with sinus bradycardia with left bundle branch block. Blood pressure is usually pretty good at home. No recent hospitalizations. Patient is active without any issues. 8-3-21: s/p ECHO in 1/2021 with EF of 45-50%, severe AS, peak of 72, mean of 23mmhg, ARCHANA 0.85cm2,   S/p CUS with less than 50% b/l stenosis. Pt denies chest pain, dyspnea, dyspnea on exertion, change in exercise capacity, fatigue,  nausea, vomiting, diarrhea, constipation, motor weakness, insomnia, weight loss, syncope, dizziness, lightheadedness, palpitations, PND, orthopnea, or claudication. . BP and hr are good. No LE discoloration or ulcers. No LE edema. No CHF type symptoms. Lipid profile is normal. No recent hospitalization. No change in meds. EKG with NSR, LBBB. 9-14-21: Status post echocardiogram with ejection fraction of 60%, severe aortic stenosis with a velocity of 4.7 m/s, a peak of 88 mils mercury, mean of 54 mils mercury with a valve area of 0.71 cm². Patient with a known history of bicuspid aortic valve mild pulmonary tension with an RVSP of 48 mmHg. Status post CT of the chest showing a sending aorta aneurysm measuring up to 4.6 cm. Pt denies chest pain, dyspnea, dyspnea on exertion, change in exercise capacity, fatigue,  nausea, vomiting, diarrhea, constipation, motor weakness, insomnia, weight loss, syncope, dizziness, lightheadedness, palpitations, PND, orthopnea, or claudication.     10/28/2021: Status post cardiac catheterization showing 80% mid LAD stenosis followed by another 60 to 70% tandem stenosis. Otherwise mild CAD, aortic valve not able to be crossed. Patient will be following up with cardiothoracic surgery at [facility] on November 2 for aortic valve replacement, CABG and root repair possibly. Does have some fatigue/shortness of breath with going up the steps/moderate activity      Patient Active Problem List   Diagnosis    HTN (hypertension)    Dyslipidemia    Hx-TIA (transient ischemic attack)    Rheumatic fever    Seasonal allergies    Aortic stenosis, mild    Pulmonary hypertension-moderate    LBBB (left bundle branch block)    Aortic valve [id number]+    Ascending aortic aneurysm (HCC)    Statin intolerance    Hyperplastic polyp of descending colon    Severe aortic stenosis    Coronary artery disease involving native coronary artery of native heart without angina pectoris       Current Outpatient Medications   Medication Sig Dispense Refill    lisinopril (PRINIVIL;ZESTRIL) 10 MG tablet Take 1 tablet by mouth daily 90 tablet 3    metoprolol tartrate (LOPRESSOR) 25 MG tablet Take 1 tablet by mouth 2 times daily 180 tablet 3    Cholecalciferol (VITAMIN D) 2000 units TABS tablet Take 1 tablet by mouth daily 90 tablet 1    aspirin 81 MG EC tablet Take 81 mg by mouth daily. No current facility-administered medications for this visit. ALLERGIES: Statins    Review of Systems:  General ROS: negative  Psychological ROS: negative  Hematological and Lymphatic ROS: No history of blood clots or bleeding disorder.    Respiratory ROS: no cough, shortness of breath, or wheezing  Cardiovascular ROS: no chest pain or dyspnea on exertion  Gastrointestinal ROS: no abdominal pain, change in bowel habits, or black or bloody stools  Genito-Urinary ROS: negative  Musculoskeletal ROS: negative  Neurological ROS: no TIA or stroke symptoms  Dermatological ROS: neg      VITALS:  Blood pressure 126/84, pulse 64, temperature 97.3 °F (36.3 °C), temperature source Infrared, weight 134 lb (60.8 kg), SpO2 96 %. Body mass index is 20.99 kg/m². Physical Examination:  General appearance - alert, well appearing, and in no distress  Mental status - alert, oriented to person, place, and time  Neck - Neck is supple, no JVD or carotid bruits. No thyromegaly or adenopathy. Chest - clear to auscultation, no wheezes, rales or rhonchi, symmetric air entry  Heart - normal rate, regular rhythm, normal S1, S2, + 3/6 systolic murmurs, rubs, clicks or gallops  Abdomen - soft, nontender, nondistended, no masses or organomegaly  Neurological - alert, oriented, normal speech, no focal findings or movement disorder noted  Extremities - peripheral pulses normal, no pedal edema, no clubbing or cyanosis  Skin - normal coloration and turgor, no rashes, no suspicious skin lesions noted    LABS:    Chemistry        Component Value Date/Time     09/27/2021 0826    K 4.2 09/27/2021 0826     09/27/2021 0826    CO2 28 09/27/2021 0826    BUN 18 09/27/2021 0826    CREATININE 1.27 (H) 09/27/2021 0826        Component Value Date/Time    CALCIUM 9.4 09/27/2021 0826    ALKPHOS 63 01/16/2020 0910    AST 27 01/16/2020 0910    ALT 16 01/16/2020 0910    BILITOT 1.5 (H) 01/16/2020 0910            Lab Results   Component Value Date    WBC 6.7 09/27/2021    HGB 16.2 09/27/2021    HCT 47.5 09/27/2021    MCV 85.0 09/27/2021     09/27/2021       No components found for: CHLPL  Lab Results   Component Value Date    TRIG 72 08/04/2021    TRIG 90 01/16/2020    TRIG 125 12/28/2018     Lab Results   Component Value Date    HDL 53 08/04/2021    HDL 62 (H) 01/16/2020    HDL 43 12/28/2018     Lab Results   Component Value Date    LDLCALC 124 08/04/2021    LDLCALC 140 (H) 01/16/2020    LDLCALC 81 12/28/2018       EKG: NSR, LBBB. ASSESSMENT:     Diagnosis Orders   1. Aortic stenosis, moderate    2.  Essential hypertension  lisinopril (PRINIVIL;ZESTRIL) 10 MG tablet metoprolol tartrate (LOPRESSOR) 25 MG tablet   3. Ascending aortic aneurysm ([address]     4. Aortic valve insufficiency, etiology of cardiac valve disease unspecified     5. Pulmonary hypertension-moderate     6. LBBB (left bundle branch block)     7. Statin intolerance           PLAN:         As always, aggressive risk factor modification is strongly recommended. We should adhere to the [address] VII guidelines for HTN management and the NCEP ATP III guidelines for LDL-C management. The nature of cardiac risk has been fully discussed with this patient. I have made him aware of his LDL target goal given his cardiovascular risk analysis. I have discussed the appropriate diet. The need for lifelong compliance in order to reduce risk is stressed. A regular exercise program is recommended to help achieve and maintain normal body weight, fitness and improve lipid balance. Continue medications at current doses. Follow-up with cardiothoracic surgery at [facility]. Patient was advised and encouraged to check blood pressure at home or at a pharmacy, maintain a logbook, and also call us back if blood pressure are above the target ranges or if it is low. Patient clearly understands and agrees to the instructions. We will need to continue to monitor muscle and liver enzymes, BUN, CR, and electrolytes. Details of medical condition explained and patient was warned about adverse consequences of uncontrolled medical conditions and possible side effects of prescribed medications. Patient was advised to go to the ER if he starts experiencing adverse effects of the medications. patient was instructed to call us back or go to nearby emergency room immediately if symptoms get worse or do not improve. Thank you for allowing me to participate in the care of your patient, please don't hesitate to contact me if you have any further questions.

## 2021-11-10 NOTE — PHYSICAL THERAPY INITIAL EVALUATION ADULT - ADDITIONAL COMMENTS
Proper hand hygiene was performed/Sterile gloves were worn for the duration of the procedure/A sterile drape was used to cover all adjacent areas/The site was cleaned with soap/water and sterile solution (betadine)/The catheter was appropriately lubricated/The catheter was secured with a securement device (e.g. StatLock)/The urinary drainage system is closed at the end of the procedure/The collection bag is below the level of the patient and urinary bladder/All applicable medical record documentation is completed
Pt lives alone in [place] supported apartment /c no steps to enter and no steps inside. Pt was independent without an assistive device PTA for mobility and ADLs. Pt drives and denies having any adaptive or assistive devices.

## 2021-11-17 NOTE — PROGRESS NOTE ADULT - PROBLEM SELECTOR PROBLEM 1
Pneumonia
Abdominal pain, unspecified abdominal location
Pneumonia
Attn - fall with facial injury including laceration to nose and ICH - SDH and SAH, neuro intact.  NSurg consult and repeat CT head at 3am and 9am, neuro checks. reassess.  Tylenol and Zofran.
Pneumonia

## 2022-03-11 NOTE — ED ADULT NURSE NOTE - NSFALLRSKINDICATORS_ED_ALL_ED
February 14, 2022      Manjinder Esparza  [address] [place] [address]        Dear Manjinder,     Please be aware that coverage of these services is subject to the terms and limitations of your health insurance plan.  Call member services at your health plan with any benefit or coverage questions.    Thank you for choosing [facility]. You are scheduled for the following service(s):    Date:  3/11/2022             Procedure:  COLONOSCOPY  Doctor:        Dr. Villalobos   Arrival Time:  8:45 AM  *Enter and check in at the Main Hospital Entrance*  Procedure Time:  9:30 AM      Location:   [facility]        Endoscopy Department, First Floor         [address] [place], [place] [address]      [phone] or [phone] ([facility]) to reschedule      MIRALAX -GATORADE  PREP  Colonoscopy is the most accurate test to detect colon polyps and colon cancer; and the only test where polyps can be removed. During this procedure, a doctor examines the lining of your large intestine and rectum through a flexible tube.   Transportation  You must arrange for a ride for the day of your procedure with a responsible adult. A taxi , Uber, etc, is not an option unless you are accompanied by a responsible adult. If you fail to arrange transportation with a responsible adult, your procedure will be cancelled and rescheduled.    Purchase the  following supplies at your local pharmacy:  - 2 (two) bisacodyl tablets: each tablet contains 5 mg.  (Dulcolax  laxative NOT Dulcolax  stool softener)   - 1 (one) 8.3 oz bottle of Polyethylene Glycol (PEG) 3350 Powder   (MiraLAX , Smooth LAX , ClearLAX  or equivalent)  - 64 oz Gatorade    Regular Gatorade, Gatorade G2 , Powerade , Powerade Zero  or Pedialyte  is acceptable. Red colored flavors are not allowed; all other colors (yellow, green, orange, purple and blue) are okay. It is also okay to buy two 2.12 oz packets of powdered Gatorade  that can be mixed with water to a total volume of 64 oz of liquid.  - 1 (one) 10 oz bottle of Magnesium Citrate (Red colored flavors are not allowed)  It is also okay for you to use a 0.5 oz package of powdered magnesium citrate (17 g) mixed with 10 oz of water.    PREPARATION FOR COLONOSCOPY  7 days before:    Discontinue fiber supplements and medications containing iron. This includes Metamucil  and Fibercon ; and multivitamins with iron.    3 days before:    Begin a low-fiber diet. A low-fiber diet helps making the cleanout more effective.     Examples of a low-fiber diet include (but are not limited to): white bread, white rice, pasta, crackers, fish, chicken, eggs, ground beef, creamy peanut butter, cooked/steamed/boiled vegetables, canned fruit, bananas, melons, milk, plain yogurt cheese, salad dressing and other condiments.     The following are not allowed on a low-fiber diet: seeds, nuts, popcorn, bran, whole wheat, corn, quinoa, raw fruits and vegetables, berries and dried fruit, beans and lentils.    For additional details on low-fiber diet, please refer to the table on the last page.    2 days before:    Continue the low-fiber diet.     Drink at least 8 glasses of water throughout the day.     Stop eating solid foods at 11:45 pm.    1 day before:    In the morning: begin a clear liquid diet (liquids you can see through).     Examples of a clear liquid diet include: water, clear broth or bouillon, Gatorade, Pedialyte or Powerade, carbonated and non-carbonated soft drinks (Sprite , 7-Up , ginger ale), strained fruit juices without pulp (apple, white grape, white cranberry), Jell-O  and popsicles.     The following are not allowed on a clear liquid diet: red liquids, alcoholic beverages, dairy products (milk, creamer, and yogurt), protein shakes, creamy broths, juice with pulp and chewing tobacco.    At noon: take 2 (two) bisacodyl tablets     At 4 (and no later than 6pm): start drinking the  Miralax-Gatorade preparation (8.3 oz of Miralax mixed with 64 oz of Gatorade in a large pitcher). Drink 1(one) 8 oz glass every 15 minutes thereafter, until the mixture is gone.    COLON CLEANSING TIPS: drink adequate amounts of fluids before and after your colon cleansing to prevent dehydration. Stay near a toilet because you will have diarrhea. Even if you are sitting on the toilet, continue to drink the cleansing solution every 15 minutes. If you feel nauseous or vomit, rinse your mouth with water, take a 15 to 30-minute-break and then continue drinking the solution. You will be uncomfortable until the stool has flushed from your colon (in about 2 to 4 hours). You may feel chilled.    Day of your procedure  You may take all of your morning medications including blood pressure medications, blood thinners (if you have not been instructed to stop these by our office), methadone, anti-seizure medications with sips of water 3 hours prior to your procedure or earlier. Do not take insulin or vitamins prior to your procedure. Continue the clear liquid diet.       4 hours prior: drink 10 oz of magnesium citrate. It may be easier to drink it with a straw.    STOP consuming all liquids after that.     Do not take anything by mouth during this time.     Allow extra time to travel to your procedure as you may need to stop and use a restroom along the way.    You are ready for the procedure, if you followed all instructions and your stool is no longer formed, but clear or yellow liquid. If you are unsure whether your colon is clean, please call our office at [phone] before you leave for your appointment.    Bring the following to your procedure:  - Insurance Card/Photo ID.   - List of current medications including over-the-counter medications and supplements.   - Your rescue inhaler if you currently use one to control asthma.    Canceling or rescheduling your appointment:   If you must cancel or reschedule your  appointment, please call [phone] as soon as possible.    COLONOSCOPY PRE-PROCEDURE CHECKLIST    If you have diabetes, ask your regular doctor for diet and medication restrictions.  If you take an anticoagulant or anti-platelet medication (such as Coumadin , Lovenox , Pradaxa , Xarelto , Eliquis , etc.), please call your primary doctor for advice on holding this medication.  If you take aspirin you may continue to do so.  If you are or may be pregnant, please discuss the risks and benefits of this procedure with your doctor.        What happens during a colonoscopy?    Plan to spend up to two hours, starting at registration time, at the endoscopy center the day of your procedure. The colonoscopy takes an average of 15 to 30 minutes. Recovery time is about 30 minutes.      Before the exam:    You will change into a gown.    Your medical history and medication list will be reviewed with you, unless that has been done over the phone prior to the procedure.     A nurse will insert an intravenous (IV) line into your hand or arm.    The doctor will meet with you and will give you a consent form to sign.  During the exam:     Medicine will be given through the IV line to help you relax.     Your heart rate and oxygen levels will be monitored. If your blood pressure is low, you may be given fluids through the IV line.     The doctor will insert a flexible hollow tube, called a colonoscope, into your rectum. The scope will be advanced slowly through the large intestine (colon).    You may have a feeling of fullness or pressure.     If an abnormal tissue or a polyp is found, the doctor may remove it through the endoscope for closer examination, or biopsy. Tissue removal is painless    After the exam:           Any tissue samples removed during the exam will be sent to a lab for evaluation. It may take 5-7 working days for you to be notified of the results.     A nurse will provide you with complete discharge instructions  before you leave the endoscopy center. Be sure to ask the nurse for specific instructions if you take blood thinners such as Aspirin, Coumadin or Plavix.     The doctor will prepare a full report for you and for the physician who referred you for the procedure.     Your doctor will talk with you about the initial results of your exam.      Medication given during the exam will prohibit you from driving for the rest of the day.     Following the exam, you may resume your normal diet. Your first meal should be light, no greasy foods. Avoid alcohol until the next day.     You may resume your regular activities the day after the procedure.         LOW-FIBER DIET    Foods RECOMMENDED Foods to AVOID   Breads, Cereal, Rice and Pasta:   White bread, rolls, biscuits, croissant and paul toast.   Waffles, Yi toast and pancakes.   White rice, noodles, pasta, macaroni and peeled cooked potatoes.   Plain crackers and saltines.   Cooked cereals: farina, cream of rice.   Cold cereals: Puffed Rice , Rice Krispies , Corn Flakes  and Special K    Breads, Cereal, Rice and Pasta:   Breads or rolls with nuts, seeds or fruit.   Whole wheat, pumpernickel, rye breads and cornbread.   Potatoes with skin, brown or wild rice, and kasha (buckwheat).     Vegetables:   Tender cooked and canned vegetables without seeds: carrots, asparagus tips, green or wax beans, pumpkin, spinach, lima beans. Vegetables:   Raw or steamed vegetables.   Vegetables with seeds.   Sauerkraut.   Winter squash, peas, broccoli, Brussel sprouts, cabbage, onions, cauliflower, baked beans, peas and corn.   Fruits:   Strained fruit juice.   Canned fruit, except pineapple.   Ripe bananas and melon. Fruits:   Prunes and prune juice.   Raw fruits.   Dried fruits: figs, dates and raisins.   Milk/Dairy:   Milk: plain or flavored.   Yogurt, custard and ice cream.   Cheese and cottage cheese Milk/Dairy:     Meat and other proteins:   ground, well-cooked tender beef, lamb, ham,  veal, pork, fish, poultry and organ meats.   Eggs.   Peanut butter without nuts. Meat and other proteins:   Tough, fibrous meats with gristle.   Dry beans, peas and lentils.   Peanut butter with nuts.   Tofu.   Fats, Snack, Sweets, Condiments and Beverages:   Margarine, butter, oils, mayonnaise, sour cream and salad dressing, plain gravy.   Sugar, hard candy, clear jelly, honey and syrup.   Spices, cooked herbs, bouillon, broth and soups made with allowed vegetable, ketchup and mustard.   Coffee, tea and carbonated drinks.   Plain cakes, cookies and pretzels.   Gelatin, plain puddings, custard, ice cream, sherbet and popsicles. Fats, Snack, Sweets, Condiments and Beverages:   Nuts, seeds and coconut.   Jam, marmalade and preserves.   Pickles, olives, relish and horseradish.   All desserts containing nuts, seeds, dried fruit and coconut; or made from whole grains or bran.   Candy made with nuts or seeds.   Popcorn.   DIRECTIONS TO THE ENDOSCOPY DEPARTMENT    From the north ([facility])  Take [address] South, exit on [address] [address]. Get into the left hand emigdio, turn left (east), go one-half mile to [address] and turn left. Go north to the second stoplight, take a right on [address] and follow it to the Main Hospital entrance.    From the south ([facility])  Take 35N to the [address]E split and exit on [address] [address]. On [address] [address], turn left (west) to [address]. Turn right (north) on [address]. Go north to the second stoplight, take a right on [address] and follow it to the Main Hospital entrance.    From the east via 35E ([facility])  Take [address] south to [address] [address] exit. Turn right on [address] [address]. Go west to [address]. Turn right (north) on [address]. Go to the second stoplight, take a right on [address] to the Main Hospital entrance.    From the east via [address] ([facility])  Take [facility] [address] to [address].  Turn left (south) on [address] to [address], turn left (east) on [address] and follow it to the Main Hospital entrance.    From the west via [address] ([place])  Take [address] east to [address]. Turn right (south) on [address] to [address], turn left (east) on [address] and follow it to the Main Hospital entrance.   no

## 2022-03-22 NOTE — ED ADULT NURSE NOTE - EAR DISTURBANCES
03/24/22      Michelle Bernal  [id number] [facility] [address]    To Pulmonary Rehab:    Patient has permission to return to pulmonary rehab. States missed appointments were due to self reported illness.     Sincerely,         Tabitha Watson NP  [facility] [address]  Phone: [phone]  Fax: [phone]               normal

## 2022-05-17 NOTE — ED ADULT NURSE REASSESSMENT NOTE - NS ED NURSE REASSESS COMMENT FT1
[FreeTextEntry1] : Patient here for annual examination Pap smear completed mammogram ordered as well as bone density study will continue vitamin D3 iron supplements as she is severely iron deficient and work-up is in progress per GI doctor at [facility].  Patient will return in 12 months. as per brother in law will be picked up.

## 2022-07-02 NOTE — PROVIDER CONTACT NOTE (OTHER) - SITUATION
Pt BP for this AM was 100/62 and has an order of Verapamil for this AM.  Verapamil has no parameters
Pt BP was 105/72 for this morning.  Pt has order of Verapmil for this morning with no parameters.  Pt has been sustaining SBP of 104 and 105 during shift
pt has non productive cough,
Booster status unknown

## 2022-07-05 NOTE — PHYSICAL THERAPY INITIAL EVALUATION ADULT - SITTING BALANCE: STATIC
normal appearance , without tenderness upon palpation , no deformities , trachea midline, no masses , no JVD. fair balance

## 2022-07-07 NOTE — H&P PST ADULT - ENMT
mouth/pharynx detailed exam details… 5-Fu Counseling: 5-Fluorouracil Counseling:  I discussed with the patient the risks of 5-fluorouracil including but not limited to erythema, scaling, itching, weeping, crusting, and pain.

## 2022-07-19 NOTE — DISCHARGE NOTE PROVIDER - NSDCFUADDINST_GEN_ALL_CORE_FT
Continue sending in blood sugar logs to doctor.     Please return to the [facility] for the following: decreased fetal movement, contractions, leakage of fluid, or vaginal bleeding.     Labor and Delivery phone number is [phone].   You have been diagnosed with the COVID-19 virus during your hospital stay. You must self quarantine to complete a 10 day time period.  Monitor for fevers, shortness of breath and cough primarily.  Monitor your temperature daily to not any changes and increases.    It has been determined that you no longer need hospitalization and can recover while remaining in self-quarantine at home. You should follow the prevention steps below until a healthcare provider or local or state health department says you can return to your normal activities.   1. You should restrict activities outside your home, except for getting medical care. 2. Do not go to work, school, or public areas. 3. Avoid using public transportation, ride-sharing, or taxis. 4. Separate yourself from other people and animals in your home. 5. Call ahead before visiting your doctor. 6. Wear a facemask. 7. Cover your coughs and sneezes. 8. Clean your hands often. 9. Avoid sharing personal household items. 10. Clean all “high-touch” surfaces everyday. 11. Monitor your symptoms. If you have a medical emergency and need to call 911, notify the dispatch personnel that you have COVID-19 If possible, put on a facemask before emergency medical services arrive. 12. Stopping home isolation. Patients with confirmed COVID-19 should remain under home isolation precautions for 14 days since the positive COVID-19 test and until the risk of secondary transmission to others is thought to be low. The decision to discontinue home isolation precautions should be made on a case-by-case basis, in consultation with healthcare providers and state and local health departments. Your [facility] Department of Health can be reached at [phone] for further information about COVID-19.    Patient should remain in isolation for a total of 10 days from time of diagnosis. Patient was diagnosed on 1/21/2020 and would be expected to complete isolation on or after /31/2020. Those caring for the patient should maintain strict hand hygiene and avoid touching face as much as possible.   You have been diagnosed with the COVID-19 virus during your hospital stay. You must self quarantine to complete a 10 day time period.  Monitor for fevers, shortness of breath and cough primarily.  Monitor your temperature daily to not any changes and increases.    It has been determined that you no longer need hospitalization and can recover while remaining in self-quarantine at home. You should follow the prevention steps below until a healthcare provider or local or state health department says you can return to your normal activities.   1. You should restrict activities outside your home, except for getting medical care. 2. Do not go to work, school, or public areas. 3. Avoid using public transportation, ride-sharing, or taxis. 4. Separate yourself from other people and animals in your home. 5. Call ahead before visiting your doctor. 6. Wear a facemask. 7. Cover your coughs and sneezes. 8. Clean your hands often. 9. Avoid sharing personal household items. 10. Clean all “high-touch” surfaces everyday. 11. Monitor your symptoms. If you have a medical emergency and need to call 911, notify the dispatch personnel that you have COVID-19 If possible, put on a facemask before emergency medical services arrive. 12. Stopping home isolation. Patients with confirmed COVID-19 should remain under home isolation precautions for 14 days since the positive COVID-19 test and until the risk of secondary transmission to others is thought to be low. The decision to discontinue home isolation precautions should be made on a case-by-case basis, in consultation with healthcare providers and state and local health departments. Your [facility] Department of Health can be reached at [phone] for further information about COVID-19.    Patient should remain in isolation for a total of 10 days from time of diagnosis. Patient was diagnosed on 1/21/2020 and would be expected to complete isolation on or after /31/2020. Those caring for the patient should maintain strict hand hygiene and avoid touching face as much as possible.    Check your oxygen saturation with the pulse oximeter frequently especially if you feel short of breath.  Come to the ER if your pulse oximeter reads less than 90% consistently

## 2022-08-01 ENCOUNTER — APPOINTMENT (OUTPATIENT)
Dept: THORACIC SURGERY | Facility: CLINIC | Age: 62
End: 2022-08-01

## 2022-08-01 VITALS
SYSTOLIC BLOOD PRESSURE: 113 MMHG | RESPIRATION RATE: 18 BRPM | HEART RATE: 89 BPM | HEIGHT: 70 IN | BODY MASS INDEX: 35.07 KG/M2 | WEIGHT: 245 LBS | OXYGEN SATURATION: 92 % | DIASTOLIC BLOOD PRESSURE: 76 MMHG

## 2022-08-01 DIAGNOSIS — J90 PLEURAL EFFUSION, NOT ELSEWHERE CLASSIFIED: ICD-10-CM

## 2022-08-01 DIAGNOSIS — J84.10 PULMONARY FIBROSIS, UNSPECIFIED: ICD-10-CM

## 2022-08-01 PROCEDURE — 99214 OFFICE O/P EST MOD 30 MIN: CPT

## 2022-08-04 NOTE — HISTORY OF PRESENT ILLNESS
[FreeTextEntry1] : Mr. JAME CHAIDEZ, 62 year old male, Former smoker (1 PPD x 5 years; Quit 1990), w/ hx of CKD, pleural effusion and Aspiration pneumonia (x 5 in 2019), who was hospitalized late October 2020 for aspiration pneumonia. In hospital, evaluated by speech and swallow, who recommended change in diet consistency. He was discharged home on oral abx and instructed to follow up with pulm; Found to have endobronchial lesion in the RUL bronchus. Referred to by Dr. Guerra for possible resection.\par \par Bronchoscopy on 12/2/20;RUL biopsy- no evidence of neoplasm. Fragments of vegetable matter consistent with food material- compatible with aspiration. Actinomyces colony noted, consistent with oral contaminant. RUL brushings and BAL negative for malignant cells, + for foreign debris and vegetable matter, compatible with aspiration PNA.\par \par Patient is s/p Bronch, Rt VATS, thoracotomy, right upper lobe lobectomy on 1/7/21. Path of LN, level 10 reactive with focal non-necrotizing granulomatous inflammation. Path of RUL wedge resection revealing Acute and chronic bronchopneumonia with organization. \par \par Post op - Patient COVID positive with fever and mild respiratory symptoms. \par \par CXR on 02/22/2021:  + moderate right pleural effusion. I recommended patient to have a Right thoracentesis by Dr. Vance Melgar and RTC in 4 weeks with CXR.\par \par Patient was evaluated by Dr. Vance Melgar on 03/05/2021. US on 03/05/2021 revealed There are no effusions and Thoracentesis was not performed.\par \par Last seen patient on 06/07/2021, I recommended patient to RTC in 3 months with CT chest w/o contrast. \par \par CT chest on 07/19/2022: \par - Stable appearance of the lungs status post right upper lobectomy\par - Cystic lesion in the pancreatic tail is better evaluated on a prior MRI of the abdomen. MRI follow-up is suggested.\par Patient is here today for a follow up. \par \par Patient is here today for a follow up. Patient c/o bronchitis s/p ABX by PCP. Currently, Patient denies shortness of breath, cough, chest pain, fever, chills.

## 2022-08-04 NOTE — ASSESSMENT
[FreeTextEntry1] : Mr. JAME CHAIDEZ, 62 year old male, Former smoker (1 PPD x 5 years; Quit 1990), w/ hx of CKD, pleural effusion and Aspiration pneumonia (x 5 in 2019), who was hospitalized late October 2020 for aspiration pneumonia. In hospital, evaluated by speech and swallow, who recommended change in diet consistency. He was discharged home on oral abx and instructed to follow up with pulm; Found to have endobronchial lesion in the RUL bronchus. Referred to by Dr. Guerra for possible resection.\par \par Bronchoscopy on 12/2/20;RUL biopsy- no evidence of neoplasm. Fragments of vegetable matter consistent with food material- compatible with aspiration. Actinomyces colony noted, consistent with oral contaminant. RUL brushings and BAL negative for malignant cells, + for foreign debris and vegetable matter, compatible with aspiration PNA.\par \par Patient is s/p Bronch, Rt VATS, thoracotomy, right upper lobe lobectomy on 1/7/21. Path of LN, level 10 reactive with focal non-necrotizing granulomatous inflammation. Path of RUL wedge resection revealing Acute and chronic bronchopneumonia with organization. \par \par Post op - Patient COVID positive with fever and mild respiratory symptoms. \par \par CXR on 02/22/2021:  + moderate right pleural effusion. I recommended patient to have a Right thoracentesis by Dr. Vance Melgar and RTC in 4 weeks with CXR.\par \par Patient was evaluated by Dr. Vance Melgar on 03/05/2021. US on 03/05/2021 revealed There are no effusions and Thoracentesis was not performed.\par \par I have reviewed the patient's medical records and diagnostic images at time of this office consultation and have made the following recommendation:\par 1. CT chest reviewed and explained to patient and his sister over the phone, I recommended patient to return to office as needed. \par 2. F/u with Dr. Guerra\par 3. F/u with PCP regarding cystic lesion in the pancreatic tail. \par I personally performed the services described in the documentation, reviewed the documentation recorded by the scribe in my presence and it accurately and completely records my words and actions.\par \par I, Sugar Agrawal NP, am scribing for and the presence of HOLLIS Smith, the following sections HISTORY OF PRESENT ILLNESS, PAST MEDICAL/FAMILY/SOCIAL HISTORY; REVIEW OF SYSTEMS; VITAL SIGNS; PHYSICAL EXAM; DISPOSITION.\par

## 2022-08-04 NOTE — CONSULT LETTER
[FreeTextEntry2] : Dr. BEE Guerra (Pulm/Ref) \par \par  [FreeTextEntry3] : Roberto Daly MD \par Attending Surgeon \par Division of Thoracic Surgery \par , Cardiovascular and Thoracic Surgery \par [facility] [employer] [facility]\par \par

## 2022-08-05 NOTE — PHYSICAL THERAPY INITIAL EVALUATION ADULT - NAME OF CLINICIAN
Kvng Clinton was seen and treated in our emergency department on 8/5/2022  Diagnosis:     Yovani Pimentel    He may return on this date: 08/08/2022         If you have any questions or concerns, please don't hesitate to call        Soraya Romano MD    ______________________________           _______________          _______________  Hospital Representative                              Date                                Time AUTUMN Connor

## 2022-08-23 NOTE — PATIENT PROFILE ADULT - NSPRESCRALCFREQ_GEN_A_NUR
family/SO at bedside/resting/sleeping comfortable appearance/family/SO at bedside unable to assess - patient intubated unable to assess - patient intubated/Never

## 2022-11-29 NOTE — ASU PREOP CHECKLIST - STERILIZATION AFFIRMATION
Diagnosis:   Encounter Diagnoses   Name Primary?   • Malignant neoplasm of ascending colon (CMS/HCC) Yes     Regimen: XELIRI + BEVACIZUMAB-XXXX EVERY 21 DAYS    Cycle/Day: 12/1  Is this a C1D1 appt?  No    Dr. Rayo is supervising clinician today.    ECOG:   ECOG [11/29/22 0952]   ECOG Performance Status 1     Review and verified Advanced Directives: Yes; verified forms in EMR    Verified if patient has state DNR bracelet on: No; Full Code    Nursing Assessment:   A focused nursing assessment addressing the toxicity of chemotherapy was performed and the patient reports the following:    Anxiety/Depression/Insomnia: Anxiety: No, Depression: No and Insomnia: No  Pain: NO    Toxicity Assessment    Auditory/Ear  Assessment: Yes (Within Defined Limits)    Cardiac General  Assessment: Yes (Within Defined Limits)    Constitutional  Assessment: Yes (Within Defined Limits)    Dermatology/Skin  Assessment: Yes (Within Defined Limits)    Endocrine  Assessment: Yes (Within Defined Limits)    Gastrointestinal  Assessment: Yes (w/ Exceptions to WDL)  Diarrhea: Grade 1 (improving per patient)  Mucositis Oral: Grade 1    Hemorrhage/Bleeding  Assessment: Yes (Within Defined Limits)    Infection  Assessment: Yes (Within Defined Limits)    Lymphatics  Assessment: Yes (Within Defined Limits)    Musculoskeletal  Assessment: Yes (Within Defined Limits)    Neurology  Assessment: Yes (Within Defined Limits)    Ocular  Assessment: Yes (Within Defined Limits)    Pain  Assessment: Yes (Within Defined Limits)    Pulmonary/Upper Respiratory  Assessment: Yes (w/ Exceptions to WDL)  Cough: Grade 1    Genitourinary  Assessment: Yes (Within Defined Limits)      Patient confirms receipt of a signed copy of Anti-Cancer Treatment consent and verbalizes understanding of treatment plan: Yes.     Pre-Treatment: Treatment consent signed  Patient has valid pre-authorization  VS completed  Height and weight verified  BSA independently double checked &  verified by two practitioners  Premed orders, including hydration, are verified prior to administration  Treatment parameters verified in patient protocol  Chemotherapy doses independently doubled checked & verified by two practitioners    Treatment: I have reviewed the following with the patient:  Name of chemo drug, duration and route of infusion, and reportable infusion-related symptoms.  Chemotherapy has not ; double checked & verified by two practitioners  Appearance and physical integrity of drugs meets standard of drug monograph; double checked & verified by two practitioners  Rate set on infusion pump is in alignment with ordered rate; double checked & verified by two practitioners  Blood return confirmed before, during and after treatment administered  Infusion pump used for non-vesicant drugs  Drugs were administered in proper sequencing  Refer to LDA and MAR for line assessment and medication administration  Patient receiving immunotherapy: Yes - Monoclonal Antibodies    Post Treatment: Treatment tolerated well; no adverse reaction    Transfusion: Not needed    Integrative Medicine: No    Oral Chemotherapy: Yes, Patient is taking medication as prescribed.    Education: No new instructions needed    Next appointment scheduled:   Future Appointments   Date Time Provider Department Center   2022  8:00 AM Iesha Saab, PT [facility]   12/15/2022  8:00 AM Iesha Saab, PT [facility]   2022  9:30 AM [facility]   2022 10:00 AM [facility]4 [facility]OURCE [facility]   2023  9:15 AM MICHELLE Silver [facility]   1/10/2023  9:30 AM [facility]4 ACL LAB [facility]   1/10/2023 10:00 AM Lon Rayo MD [facility]4 [facility]   2023  8:00 AM David Reed MD [facility]   10/9/2023 11:30 AM [facility] NM 02 [id number] [facility]   10/9/2023  1:00 PM [facility] STRESS LAB 02 [id number] [facility]ST [facility]   10/19/2023  1:45 PM [id number]       Patient instructed to call the office with any questions or concerns.    Patient Discharged: patient discharged to home per self, ambulatory.     n/a

## 2023-02-17 NOTE — SWALLOW BEDSIDE ASSESSMENT ADULT - SLP PATIENT PROFILE REVIEW
yes [] : Resident [FreeTextEntry3] : Patient seen and examined in office, plan discussed with family and resident. Note edited extensively accordingly. Discussed importance of continued regular care and will repeat TFT when hem/onc need repeat results. GI concerns needs to be address with GI. F/.u recommended in 6 months. \par

## 2023-08-24 NOTE — PROCEDURE NOTE - ESTIMATED BLOOD LOSS
None Name band; Isotretinoin Pregnancy And Lactation Text: This medication is Pregnancy Category X and is considered extremely dangerous during pregnancy. It is unknown if it is excreted in breast milk.

## 2023-09-21 NOTE — ED ADULT TRIAGE NOTE - NS ED NURSE BANDS TYPE
Name band; Simponi Counseling:  I discussed with the patient the risks of golimumab including but not limited to myelosuppression, immunosuppression, autoimmune hepatitis, demyelinating diseases, lymphoma, and serious infections.  The patient understands that monitoring is required including a PPD at baseline and must alert us or the primary physician if symptoms of infection or other concerning signs are noted.

## 2023-10-18 NOTE — ED PROVIDER NOTE - GENITOURINARY NEGATIVE STATEMENT, MLM
no dysuria, no frequency, and no hematuria. VTAMA Counseling: I discussed with the patient that VTAMA is not for use in the eyes, mouth or mouth. They should call the office if they develop any signs of allergic reactions to VTAMA. The patient verbalized understanding of the proper use and possible adverse effects of VTAMA.  All of the patient's questions and concerns were addressed.

## 2024-08-28 NOTE — ED ADULT NURSE NOTE - NURSING ED EENT NOSE
Duration Of Freeze Thaw-Cycle (Seconds): 5 Post-Care Instructions: I reviewed with the patient in detail post-care instructions. Patient is to wear sunprotection, and avoid picking at any of the treated lesions. Pt may apply Vaseline to crusted or scabbing areas. Render Note In Bullet Format When Appropriate: No Show Applicator Variable?: Yes Consent: The patient's consent was obtained including but not limited to risks of crusting, scabbing, blistering, scarring, darker or lighter pigmentary change, recurrence, incomplete removal and infection. Detail Level: Simple Number Of Freeze-Thaw Cycles: 2 freeze-thaw cycles Duration Of Freeze Thaw-Cycle (Seconds): 5-10 Detail Level: Detailed Medical Necessity Clause: This procedure was medically necessary because the lesions that were treated were: Medical Necessity Information: It is in your best interest to select a reason for this procedure from the list below. All of these items fulfill various CMS LCD requirements except the new and changing color options. Spray Paint Text: The liquid nitrogen was applied to the skin utilizing a spray paint frosting technique. normal

## 2024-12-05 NOTE — PATIENT PROFILE ADULT - FUNCTIONAL SCREEN CURRENT LEVEL: DRESSING, MLM
CALLED PATIENT REGARDING APPT RESCHEDULE. PATIENT TO OBTAIN LAB WORK BEFORE APPT AND STATED HE WOULD GO HAVE IT DONE LATER TODAY. PATIENT WAS RESCHEDULE TO MONDAY 12/9 ARRIVING AT 10:20 FOR A 10:30 APPT TIME. PATIENT WAS AGREEABLE AND STATED UNDERSTANDING    0 = independent

## 2024-12-23 NOTE — ED ADULT NURSE NOTE - NS PRO PASSIVE SMOKE EXP
Reason for Disposition   Patient wants to be seen    Answer Assessment - Initial Assessment Questions  1. ONSET: \"When did the swelling start?\" (e.g., minutes, hours, days)      12/22/24  2. LOCATION: \"What part of the leg is swollen?\"  \"Are both legs swollen or just one leg?\"      Left foot  3. SEVERITY: \"How bad is the swelling?\" (e.g., localized; mild, moderate, severe)    - Localized: Small area of swelling localized to one leg.    - MILD pedal edema: Swelling limited to foot and ankle, pitting edema < 1/4 inch (6 mm) deep, rest and elevation eliminate most or all swelling.    - MODERATE edema: Swelling of lower leg to knee, pitting edema > 1/4 inch (6 mm) deep, rest and elevation only partially reduce swelling.    - SEVERE edema: Swelling extends above knee, facial or hand swelling present.       Mild  4. REDNESS: \"Does the swelling look red or infected?\"      No   5. PAIN: \"Is the swelling painful to touch?\" If Yes, ask: \"How painful is it?\"   (Scale 1-10; mild, moderate or severe)      No  6. FEVER: \"Do you have a fever?\" If Yes, ask: \"What is it, how was it measured, and when did it start?\"       99.8 scanner  7. CAUSE: \"What do you think is causing the leg swelling?\"      Unsure  8. MEDICAL HISTORY: \"Do you have a history of blood clots (e.g., DVT), cancer, heart failure, kidney disease, or liver failure?\"      No   9. RECURRENT SYMPTOM: \"Have you had leg swelling before?\" If Yes, ask: \"When was the last time?\" \"What happened that time?\"      No   10. OTHER SYMPTOMS: \"Do you have any other symptoms?\" (e.g., chest pain, difficulty breathing)        Pneumonia/ shortness of breath / swollen tongue   11. PREGNANCY: \"Is there any chance you are pregnant?\" \"When was your last menstrual period?\"        12/2/24    Protocols used: Leg Swelling and Edema-A-OH     No

## 2025-03-25 NOTE — PROGRESS NOTE ADULT - PROBLEM SELECTOR PLAN 1
[employer] Alert Outreach Telephone Call Attempt     Patient is being outreached by the [facility] for a clinical alert from the [employer] monitoring program.     Call Attempt Date: 3/25/2025    Call Attempt: First Attempt   recurrent asp pna - see Path report - vegetable matter in path -   HOB elev  asp prec  oral hygiene -   MBS noted  education and counseling on swallowing and proper dental and oral hygiene  ct chest reviewed  will update family today  pt needs CIRILO eval as outpatient - has sx and features  will follow

## 2025-06-16 ENCOUNTER — TRANSCRIPTION ENCOUNTER (OUTPATIENT)
Age: 65
End: 2025-06-16